# Patient Record
Sex: FEMALE | Race: WHITE | Employment: OTHER | URBAN - METROPOLITAN AREA
[De-identification: names, ages, dates, MRNs, and addresses within clinical notes are randomized per-mention and may not be internally consistent; named-entity substitution may affect disease eponyms.]

---

## 2019-01-16 PROBLEM — S02.85XA FRACTURE, ORBIT, CLOSED, INITIAL ENCOUNTER (HCC): Status: ACTIVE | Noted: 2019-01-16

## 2019-01-16 PROBLEM — S02.2XXA CLOSED FRACTURE OF NASAL BONES: Status: ACTIVE | Noted: 2019-01-16

## 2019-02-27 PROBLEM — S01.81XA FACIAL LACERATION: Status: ACTIVE | Noted: 2019-02-27

## 2023-03-29 ENCOUNTER — APPOINTMENT (OUTPATIENT)
Dept: GENERAL RADIOLOGY | Age: 58
End: 2023-03-29
Payer: COMMERCIAL

## 2023-03-29 ENCOUNTER — HOSPITAL ENCOUNTER (INPATIENT)
Age: 58
LOS: 3 days | Discharge: HOME HEALTH | End: 2023-04-01
Attending: EMERGENCY MEDICINE | Admitting: INTERNAL MEDICINE
Payer: COMMERCIAL

## 2023-03-29 DIAGNOSIS — L03.818 CELLULITIS OF OTHER SPECIFIED SITE: ICD-10-CM

## 2023-03-29 DIAGNOSIS — L03.319 CELLULITIS OF TRUNK, UNSPECIFIED SITE OF TRUNK: ICD-10-CM

## 2023-03-29 DIAGNOSIS — L30.4 INTERTRIGO: Primary | ICD-10-CM

## 2023-03-29 DIAGNOSIS — A41.9 SEPTICEMIA (HCC): ICD-10-CM

## 2023-03-29 DIAGNOSIS — E87.6 HYPOKALEMIA: ICD-10-CM

## 2023-03-29 PROBLEM — D50.9 IRON DEFICIENCY ANEMIA: Status: ACTIVE | Noted: 2023-03-29

## 2023-03-29 PROBLEM — J45.909 ASTHMA: Status: ACTIVE | Noted: 2023-03-29

## 2023-03-29 PROBLEM — K21.9 GERD (GASTROESOPHAGEAL REFLUX DISEASE): Status: ACTIVE | Noted: 2023-03-29

## 2023-03-29 PROBLEM — L03.90 CELLULITIS: Status: ACTIVE | Noted: 2023-03-29

## 2023-03-29 LAB
ABSOLUTE EOS #: 0.51 K/UL (ref 0–0.44)
ABSOLUTE IMMATURE GRANULOCYTE: 0.03 K/UL (ref 0–0.3)
ABSOLUTE LYMPH #: 1.65 K/UL (ref 1.1–3.7)
ABSOLUTE MONO #: 1.33 K/UL (ref 0.1–1.2)
ALBUMIN SERPL-MCNC: 3.7 G/DL (ref 3.5–5.2)
ALP SERPL-CCNC: 95 U/L (ref 35–104)
ALT SERPL-CCNC: 13 U/L (ref 5–33)
ANION GAP SERPL CALCULATED.3IONS-SCNC: 16 MMOL/L (ref 9–17)
AST SERPL-CCNC: 16 U/L
BASOPHILS # BLD: 1 % (ref 0–2)
BASOPHILS ABSOLUTE: 0.06 K/UL (ref 0–0.2)
BILIRUB DIRECT SERPL-MCNC: 0.1 MG/DL
BILIRUB INDIRECT SERPL-MCNC: 0.4 MG/DL (ref 0–1)
BILIRUB SERPL-MCNC: 0.5 MG/DL (ref 0.3–1.2)
BUN SERPL-MCNC: 8 MG/DL (ref 6–20)
BUN/CREAT BLD: 10 (ref 9–20)
CALCIUM SERPL-MCNC: 9.4 MG/DL (ref 8.6–10.4)
CHLORIDE SERPL-SCNC: 95 MMOL/L (ref 98–107)
CO2 SERPL-SCNC: 26 MMOL/L (ref 20–31)
CREAT SERPL-MCNC: 0.81 MG/DL (ref 0.5–0.9)
EOSINOPHILS RELATIVE PERCENT: 5 % (ref 1–4)
GFR SERPL CREATININE-BSD FRML MDRD: >60 ML/MIN/1.73M2
GLUCOSE SERPL-MCNC: 124 MG/DL (ref 70–99)
HCT VFR BLD AUTO: 45.3 % (ref 36.3–47.1)
HGB BLD-MCNC: 14.7 G/DL (ref 11.9–15.1)
IMMATURE GRANULOCYTES: 0 %
LACTIC ACID, SEPSIS: 1.1 MMOL/L (ref 0.5–1.9)
LACTIC ACID, SEPSIS: 1.4 MMOL/L (ref 0.5–1.9)
LIPASE SERPL-CCNC: 12 U/L (ref 13–60)
LYMPHOCYTES # BLD: 15 % (ref 24–43)
MAGNESIUM SERPL-MCNC: 1.7 MG/DL (ref 1.6–2.6)
MCH RBC QN AUTO: 29.1 PG (ref 25.2–33.5)
MCHC RBC AUTO-ENTMCNC: 32.5 G/DL (ref 28.4–34.8)
MCV RBC AUTO: 89.7 FL (ref 82.6–102.9)
MONOCYTES # BLD: 12 % (ref 3–12)
NRBC AUTOMATED: 0 PER 100 WBC
PDW BLD-RTO: 13.2 % (ref 11.8–14.4)
PLATELET # BLD AUTO: 314 K/UL (ref 138–453)
PMV BLD AUTO: 11.5 FL (ref 8.1–13.5)
POTASSIUM SERPL-SCNC: 2.5 MMOL/L (ref 3.7–5.3)
POTASSIUM SERPL-SCNC: 3.1 MMOL/L (ref 3.7–5.3)
PROT SERPL-MCNC: 6.8 G/DL (ref 6.4–8.3)
RBC # BLD: 5.05 M/UL (ref 3.95–5.11)
SEG NEUTROPHILS: 67 % (ref 36–65)
SEGMENTED NEUTROPHILS ABSOLUTE COUNT: 7.51 K/UL (ref 1.5–8.1)
SODIUM SERPL-SCNC: 137 MMOL/L (ref 135–144)
WBC # BLD AUTO: 11.1 K/UL (ref 3.5–11.3)

## 2023-03-29 PROCEDURE — 83690 ASSAY OF LIPASE: CPT

## 2023-03-29 PROCEDURE — 6360000002 HC RX W HCPCS: Performed by: NURSE PRACTITIONER

## 2023-03-29 PROCEDURE — 83735 ASSAY OF MAGNESIUM: CPT

## 2023-03-29 PROCEDURE — 36415 COLL VENOUS BLD VENIPUNCTURE: CPT

## 2023-03-29 PROCEDURE — 6370000000 HC RX 637 (ALT 250 FOR IP): Performed by: EMERGENCY MEDICINE

## 2023-03-29 PROCEDURE — 96374 THER/PROPH/DIAG INJ IV PUSH: CPT

## 2023-03-29 PROCEDURE — 80076 HEPATIC FUNCTION PANEL: CPT

## 2023-03-29 PROCEDURE — 99223 1ST HOSP IP/OBS HIGH 75: CPT | Performed by: NURSE PRACTITIONER

## 2023-03-29 PROCEDURE — 84132 ASSAY OF SERUM POTASSIUM: CPT

## 2023-03-29 PROCEDURE — 80048 BASIC METABOLIC PNL TOTAL CA: CPT

## 2023-03-29 PROCEDURE — 85025 COMPLETE CBC W/AUTO DIFF WBC: CPT

## 2023-03-29 PROCEDURE — 99285 EMERGENCY DEPT VISIT HI MDM: CPT

## 2023-03-29 PROCEDURE — 2580000003 HC RX 258: Performed by: NURSE PRACTITIONER

## 2023-03-29 PROCEDURE — 87040 BLOOD CULTURE FOR BACTERIA: CPT

## 2023-03-29 PROCEDURE — 71045 X-RAY EXAM CHEST 1 VIEW: CPT

## 2023-03-29 PROCEDURE — 83605 ASSAY OF LACTIC ACID: CPT

## 2023-03-29 PROCEDURE — 1200000000 HC SEMI PRIVATE

## 2023-03-29 PROCEDURE — 2580000003 HC RX 258: Performed by: EMERGENCY MEDICINE

## 2023-03-29 PROCEDURE — 6360000002 HC RX W HCPCS: Performed by: EMERGENCY MEDICINE

## 2023-03-29 PROCEDURE — 6370000000 HC RX 637 (ALT 250 FOR IP): Performed by: NURSE PRACTITIONER

## 2023-03-29 RX ORDER — PROPRANOLOL HYDROCHLORIDE 10 MG/1
10 TABLET ORAL DAILY
Status: DISCONTINUED | OUTPATIENT
Start: 2023-03-29 | End: 2023-03-29

## 2023-03-29 RX ORDER — SODIUM CHLORIDE 9 MG/ML
INJECTION, SOLUTION INTRAVENOUS PRN
Status: DISCONTINUED | OUTPATIENT
Start: 2023-03-29 | End: 2023-03-29 | Stop reason: SDUPTHER

## 2023-03-29 RX ORDER — ONDANSETRON 2 MG/ML
4 INJECTION INTRAMUSCULAR; INTRAVENOUS EVERY 6 HOURS PRN
Status: DISCONTINUED | OUTPATIENT
Start: 2023-03-29 | End: 2023-04-01 | Stop reason: HOSPADM

## 2023-03-29 RX ORDER — FLUTICASONE FUROATE AND VILANTEROL 200; 25 UG/1; UG/1
1 POWDER RESPIRATORY (INHALATION) DAILY
COMMUNITY

## 2023-03-29 RX ORDER — BUPROPION HYDROCHLORIDE 100 MG/1
100 TABLET, EXTENDED RELEASE ORAL 2 TIMES DAILY
Status: DISCONTINUED | OUTPATIENT
Start: 2023-03-29 | End: 2023-04-01 | Stop reason: HOSPADM

## 2023-03-29 RX ORDER — ENOXAPARIN SODIUM 100 MG/ML
40 INJECTION SUBCUTANEOUS DAILY
Status: DISCONTINUED | OUTPATIENT
Start: 2023-03-30 | End: 2023-04-01 | Stop reason: HOSPADM

## 2023-03-29 RX ORDER — SODIUM CHLORIDE 0.9 % (FLUSH) 0.9 %
5-40 SYRINGE (ML) INJECTION EVERY 12 HOURS SCHEDULED
Status: DISCONTINUED | OUTPATIENT
Start: 2023-03-29 | End: 2023-03-29 | Stop reason: SDUPTHER

## 2023-03-29 RX ORDER — FERROUS SULFATE 325(65) MG
325 TABLET ORAL
COMMUNITY

## 2023-03-29 RX ORDER — PANTOPRAZOLE SODIUM 40 MG/1
40 TABLET, DELAYED RELEASE ORAL
Status: DISCONTINUED | OUTPATIENT
Start: 2023-03-30 | End: 2023-04-01 | Stop reason: HOSPADM

## 2023-03-29 RX ORDER — POTASSIUM CHLORIDE 20 MEQ/1
40 TABLET, EXTENDED RELEASE ORAL ONCE
Status: DISCONTINUED | OUTPATIENT
Start: 2023-03-29 | End: 2023-03-29

## 2023-03-29 RX ORDER — SODIUM CHLORIDE 0.9 % (FLUSH) 0.9 %
5-40 SYRINGE (ML) INJECTION EVERY 12 HOURS SCHEDULED
Status: DISCONTINUED | OUTPATIENT
Start: 2023-03-29 | End: 2023-04-01 | Stop reason: HOSPADM

## 2023-03-29 RX ORDER — POTASSIUM CHLORIDE 7.45 MG/ML
10 INJECTION INTRAVENOUS ONCE
Status: DISCONTINUED | OUTPATIENT
Start: 2023-03-29 | End: 2023-04-01 | Stop reason: HOSPADM

## 2023-03-29 RX ORDER — POTASSIUM CHLORIDE 7.45 MG/ML
10 INJECTION INTRAVENOUS PRN
Status: DISCONTINUED | OUTPATIENT
Start: 2023-03-29 | End: 2023-04-01 | Stop reason: HOSPADM

## 2023-03-29 RX ORDER — POTASSIUM CHLORIDE 20 MEQ/1
40 TABLET, EXTENDED RELEASE ORAL PRN
Status: DISCONTINUED | OUTPATIENT
Start: 2023-03-29 | End: 2023-04-01 | Stop reason: HOSPADM

## 2023-03-29 RX ORDER — MAGNESIUM SULFATE 1 G/100ML
1000 INJECTION INTRAVENOUS PRN
Status: DISCONTINUED | OUTPATIENT
Start: 2023-03-29 | End: 2023-04-01 | Stop reason: HOSPADM

## 2023-03-29 RX ORDER — BUDESONIDE AND FORMOTEROL FUMARATE DIHYDRATE 160; 4.5 UG/1; UG/1
2 AEROSOL RESPIRATORY (INHALATION) 2 TIMES DAILY
Status: DISCONTINUED | OUTPATIENT
Start: 2023-03-29 | End: 2023-03-30

## 2023-03-29 RX ORDER — SODIUM CHLORIDE 9 MG/ML
INJECTION, SOLUTION INTRAVENOUS CONTINUOUS
Status: DISCONTINUED | OUTPATIENT
Start: 2023-03-29 | End: 2023-04-01

## 2023-03-29 RX ORDER — LANOLIN ALCOHOL/MO/W.PET/CERES
325 CREAM (GRAM) TOPICAL
Status: DISCONTINUED | OUTPATIENT
Start: 2023-03-30 | End: 2023-04-01 | Stop reason: HOSPADM

## 2023-03-29 RX ORDER — ACETAMINOPHEN 650 MG/1
650 SUPPOSITORY RECTAL EVERY 6 HOURS PRN
Status: DISCONTINUED | OUTPATIENT
Start: 2023-03-29 | End: 2023-04-01 | Stop reason: HOSPADM

## 2023-03-29 RX ORDER — ACETAMINOPHEN 325 MG/1
650 TABLET ORAL EVERY 6 HOURS PRN
Status: DISCONTINUED | OUTPATIENT
Start: 2023-03-29 | End: 2023-04-01 | Stop reason: HOSPADM

## 2023-03-29 RX ORDER — CHOLECALCIFEROL (VITAMIN D3) 1250 MCG
1 CAPSULE ORAL WEEKLY
COMMUNITY

## 2023-03-29 RX ORDER — TRAMADOL HYDROCHLORIDE 50 MG/1
50 TABLET ORAL EVERY 6 HOURS PRN
Status: DISCONTINUED | OUTPATIENT
Start: 2023-03-29 | End: 2023-04-01 | Stop reason: HOSPADM

## 2023-03-29 RX ORDER — SODIUM CHLORIDE 9 MG/ML
INJECTION, SOLUTION INTRAVENOUS PRN
Status: DISCONTINUED | OUTPATIENT
Start: 2023-03-29 | End: 2023-04-01 | Stop reason: HOSPADM

## 2023-03-29 RX ORDER — ONDANSETRON 4 MG/1
4 TABLET, ORALLY DISINTEGRATING ORAL EVERY 8 HOURS PRN
Status: DISCONTINUED | OUTPATIENT
Start: 2023-03-29 | End: 2023-04-01 | Stop reason: HOSPADM

## 2023-03-29 RX ORDER — PROPRANOLOL HYDROCHLORIDE 10 MG/1
10 TABLET ORAL 2 TIMES DAILY
Status: DISCONTINUED | OUTPATIENT
Start: 2023-03-29 | End: 2023-04-01 | Stop reason: HOSPADM

## 2023-03-29 RX ORDER — TRAMADOL HYDROCHLORIDE 50 MG/1
100 TABLET ORAL EVERY 6 HOURS PRN
Status: DISCONTINUED | OUTPATIENT
Start: 2023-03-29 | End: 2023-04-01 | Stop reason: HOSPADM

## 2023-03-29 RX ORDER — SODIUM CHLORIDE 0.9 % (FLUSH) 0.9 %
10 SYRINGE (ML) INJECTION PRN
Status: DISCONTINUED | OUTPATIENT
Start: 2023-03-29 | End: 2023-04-01 | Stop reason: HOSPADM

## 2023-03-29 RX ORDER — FLUCONAZOLE 2 MG/ML
100 INJECTION, SOLUTION INTRAVENOUS EVERY 24 HOURS
Status: DISCONTINUED | OUTPATIENT
Start: 2023-03-29 | End: 2023-04-01

## 2023-03-29 RX ORDER — POLYETHYLENE GLYCOL 3350 17 G/17G
17 POWDER, FOR SOLUTION ORAL DAILY PRN
Status: DISCONTINUED | OUTPATIENT
Start: 2023-03-29 | End: 2023-04-01 | Stop reason: HOSPADM

## 2023-03-29 RX ORDER — 0.9 % SODIUM CHLORIDE 0.9 %
30 INTRAVENOUS SOLUTION INTRAVENOUS ONCE
Status: COMPLETED | OUTPATIENT
Start: 2023-03-29 | End: 2023-03-29

## 2023-03-29 RX ORDER — SODIUM CHLORIDE 0.9 % (FLUSH) 0.9 %
5-40 SYRINGE (ML) INJECTION PRN
Status: DISCONTINUED | OUTPATIENT
Start: 2023-03-29 | End: 2023-03-29 | Stop reason: SDUPTHER

## 2023-03-29 RX ORDER — CLONAZEPAM 0.5 MG/1
1 TABLET ORAL EVERY EVENING
Status: DISCONTINUED | OUTPATIENT
Start: 2023-03-29 | End: 2023-04-01 | Stop reason: HOSPADM

## 2023-03-29 RX ORDER — CETIRIZINE HYDROCHLORIDE 10 MG/1
10 TABLET ORAL DAILY
Status: DISCONTINUED | OUTPATIENT
Start: 2023-03-29 | End: 2023-04-01 | Stop reason: HOSPADM

## 2023-03-29 RX ADMIN — SODIUM CHLORIDE 1000 ML: 9 INJECTION, SOLUTION INTRAVENOUS at 10:20

## 2023-03-29 RX ADMIN — VORTIOXETINE 20 MG: 20 TABLET, FILM COATED ORAL at 18:17

## 2023-03-29 RX ADMIN — POTASSIUM BICARBONATE 40 MEQ: 782 TABLET, EFFERVESCENT ORAL at 17:30

## 2023-03-29 RX ADMIN — CLONAZEPAM 1 MG: 0.5 TABLET ORAL at 18:15

## 2023-03-29 RX ADMIN — PROPRANOLOL HYDROCHLORIDE 10 MG: 10 TABLET ORAL at 18:48

## 2023-03-29 RX ADMIN — TRAMADOL HYDROCHLORIDE 100 MG: 50 TABLET, COATED ORAL at 18:14

## 2023-03-29 RX ADMIN — SODIUM CHLORIDE: 9 INJECTION, SOLUTION INTRAVENOUS at 17:31

## 2023-03-29 RX ADMIN — FLUCONAZOLE 100 MG: 2 INJECTION, SOLUTION INTRAVENOUS at 20:00

## 2023-03-29 RX ADMIN — POTASSIUM BICARBONATE 40 MEQ: 782 TABLET, EFFERVESCENT ORAL at 12:41

## 2023-03-29 RX ADMIN — BUPROPION HYDROCHLORIDE 100 MG: 100 TABLET, EXTENDED RELEASE ORAL at 20:01

## 2023-03-29 RX ADMIN — VANCOMYCIN HYDROCHLORIDE 2000 MG: 1 INJECTION, POWDER, LYOPHILIZED, FOR SOLUTION INTRAVENOUS at 10:58

## 2023-03-29 ASSESSMENT — PAIN DESCRIPTION - LOCATION
LOCATION: BREAST
LOCATION: ABDOMEN;BREAST

## 2023-03-29 ASSESSMENT — PAIN SCALES - GENERAL
PAINLEVEL_OUTOF10: 5
PAINLEVEL_OUTOF10: 7
PAINLEVEL_OUTOF10: 9

## 2023-03-29 ASSESSMENT — PAIN DESCRIPTION - ORIENTATION: ORIENTATION: ANTERIOR;LEFT;RIGHT

## 2023-03-29 ASSESSMENT — ENCOUNTER SYMPTOMS
NAUSEA: 0
SORE THROAT: 0
RHINORRHEA: 0
COUGH: 0
SHORTNESS OF BREATH: 0
DIARRHEA: 0
EYE REDNESS: 0
VOMITING: 0
COLOR CHANGE: 0
EYE DISCHARGE: 0

## 2023-03-29 ASSESSMENT — PAIN - FUNCTIONAL ASSESSMENT
PAIN_FUNCTIONAL_ASSESSMENT: 0-10
PAIN_FUNCTIONAL_ASSESSMENT: PREVENTS OR INTERFERES SOME ACTIVE ACTIVITIES AND ADLS

## 2023-03-29 ASSESSMENT — PAIN DESCRIPTION - PAIN TYPE: TYPE: ACUTE PAIN

## 2023-03-29 ASSESSMENT — PAIN DESCRIPTION - DESCRIPTORS: DESCRIPTORS: BURNING

## 2023-03-29 ASSESSMENT — PAIN DESCRIPTION - FREQUENCY: FREQUENCY: CONTINUOUS

## 2023-03-29 NOTE — PROGRESS NOTES
Med list fax obtained. Med list updated according to list. Med rec pharmacist consulted for assistance d/t discrepancies.

## 2023-03-29 NOTE — H&P
[facility]  Office: [address], DO, Tiffany Bray, DO, Sindhu Haque, DO, Jahaira gary Blood, DO, Marisa Rodriguez MD, Gretchen Joseph MD, Gisselle Sen MD, Carlo Matta MD,  Ede Santizo MD, Adriana Andres MD, Manda Villanueva, DO, Adithya Carroll MD,  Linda Marie MD, Aaron Acuña MD, Humza Hollis DO, Benedict Reynolds MD, Layla Birmingham MD, Femi Simms DO, Ravindra Shea MD, Meri Tellez MD, Jenna Lopes MD, Colton Mcintyre MD,  Rustam Limon DO, Andree Presley MD,  Torie Saldana, CNP,  Cory Fox, CNP, Elizabet Hernandez, CNP, Tracey Bryan, CNP,  Elsi Harley, DNP, oTmmy Rachel, CNP, Jina Rob, CNP, Mejia Cedillo, CNP, Ivan Potter, CNP, Nathanael Barragan, CNP, Rain Meredith PA-C, Nicko Alvarez, CNS, Zhanna Mann, CNP, Judit Aden, CNP         [facility]    HISTORY AND PHYSICAL EXAMINATION            Date:   3/29/2023  Patient name:  Rudy Borges  Date of admission:  3/29/2023  8:48 AM  MRN:   [id number]  Account:  [de-identified]  YOB: 1965  PCP:    No primary care provider on file. Room:   [address] [place] [address]  Code Status:    Full Code    Chief Complaint:     Chief Complaint   Patient presents with    Cellulitis       History Obtained From:     patient, electronic medical record    History of Present Illness:     Rudy Borges is a 62 y.o. Non- / non  female history of schizoaffective bipolar disorder, asthma and GERD who presents with Cellulitis and is admitted to the hospital for the management of Cellulitis. Patient states that she has had some bilateral breast irritation and a rash for the last few weeks. She states she is generally not been feeling well and complains of ongoing weakness and low-grade fevers. She states it is quite painful under both of her breasts and she has not been able to sleep because of the pain.   Patient lives with her father. No leukocytosis on arrival, potassium was 2.5 and she did receive 40 mEq of oral potassium in ED. patient was mildly tachycardic, and did have some increased respirations however she is in no acute distress. Lactic acid 1.1, 1.4. He is afebrile here. Patient was given IV vancomycin for concerns of cellulitis in ED  Past Medical History:     Past Medical History:   Diagnosis Date    Asthma     GERD (gastroesophageal reflux disease)     Iron deficiency anemia     Schizoaffective disorder, bipolar type ([facility] [address]         Past Surgical History:     Past Surgical History:   Procedure Laterality Date    [place] HEMIARTHROPLASTY HIP PARTIAL      Hip Replacement, Partial, left        Medications Prior to Admission:     Prior to Admission medications    Medication Sig Start Date End Date Taking?  Authorizing Provider   fluticasone furoate-vilanterol (BREO ELLIPTA) 200-25 MCG/ACT AEPB inhaler Inhale 1 puff into the lungs daily   Yes Historical Provider, MD   ferrous sulfate (IRON 325) 325 (65 Fe) MG tablet Take 325 mg by mouth daily (with breakfast)   Yes Historical Provider, MD   Cholecalciferol (VITAMIN D3) 1.25 MG (23630 UT) CAPS Take 1 each by mouth once a week Indications: Fridays   Yes Historical Provider, MD   cariprazine hcl (VRAYLAR) 1.5 MG capsule Take 1.5 mg by mouth daily   Yes Historical Provider, MD   VORTIoxetine HBr (TRINTELLIX) 20 MG TABS tablet Take 20 mg by mouth daily   Yes Historical Provider, MD   fexofenadine (ALLEGRA) 180 MG tablet Take 1 tablet by mouth daily as needed 2/6/19   Historical Provider, MD   omeprazole (PRILOSEC) 40 MG delayed release capsule Take 40 mg by mouth daily 8/23/18   Historical Provider, MD   propranolol (INDERAL) 10 MG tablet Take 10 mg by mouth 2 times daily    Historical Provider, MD   buPROPion (WELLBUTRIN SR) 100 MG extended release tablet Take 1 tablet by mouth 2 times daily 10/5/16   Historical Provider, MD   clonazePAM (KLONOPIN) 1 MG tablet Take 1 tablet by mouth

## 2023-03-29 NOTE — CONSULTS
[address] [facility] Pharmacokinetic Monitoring Service - Vancomycin     Kehinde Lan is a 62 y.o. female starting on vancomycin therapy for SSTI. Pharmacy consulted by Baron Cai for monitoring and adjustment. Target Concentration: Goal trough of 10-15 mg/L and AUC/TYLER <500 mg*hr/L    Additional Antimicrobials: None    Pertinent Laboratory Values: Wt Readings from Last 1 Encounters:   03/29/23 181 lb (82.1 kg)     Temp Readings from Last 1 Encounters:   03/29/23 98.1 °F (36.7 °C) (Oral)     Estimated Creatinine Clearance: 78 mL/min (based on SCr of 0.81 mg/dL). Recent Labs     03/29/23  0900   CREATININE 0.81   WBC 11.1     Procalcitonin: None    MRSA Nasal Swab: N/A. Non-respiratory infection.     Plan:  Dosing recommendations based on Bayesian software  Start vancomycin 2000mg bolus dose followed by 1250mg q18h  Anticipated AUC of 414 and trough concentration of 11.8 at steady state  Renal labs as indicated   Vancomycin concentration ordered for 3/30 @ 1200   Pharmacy will continue to monitor patient and adjust therapy as indicated    Thank you for the consult,  Georgia Nuñez [facility]  3/29/2023 5:08 PM

## 2023-03-29 NOTE — PROGRESS NOTES
Transitions of Care Pharmacy Service   Medication Review    The patient's list of current home medications has been reviewed. Her oral medications are bubblepacked by [employer]. Source(s) of information: patient, [employer], [employer] refill report      Please feel free to call me with any questions about this encounter. Thank you. [facility]   Transitions of Care Pharmacy Service  Phone:  [phone]  Fax: [phone]      Electronically signed by Luisito [facility] on 3/29/2023 at 6:46 PM           Medications Prior to Admission:   fluticasone furoate-vilanterol (BREO ELLIPTA) 200-25 MCG/ACT AEPB inhaler, Inhale 1 puff into the lungs daily  ferrous sulfate (IRON 325) 325 (65 Fe) MG tablet, Take 325 mg by mouth daily (with breakfast)  Cholecalciferol (VITAMIN D3) 1.25 MG (82230 UT) CAPS, Take 1 each by mouth once a week Indications: Fridays  cariprazine hcl (VRAYLAR) 1.5 MG capsule, Take 1.5 mg by mouth daily  VORTIoxetine HBr (TRINTELLIX) 20 MG TABS tablet, Take 20 mg by mouth daily  fexofenadine (ALLEGRA) 180 MG tablet, Take 1 tablet by mouth daily as needed  omeprazole (PRILOSEC) 40 MG delayed release capsule, Take 40 mg by mouth daily  propranolol (INDERAL) 10 MG tablet, Take 10 mg by mouth 2 times daily  buPROPion (WELLBUTRIN SR) 100 MG extended release tablet, Take 1 tablet by mouth 2 times daily  clonazePAM (KLONOPIN) 1 MG tablet, Take 1 tablet by mouth every evening.

## 2023-03-29 NOTE — ED PROVIDER NOTES
EMERGENCY DEPARTMENT ENCOUNTER    Pt Name: Jd Keys  MRN: [id number]  [place] 1965  Date of evaluation: 3/29/23  CHIEF COMPLAINT       Chief Complaint   Patient presents with    Cellulitis     HISTORY OF PRESENT ILLNESS   This is a 80-year-old female who presents with complaints of bilateral breast irritation, weakness and just generally not feeling well. The patient states that her symptoms been ongoing for the past few weeks with much worsening symptoms over the past few days. She is to the point where she not able to walk around because the pain is so severe, she had a low-grade fever and was brought in for further evaluation. The patient has gotten to the point where she is so weak she is not able to get up and walk around on her own. REVIEW OF SYSTEMS     Review of Systems   Constitutional:  Positive for fatigue. Negative for chills and fever. HENT:  Negative for rhinorrhea and sore throat. Eyes:  Negative for discharge, redness and visual disturbance. Respiratory:  Negative for cough and shortness of breath. Cardiovascular:  Negative for chest pain, palpitations and leg swelling. Gastrointestinal:  Negative for diarrhea, nausea and vomiting. Genitourinary:  Negative for dysuria and hematuria. Musculoskeletal:  Negative for arthralgias, myalgias and neck pain. Skin:  Positive for rash. Negative for color change. Neurological:  Positive for weakness. Negative for seizures and headaches. Psychiatric/Behavioral:  Negative for hallucinations, self-injury and suicidal ideas. PASTMEDICAL HISTORY   History reviewed. No pertinent past medical history. Past Problem List  Patient Active Problem List   Diagnosis Code    Schizoaffective disorder, bipolar type ([facility] F25.0    Closed fracture of nasal bones S02. 2XXA    Fracture, orbit, closed, initial encounter ([facility] [address] S02.85XA    Facial laceration S01.81XA     SURGICAL HISTORY       Past Surgical History:   Procedure Laterality Date    [place] HEMIARTHROPLASTY HIP PARTIAL      Hip Replacement, Partial, left     CURRENT MEDICATIONS       Previous Medications    BENZTROPINE (COGENTIN) 0.5 MG TABLET    Take 1 tablet by mouth 2 times daily    BUPROPION (WELLBUTRIN SR) 100 MG EXTENDED RELEASE TABLET    Take 1 tablet by mouth daily    BUSPIRONE (BUSPAR) 10 MG TABLET    Take 1 tablet by mouth 3 times daily    CARIPRAZINE HCL 3 MG CAPS    Take 3 mg by mouth daily    CLONAZEPAM (KLONOPIN) 1 MG TABLET    Take 1 tablet by mouth daily. Justa Estrada ESCITALOPRAM (LEXAPRO) 5 MG TABLET    Take 5 mg by mouth daily    FEXOFENADINE (ALLEGRA) 180 MG TABLET    Take 1 tablet by mouth daily    FLUTICASONE (FLONASE) 50 MCG/ACT NASAL SPRAY    2 sprays by Nasal route daily    HYDROXYZINE (ATARAX) 25 MG TABLET    Take 2 tablets by mouth daily    LORAZEPAM (ATIVAN) 1 MG TABLET    Take 1 tablet by mouth daily    OMEPRAZOLE (PRILOSEC) 20 MG DELAYED RELEASE CAPSULE    Take 20 mg by mouth daily    PROPRANOLOL (INDERAL) 10 MG TABLET    Take 10 mg by mouth daily    RANITIDINE (ZANTAC) 150 MG TABLET    Take 150 mg by mouth daily    TOPIRAMATE (TOPAMAX) 50 MG TABLET    Take 50 mg by mouth daily    TRAZODONE (DESYREL) 100 MG TABLET    Take 1 tablet by mouth nightly as needed for Sleep    VORTIOXETINE (BRINTELLIX) 10 MG TABS TABLET    Take 10 mg by mouth daily     ALLERGIES     is allergic to penicillins, animal chews, dust mite extract, fish-derived products, and molds & smuts. FAMILY HISTORY     has no family status information on file. SOCIAL HISTORY       Social History     Tobacco Use    Smoking status: Never    Smokeless tobacco: Never   Substance Use Topics    Alcohol use: Not Currently    Drug use: Never     PHYSICAL EXAM     INITIAL VITALS: /89   Pulse 93   Temp 97.9 °F (36.6 °C) (Oral)   Resp 22   Ht 5' 3\" (1.6 m)   Wt 181 lb (82.1 kg)   LMP 05/03/2016   SpO2 94%   BMI 32.06 kg/m²    Physical Exam  Constitutional:       Appearance: Normal appearance.  She is

## 2023-03-29 NOTE — PROGRESS NOTES
Pt admitted to room 2109 per wheelchair in good condition from ED  Oriented to room and surroundings  Bed in lowest position, wheels locked, 2/4 side rails up  Call light in reach, room free of clutter, adequate lighting provided  Denies any further questions at this time  Instructed to call out with any questions/concerns/new onset of pain and/or n/v   White board updated  Continue to monitor with hourly rounding  STAY WITH ME protocol initiated   Bed alarm on/Fall Risk signs in place/Fall risk sticker to wrist band  Non-skid socks on/at bedside

## 2023-03-29 NOTE — PROGRESS NOTES
Attempted to call patients father. Patient states father has her living will. Number rang busy. [phone]: attempted to call father again, still rang busy.

## 2023-03-30 PROBLEM — E87.6 HYPOKALEMIA: Status: ACTIVE | Noted: 2023-03-29

## 2023-03-30 LAB
ABSOLUTE EOS #: 0.59 K/UL (ref 0–0.44)
ABSOLUTE IMMATURE GRANULOCYTE: 0.03 K/UL (ref 0–0.3)
ABSOLUTE LYMPH #: 1.82 K/UL (ref 1.1–3.7)
ABSOLUTE MONO #: 1 K/UL (ref 0.1–1.2)
ANION GAP SERPL CALCULATED.3IONS-SCNC: 11 MMOL/L (ref 9–17)
BASOPHILS # BLD: 1 % (ref 0–2)
BASOPHILS ABSOLUTE: 0.04 K/UL (ref 0–0.2)
BUN SERPL-MCNC: 4 MG/DL (ref 6–20)
BUN/CREAT BLD: 6 (ref 9–20)
CALCIUM SERPL-MCNC: 8.6 MG/DL (ref 8.6–10.4)
CHLORIDE SERPL-SCNC: 103 MMOL/L (ref 98–107)
CO2 SERPL-SCNC: 25 MMOL/L (ref 20–31)
CREAT SERPL-MCNC: 0.66 MG/DL (ref 0.5–0.9)
EOSINOPHILS RELATIVE PERCENT: 7 % (ref 1–4)
GFR SERPL CREATININE-BSD FRML MDRD: >60 ML/MIN/1.73M2
GLUCOSE SERPL-MCNC: 95 MG/DL (ref 70–99)
HCT VFR BLD AUTO: 40 % (ref 36.3–47.1)
HGB BLD-MCNC: 13 G/DL (ref 11.9–15.1)
IMMATURE GRANULOCYTES: 0 %
LYMPHOCYTES # BLD: 21 % (ref 24–43)
MAGNESIUM SERPL-MCNC: 1.6 MG/DL (ref 1.6–2.6)
MCH RBC QN AUTO: 29.4 PG (ref 25.2–33.5)
MCHC RBC AUTO-ENTMCNC: 32.5 G/DL (ref 28.4–34.8)
MCV RBC AUTO: 90.5 FL (ref 82.6–102.9)
MONOCYTES # BLD: 12 % (ref 3–12)
NRBC AUTOMATED: 0 PER 100 WBC
PDW BLD-RTO: 13.2 % (ref 11.8–14.4)
PLATELET # BLD AUTO: 269 K/UL (ref 138–453)
PMV BLD AUTO: 10.4 FL (ref 8.1–13.5)
POTASSIUM SERPL-SCNC: 2.9 MMOL/L (ref 3.7–5.3)
POTASSIUM SERPL-SCNC: 3.6 MMOL/L (ref 3.7–5.3)
RBC # BLD: 4.42 M/UL (ref 3.95–5.11)
SEG NEUTROPHILS: 59 % (ref 36–65)
SEGMENTED NEUTROPHILS ABSOLUTE COUNT: 5.01 K/UL (ref 1.5–8.1)
SODIUM SERPL-SCNC: 139 MMOL/L (ref 135–144)
VANCOMYCIN RANDOM: 16.7 UG/ML
WBC # BLD AUTO: 8.5 K/UL (ref 3.5–11.3)

## 2023-03-30 PROCEDURE — 85025 COMPLETE CBC W/AUTO DIFF WBC: CPT

## 2023-03-30 PROCEDURE — 83735 ASSAY OF MAGNESIUM: CPT

## 2023-03-30 PROCEDURE — 6360000002 HC RX W HCPCS: Performed by: NURSE PRACTITIONER

## 2023-03-30 PROCEDURE — 84132 ASSAY OF SERUM POTASSIUM: CPT

## 2023-03-30 PROCEDURE — 2580000003 HC RX 258: Performed by: NURSE PRACTITIONER

## 2023-03-30 PROCEDURE — 99232 SBSQ HOSP IP/OBS MODERATE 35: CPT | Performed by: NURSE PRACTITIONER

## 2023-03-30 PROCEDURE — 36415 COLL VENOUS BLD VENIPUNCTURE: CPT

## 2023-03-30 PROCEDURE — 1200000000 HC SEMI PRIVATE

## 2023-03-30 PROCEDURE — 80202 ASSAY OF VANCOMYCIN: CPT

## 2023-03-30 PROCEDURE — 80048 BASIC METABOLIC PNL TOTAL CA: CPT

## 2023-03-30 PROCEDURE — 6370000000 HC RX 637 (ALT 250 FOR IP): Performed by: NURSE PRACTITIONER

## 2023-03-30 RX ORDER — POTASSIUM CHLORIDE 20 MEQ/1
40 TABLET, EXTENDED RELEASE ORAL ONCE
Status: COMPLETED | OUTPATIENT
Start: 2023-03-30 | End: 2023-03-30

## 2023-03-30 RX ADMIN — ENOXAPARIN SODIUM 40 MG: 100 INJECTION SUBCUTANEOUS at 09:48

## 2023-03-30 RX ADMIN — SODIUM CHLORIDE: 9 INJECTION, SOLUTION INTRAVENOUS at 20:58

## 2023-03-30 RX ADMIN — CARIPRAZINE 1.5 MG: 1.5 CAPSULE, GELATIN COATED ORAL at 09:48

## 2023-03-30 RX ADMIN — CLONAZEPAM 1 MG: 0.5 TABLET ORAL at 18:40

## 2023-03-30 RX ADMIN — CETIRIZINE HYDROCHLORIDE 10 MG: 10 TABLET, FILM COATED ORAL at 09:48

## 2023-03-30 RX ADMIN — FERROUS SULFATE TAB EC 325 MG (65 MG FE EQUIVALENT) 325 MG: 325 (65 FE) TABLET DELAYED RESPONSE at 09:53

## 2023-03-30 RX ADMIN — VORTIOXETINE 20 MG: 20 TABLET, FILM COATED ORAL at 09:48

## 2023-03-30 RX ADMIN — VANCOMYCIN HYDROCHLORIDE 1250 MG: 5 INJECTION, POWDER, LYOPHILIZED, FOR SOLUTION INTRAVENOUS at 05:18

## 2023-03-30 RX ADMIN — POTASSIUM CHLORIDE 10 MEQ: 7.46 INJECTION, SOLUTION INTRAVENOUS at 10:58

## 2023-03-30 RX ADMIN — POTASSIUM CHLORIDE 10 MEQ: 7.46 INJECTION, SOLUTION INTRAVENOUS at 09:59

## 2023-03-30 RX ADMIN — BUPROPION HYDROCHLORIDE 100 MG: 100 TABLET, EXTENDED RELEASE ORAL at 20:56

## 2023-03-30 RX ADMIN — BUPROPION HYDROCHLORIDE 100 MG: 100 TABLET, EXTENDED RELEASE ORAL at 09:48

## 2023-03-30 RX ADMIN — PROPRANOLOL HYDROCHLORIDE 10 MG: 10 TABLET ORAL at 09:48

## 2023-03-30 RX ADMIN — VANCOMYCIN HYDROCHLORIDE 1500 MG: 5 INJECTION, POWDER, LYOPHILIZED, FOR SOLUTION INTRAVENOUS at 23:27

## 2023-03-30 RX ADMIN — FLUCONAZOLE 100 MG: 2 INJECTION, SOLUTION INTRAVENOUS at 18:41

## 2023-03-30 RX ADMIN — POTASSIUM CHLORIDE 40 MEQ: 1500 TABLET, EXTENDED RELEASE ORAL at 12:52

## 2023-03-30 RX ADMIN — PROPRANOLOL HYDROCHLORIDE 10 MG: 10 TABLET ORAL at 20:57

## 2023-03-30 RX ADMIN — PANTOPRAZOLE SODIUM 40 MG: 40 TABLET, DELAYED RELEASE ORAL at 05:23

## 2023-03-30 ASSESSMENT — PAIN DESCRIPTION - ORIENTATION: ORIENTATION: LOWER

## 2023-03-30 ASSESSMENT — PAIN DESCRIPTION - LOCATION: LOCATION: BACK

## 2023-03-30 ASSESSMENT — PAIN SCALES - GENERAL: PAINLEVEL_OUTOF10: 3

## 2023-03-30 ASSESSMENT — PAIN DESCRIPTION - DESCRIPTORS: DESCRIPTORS: ACHING

## 2023-03-30 NOTE — PROGRESS NOTES
[facility]  Office: [address], DO, Erasmo Moraga, DO, Antonio Chávez, DO, Juan Larson Blood, DO, Moisés Reese MD, Livan Sanchez MD, Shaheen Alexander MD, Gladys Douglas MD,  Piedad Quijano MD, Huma Chappell MD, Gaby Vaughan, DO, Roman San MD,  Toribio Grijalva MD, Wily Brunner MD, Silvina Freitas, DO, Mono Padilla MD, Dedrick Camilo MD, Helene Snider, DO, Tj Abbasi MD, Reji Montoya MD, Gilman Essex, MD, Claudetta Rad, MD,  Howard Sandra, DO, Paulette Andrews MD,  Aly Ashford CNP,  Doc Meza CNP, Ghada Garcia CNP, Alicja Trevino, CNP,  Charmel Osgood, SHOBHA, Gely Weiss, MARGARET, Sharmila Murdock CNP, Dwaine Cameron CNP, Shaheen Orr CNP, Aleksander Medrano CNP, Valeri Mckeon PA-C, Kiana Fam, MILDRED, Omid Rao, MARGARET, Jammie Crum, Calderon [facility]    Progress Note    3/30/2023    9:56 AM     Name:   Abdelrahman Capone  MRN:     [id number]     Acct:      [de-identified]   Room:   2109/2109-01   Day:  1  Admit Date:  3/29/2023  8:48 AM    PCP:   No primary care provider on file. Code Status:  Full Code    Subjective:     C/C:   Chief Complaint   Patient presents with    Cellulitis     Interval History Status: improved. Patient is sitting up in chair, she states she is feeling a little better today compared to yesterday. He still gets mildly tachycardic occasionally however vital signs are stable. Breath. She does get easily tearful/anxious, support provided and questions answered    Brief History:   Abdelrahman Capone is a 62 y.o. female history of schizoaffective bipolar disorder, asthma and GERD who presents with Cellulitis and is admitted to the hospital for the management of Cellulitis. Patient states that she has had some bilateral breast irritation and a rash for the last few weeks.   She states she is generally not been feeling well and complains of ongoing weakness and chloride, potassium chloride **OR** potassium alternative oral replacement **OR** potassium chloride, magnesium sulfate, ondansetron **OR** ondansetron, polyethylene glycol, acetaminophen **OR** acetaminophen, traMADol **OR** traMADol    Data:     Past Medical History:   has a past medical history of Asthma, GERD (gastroesophageal reflux disease), Iron deficiency anemia, and Schizoaffective disorder, bipolar type ([address] Social History:   reports that she has never smoked. She has never used smokeless tobacco. She reports that she does not currently use alcohol. She reports that she does not use drugs. Family History:   Family History   Problem Relation Age of Onset    Heart Disease Mother     Cancer Father        Vitals:  BP (!) 157/91   Pulse 80   Temp 98.4 °F (36.9 °C) (Oral)   Resp 18   Ht 5' 3\" (1.6 m)   Wt 185 lb (83.9 kg)   LMP 2016   SpO2 95%   BMI 32.77 kg/m²   Temp (24hrs), Av.2 °F (36.8 °C), Min:97.9 °F (36.6 °C), Max:98.4 °F (36.9 °C)    No results for input(s): POCGLU in the last 72 hours. I/O (24Hr):     Intake/Output Summary (Last 24 hours) at 3/30/2023 1009  Last data filed at 3/30/2023 0552  Gross per 24 hour   Intake --   Output 1500 ml   Net -1500 ml       Labs:  Hematology:  Recent Labs     23  0900 23  0654   WBC 11.1 8.5   RBC 5.05 4.42   HGB 14.7 13.0   HCT 45.3 40.0   MCV 89.7 90.5   MCH 29.1 29.4   MCHC 32.5 32.5   RDW 13.2 13.2    269   MPV 11.5 10.4     Chemistry:  Recent Labs     23  0900 23  1654 23  0654     --  139   K 2.5* 3.1* 2.9*   CL 95*  --  103   CO2 26  --  25   GLUCOSE 124*  --  95   BUN 8  --  4*   CREATININE 0.81  --  0.66   MG 1.7  --  1.6   ANIONGAP 16  --  11   LABGLOM >60  --  >60   CALCIUM 9.4  --  8.6     Recent Labs     23  0900   PROT 6.8   LABALBU 3.7   AST 16   ALT 13   ALKPHOS 95   BILITOT 0.5   BILIDIR 0.1   LIPASE 12*     ABG:No results found for: POCPH, PHART, PH, POCPCO2, SYK4IVB, PCO2,

## 2023-03-30 NOTE — PROGRESS NOTES
Weight Change (Calculated): -5.1                    BMI Categories: Obese Class 1 (BMI 30.0-34. 9)    Estimated Daily Nutrient Needs:  Energy Requirements Based On: Kcal/kg  Weight Used for Energy Requirements: Current  Energy (kcal/day): 1744-8477 kcal (15-18 kcal/kg)  Weight Used for Protein Requirements: Ideal  Protein (g/day): 62-68 gm of protein (1.2-1.3 gm/kg)       Nutrition Diagnosis:   Moderate malnutrition related to inadequate protein-energy intake as evidenced by weight loss, intake 26-50%    Nutrition Interventions:   Food and/or Nutrient Delivery: Continue Current Diet, Modify Oral Nutrition Supplement  Nutrition Education/Counseling: Education not indicated  Coordination of Nutrition Care: Continue to monitor while inpatient       Goals:     Goals: PO intake 75% or greater       Nutrition Monitoring and Evaluation:      Food/Nutrient Intake Outcomes: Food and Nutrient Intake, Supplement Intake  Physical Signs/Symptoms Outcomes: Skin, Fluid Status or Edema, Biochemical Data    Discharge Planning:    Continue current diet         Osorio RODRIGUEZN, RDN, LDN  Lead Clinical Dietitian  RD Office Phone [phone]

## 2023-03-30 NOTE — PROGRESS NOTES
[address] [facility] Pharmacokinetic Monitoring Service - Vancomycin    Consulting Provider: KAITLIN Glaser CNP   Indication: SSTI  Target Concentration: Goal trough of 10-15 mg/L and AUC/TYLER <500 mg*hr/L  Day of Therapy: 2  Additional Antimicrobials: fluconazole     Pertinent Laboratory Values: Wt Readings from Last 1 Encounters:   03/30/23 185 lb (83.9 kg)     Temp Readings from Last 1 Encounters:   03/30/23 98.4 °F (36.9 °C) (Oral)     Estimated Creatinine Clearance: 97 mL/min (based on SCr of 0.66 mg/dL). Recent Labs     03/29/23  0900 03/30/23  0654   CREATININE 0.81 0.66   WBC 11.1 8.5     Procalcitonin: none     Pertinent Cultures:  Culture Date Source Results   3/29/23 blood No growth    MRSA Nasal Swab: N/A. Non-respiratory infection. Recent vancomycin administrations                     vancomycin (VANCOCIN) 1,250 mg in sodium chloride 0.9 % 250 mL IVPB (mg) 1,250 mg New Bag 03/30/23 0518    vancomycin (VANCOCIN) 2,000 mg in sodium chloride 0.9 % 500 mL IVPB (mg) 2,000 mg New Bag 03/29/23 1058                    Assessment:  Date/Time Current Dose Concentration Timing of Concentration (h) AUC   3/30/23 1250mg q18h  16.7 9h24m 402   Note: Serum concentrations collected for AUC dosing may appear elevated if collected in close proximity to the dose administered, this is not necessarily an indication of toxicity    Plan:  Current dosing regimen is therapeutic.  But low end of AUC  Increase dose to vancomycin 1500mg ivpb q18h   to achieve AUC = 480 and trough = 13.2    Pharmacy will continue to monitor patient and adjust therapy as indicated    Thank you for the consult,  Dc Gallardo, [address] [facility]  3/30/2023 4:27 PM

## 2023-03-30 NOTE — CARE COORDINATION
Case Management Initial Discharge Plan  Josephine Rincon,             Met with:patient to discuss discharge plans. Information verified: address, contacts, phone number, , insurance Yes  PCP: NORM Barba CNP  Date of last visit: 3-    Insurance Provider: Mekhi Vallejo Dual    Discharge Planning    Living Arrangements:  Parent   Support Systems:  Parent    Home has 2 stories  2 stairs to climb to get into front door, 15 stairs to climb to reach second floor  Location of bedroom/bathroom in home  - second floor (has been sleeping on couch)    Patient able to perform ADL's:Independent    Current Services (outpatient & in home) none  DME equipment: none  DME provider: N/A    Pharmacy: [facility]    Potential Assistance Needed:  Home Care    Patient agreeable to home care: TBD  [place] of choice provided:  no    Prior SNF/Rehab Placement and Facility: No  Agreeable to SNF/Rehab: No  [place] of choice provided: n/a   Evaluation: n/a    Expected Discharge date:     Patient expects to be discharged to:   home  Follow Up Appointment: Best Day/ Time:      Transportation provider: father  Transportation arrangements needed for discharge: No    Readmission Risk              Risk of Unplanned Readmission:  13             Does patient have a readmission risk score greater than 14?: No  If yes, follow-up appointment must be made within 7 days of discharge. Goal of Care:       Discharge Plan: Met with patient at bedside. Lives with father and states is independent and drives. Admitted for inflammation under both breasts that has progressively been worsening. Currently on IV Vanco and Diflucan. Has history of schizoaffective disorder bipolar type. Pt becomes tearful at times during conversation. Stated her mom passed away 2 years ago and now lives with dad. Continue to follow for home care needs once plan of care is determined.                   Electronically signed by Bessy Cheng Yeimy Dugan on 3/30/23 at 3:19 PM EDT

## 2023-03-30 NOTE — PLAN OF CARE
Problem: Discharge Planning  Goal: Discharge to home or other facility with appropriate resources  Outcome: Progressing  Note: Patient will go home following this admit. Problem: Pain  Goal: Verbalizes/displays adequate comfort level or baseline comfort level  Outcome: Progressing  Note: Patient has as needed pain control and will ask for it.

## 2023-03-31 PROBLEM — L30.4 INTERTRIGO: Status: ACTIVE | Noted: 2023-03-31

## 2023-03-31 LAB
ABSOLUTE EOS #: 0.4 K/UL (ref 0–0.44)
ABSOLUTE IMMATURE GRANULOCYTE: 0.02 K/UL (ref 0–0.3)
ABSOLUTE LYMPH #: 1.49 K/UL (ref 1.1–3.7)
ABSOLUTE MONO #: 0.8 K/UL (ref 0.1–1.2)
ANION GAP SERPL CALCULATED.3IONS-SCNC: 8 MMOL/L (ref 9–17)
BASOPHILS # BLD: 1 % (ref 0–2)
BASOPHILS ABSOLUTE: 0.04 K/UL (ref 0–0.2)
BUN SERPL-MCNC: 7 MG/DL (ref 6–20)
BUN/CREAT BLD: 9 (ref 9–20)
CALCIUM SERPL-MCNC: 8.7 MG/DL (ref 8.6–10.4)
CHLORIDE SERPL-SCNC: 110 MMOL/L (ref 98–107)
CO2 SERPL-SCNC: 28 MMOL/L (ref 20–31)
CREAT SERPL-MCNC: 0.74 MG/DL (ref 0.5–0.9)
EOSINOPHILS RELATIVE PERCENT: 6 % (ref 1–4)
GFR SERPL CREATININE-BSD FRML MDRD: >60 ML/MIN/1.73M2
GLUCOSE SERPL-MCNC: 107 MG/DL (ref 70–99)
HCT VFR BLD AUTO: 39.7 % (ref 36.3–47.1)
HGB BLD-MCNC: 12.4 G/DL (ref 11.9–15.1)
IMMATURE GRANULOCYTES: 0 %
LYMPHOCYTES # BLD: 23 % (ref 24–43)
MAGNESIUM SERPL-MCNC: 1.7 MG/DL (ref 1.6–2.6)
MCH RBC QN AUTO: 29 PG (ref 25.2–33.5)
MCHC RBC AUTO-ENTMCNC: 31.2 G/DL (ref 28.4–34.8)
MCV RBC AUTO: 92.8 FL (ref 82.6–102.9)
MONOCYTES # BLD: 13 % (ref 3–12)
NRBC AUTOMATED: 0 PER 100 WBC
PDW BLD-RTO: 13.4 % (ref 11.8–14.4)
PLATELET # BLD AUTO: 261 K/UL (ref 138–453)
PMV BLD AUTO: 10.7 FL (ref 8.1–13.5)
POTASSIUM SERPL-SCNC: 3.3 MMOL/L (ref 3.7–5.3)
RBC # BLD: 4.28 M/UL (ref 3.95–5.11)
SEG NEUTROPHILS: 57 % (ref 36–65)
SEGMENTED NEUTROPHILS ABSOLUTE COUNT: 3.64 K/UL (ref 1.5–8.1)
SODIUM SERPL-SCNC: 146 MMOL/L (ref 135–144)
WBC # BLD AUTO: 6.4 K/UL (ref 3.5–11.3)

## 2023-03-31 PROCEDURE — 99231 SBSQ HOSP IP/OBS SF/LOW 25: CPT | Performed by: INTERNAL MEDICINE

## 2023-03-31 PROCEDURE — 80048 BASIC METABOLIC PNL TOTAL CA: CPT

## 2023-03-31 PROCEDURE — 83735 ASSAY OF MAGNESIUM: CPT

## 2023-03-31 PROCEDURE — 6360000002 HC RX W HCPCS: Performed by: NURSE PRACTITIONER

## 2023-03-31 PROCEDURE — 2580000003 HC RX 258: Performed by: NURSE PRACTITIONER

## 2023-03-31 PROCEDURE — 85025 COMPLETE CBC W/AUTO DIFF WBC: CPT

## 2023-03-31 PROCEDURE — 1200000000 HC SEMI PRIVATE

## 2023-03-31 PROCEDURE — 6370000000 HC RX 637 (ALT 250 FOR IP): Performed by: NURSE PRACTITIONER

## 2023-03-31 PROCEDURE — 36415 COLL VENOUS BLD VENIPUNCTURE: CPT

## 2023-03-31 PROCEDURE — 2500000003 HC RX 250 WO HCPCS: Performed by: INTERNAL MEDICINE

## 2023-03-31 RX ADMIN — SODIUM CHLORIDE: 9 INJECTION, SOLUTION INTRAVENOUS at 21:51

## 2023-03-31 RX ADMIN — BUPROPION HYDROCHLORIDE 100 MG: 100 TABLET, EXTENDED RELEASE ORAL at 20:22

## 2023-03-31 RX ADMIN — PROPRANOLOL HYDROCHLORIDE 10 MG: 10 TABLET ORAL at 08:45

## 2023-03-31 RX ADMIN — POTASSIUM BICARBONATE 40 MEQ: 782 TABLET, EFFERVESCENT ORAL at 14:59

## 2023-03-31 RX ADMIN — CETIRIZINE HYDROCHLORIDE 10 MG: 10 TABLET, FILM COATED ORAL at 08:45

## 2023-03-31 RX ADMIN — VORTIOXETINE 20 MG: 20 TABLET, FILM COATED ORAL at 08:45

## 2023-03-31 RX ADMIN — CLONAZEPAM 1 MG: 0.5 TABLET ORAL at 18:04

## 2023-03-31 RX ADMIN — Medication 10 ML: at 08:46

## 2023-03-31 RX ADMIN — ENOXAPARIN SODIUM 40 MG: 100 INJECTION SUBCUTANEOUS at 08:45

## 2023-03-31 RX ADMIN — BUPROPION HYDROCHLORIDE 100 MG: 100 TABLET, EXTENDED RELEASE ORAL at 08:48

## 2023-03-31 RX ADMIN — PROPRANOLOL HYDROCHLORIDE 10 MG: 10 TABLET ORAL at 20:22

## 2023-03-31 RX ADMIN — CARIPRAZINE 1.5 MG: 1.5 CAPSULE, GELATIN COATED ORAL at 08:45

## 2023-03-31 RX ADMIN — FLUCONAZOLE 100 MG: 2 INJECTION, SOLUTION INTRAVENOUS at 18:05

## 2023-03-31 RX ADMIN — PANTOPRAZOLE SODIUM 40 MG: 40 TABLET, DELAYED RELEASE ORAL at 05:41

## 2023-03-31 RX ADMIN — ANTI-FUNGAL POWDER MICONAZOLE NITRATE TALC FREE: 1.42 POWDER TOPICAL at 20:29

## 2023-03-31 RX ADMIN — ANTI-FUNGAL POWDER MICONAZOLE NITRATE TALC FREE: 1.42 POWDER TOPICAL at 12:01

## 2023-03-31 RX ADMIN — SODIUM CHLORIDE: 9 INJECTION, SOLUTION INTRAVENOUS at 10:33

## 2023-03-31 RX ADMIN — FERROUS SULFATE TAB EC 325 MG (65 MG FE EQUIVALENT) 325 MG: 325 (65 FE) TABLET DELAYED RESPONSE at 08:45

## 2023-03-31 RX ADMIN — TRAMADOL HYDROCHLORIDE 100 MG: 50 TABLET, COATED ORAL at 20:21

## 2023-03-31 RX ADMIN — VANCOMYCIN HYDROCHLORIDE 1500 MG: 5 INJECTION, POWDER, LYOPHILIZED, FOR SOLUTION INTRAVENOUS at 18:10

## 2023-03-31 ASSESSMENT — PAIN DESCRIPTION - PAIN TYPE: TYPE: ACUTE PAIN

## 2023-03-31 ASSESSMENT — PAIN DESCRIPTION - FREQUENCY: FREQUENCY: CONTINUOUS

## 2023-03-31 ASSESSMENT — PAIN DESCRIPTION - ORIENTATION: ORIENTATION: LEFT;RIGHT

## 2023-03-31 ASSESSMENT — PAIN DESCRIPTION - ONSET: ONSET: ON-GOING

## 2023-03-31 ASSESSMENT — PAIN DESCRIPTION - DESCRIPTORS: DESCRIPTORS: BURNING;SORE

## 2023-03-31 ASSESSMENT — PAIN DESCRIPTION - LOCATION: LOCATION: BREAST

## 2023-03-31 ASSESSMENT — PAIN - FUNCTIONAL ASSESSMENT: PAIN_FUNCTIONAL_ASSESSMENT: PREVENTS OR INTERFERES SOME ACTIVE ACTIVITIES AND ADLS

## 2023-03-31 ASSESSMENT — PAIN SCALES - GENERAL: PAINLEVEL_OUTOF10: 7

## 2023-03-31 NOTE — PLAN OF CARE
Problem: Discharge Planning  Goal: Discharge to home or other facility with appropriate resources  Outcome: Progressing  Flowsheets (Taken 3/31/2023 0030)  Discharge to home or other facility with appropriate resources:   Identify barriers to discharge with patient and caregiver   Arrange for needed discharge resources and transportation as appropriate   Identify discharge learning needs (meds, wound care, etc)     Problem: Pain  Goal: Verbalizes/displays adequate comfort level or baseline comfort level  Outcome: Progressing     Problem: Safety - Adult  Goal: Free from fall injury  Outcome: Progressing     Problem: Nutrition Deficit:  Goal: Optimize nutritional status  Outcome: Progressing

## 2023-03-31 NOTE — PROGRESS NOTES
[address] [facility] Pharmacokinetic Monitoring Service - Vancomycin    Consulting Provider: Camille Harmon CNP   Indication: cellulitis under breasts  Target Concentration: Goal trough of 10-15 mg/L and AUC/TYLER <500 mg*hr/L  Day of Therapy: 3  Additional Antimicrobials: fluconazole    Pertinent Laboratory Values: Wt Readings from Last 1 Encounters:   03/31/23 195 lb (88.5 kg)     Temp Readings from Last 1 Encounters:   03/31/23 98.1 °F (36.7 °C) (Oral)     Estimated Creatinine Clearance: 88 mL/min (based on SCr of 0.74 mg/dL).   Recent Labs     03/30/23  0654 03/31/23  0648   CREATININE 0.66 0.74   WBC 8.5 6.4     Procalcitonin: none      Recent vancomycin administrations                     vancomycin (VANCOCIN) 1,500 mg in sodium chloride 0.9 % 250 mL IVPB (mg) 1,500 mg New Bag 03/30/23 2327    vancomycin (VANCOCIN) 1,250 mg in sodium chloride 0.9 % 250 mL IVPB (mg) 1,250 mg New Bag 03/30/23 0518    vancomycin (VANCOCIN) 2,000 mg in sodium chloride 0.9 % 500 mL IVPB (mg) 2,000 mg New Bag 03/29/23 1058                      Plan:  Current dosing regimen is therapeutic  Continue current dose  Pharmacy will continue to monitor patient and adjust therapy as indicated    Thank you for the consult,  ROBERTA [facility]  3/31/2023 11:49 AM

## 2023-03-31 NOTE — PLAN OF CARE
Problem: Discharge Planning  Goal: Discharge to home or other facility with appropriate resources  3/30/2023 2030 by Peggy Victoria RN  Outcome: Progressing  Note: Patient will return home following this admit. Problem: Pain  Goal: Verbalizes/displays adequate comfort level or baseline comfort level  3/30/2023 2030 by Peggy Victoria RN  Outcome: Progressing  Note: Patient is free from pain and will call out for needs. Patient states \"my front only hurts when it is touched. \"      Problem: Safety - Adult  Goal: Free from fall injury  Outcome: Progressing  Note: Patient will be free from falls this shift and is aware of personal limits. Problem: Nutrition Deficit:  Goal: Optimize nutritional status  Outcome: Progressing  Note: Patient eats appropriately per her dietary category.

## 2023-03-31 NOTE — PROGRESS NOTES
[facility]  Office: [address], DO, Rafa Jernigan, DO, Enrike Grimm, DO, Hleen Gillette Blood, DO, Александр Lange MD, Nirali Ha MD, Juancarlos Morris MD, Violet Monaco MD,  Jong Rodgers MD, Humble Hughes MD, Tej Deutsch, DO, Chetna Tesfaye MD,  Rambo Gillette MD, Sue Shepard MD, Sherry Mcdonald, DO, Seema Dexter MD, Rj Amos MD, Hai Swartz DO, Robby Del Toro MD, Preeti Saunders MD, Johnny Marcelino MD, Marielle Herrera MD,  Blaze Jimenez, DO, Keagan Stubbs MD,  Sahne Lennon, CNP,  Sirisha Forrester, CNP, Shirley Grande, CNP, Jojo Pierce, CNP,  Cruz Farley, DNP, Rosemary Ballard, CNP, Jace Osborn, CNP, Vinicio Paredes, CNP, Eve Bahena, CNP, Maritza Cancer, CNP, Hussain Arguello PA-C, Carlos Eduardo Alatorre, CNS, Bryce Marquez, CNP, Ann Marie Yovany, [facility]    Progress Note    3/31/2023    2:31 PM    Name:   Brianda Morrison  MRN:     [id number]     Acct:      [de-identified]   Room:   2109/2109-01  IP Day:  2  Admit Date:  3/29/2023  8:48 AM    PCP:   NORM Ventura CNP  Code Status:  Full Code    Subjective:     C/C:   Chief Complaint   Patient presents with    Cellulitis     Interval History Status: . Erythema under both breasts and inner thighs appears to have improved with vancomycin  Still has extensive intertrigo with skin maceration in breast folds      Brief History:     Brianda Morrison is a 62 y.o. female history of schizoaffective bipolar disorder, asthma and GERD who presents with Cellulitis and is admitted to the hospital for the management of Cellulitis. Patient states that she has had some bilateral breast irritation and a rash for the last few weeks. She states she is generally not been feeling well and complains of ongoing weakness and low-grade fevers. She states it is quite painful under both of her breasts and she has not been able to sleep because of the pain. ondansetron, polyethylene glycol, acetaminophen **OR** acetaminophen, traMADol **OR** traMADol    Data:     Past Medical History:   has a past medical history of Asthma, GERD (gastroesophageal reflux disease), Iron deficiency anemia, and Schizoaffective disorder, bipolar type ([address] Social History:   reports that she has never smoked. She has never used smokeless tobacco. She reports that she does not currently use alcohol. She reports that she does not use drugs. Family History:   Family History   Problem Relation Age of Onset    Heart Disease Mother     Cancer Father        Vitals:  /72   Pulse 82   Temp 98.1 °F (36.7 °C) (Oral)   Resp 16   Ht 5' 3\" (1.6 m)   Wt 195 lb (88.5 kg)   LMP 2016   SpO2 96%   BMI 34.54 kg/m²   Temp (24hrs), Av.9 °F (36.6 °C), Min:97.3 °F (36.3 °C), Max:98.4 °F (36.9 °C)    No results for input(s): POCGLU in the last 72 hours. I/O (24Hr): No intake or output data in the 24 hours ending 23 1431    Labs:  Hematology:  Recent Labs     23  0900 23  0654 23  0648   WBC 11.1 8.5 6.4   RBC 5.05 4.42 4.28   HGB 14.7 13.0 12.4   HCT 45.3 40.0 39.7   MCV 89.7 90.5 92.8   MCH 29.1 29.4 29.0   MCHC 32.5 32.5 31.2   RDW 13.2 13.2 13.4    269 261   MPV 11.5 10.4 10.7     Chemistry:  Recent Labs     23  0900 23  1654 23  0654 23  1442 23  0648     --  139  --  146*   K 2.5*   < > 2.9* 3.6* 3.3*   CL 95*  --  103  --  110*   CO2 26  --  25  --  28   GLUCOSE 124*  --  95  --  107*   BUN 8  --  4*  --  7   CREATININE 0.81  --  0.66  --  0.74   MG 1.7  --  1.6  --  1.7   ANIONGAP 16  --  11  --  8*   LABGLOM >60  --  >60  --  >60   CALCIUM 9.4  --  8.6  --  8.7    < > = values in this interval not displayed.      Recent Labs     23  0900   PROT 6.8   LABALBU 3.7   AST 16   ALT 13   ALKPHOS 95   BILITOT 0.5   BILIDIR 0.1   LIPASE 12*     ABG:No results found for: POCPH, PHART, PH, POCPCO2, ABT4XQH,

## 2023-04-01 VITALS
WEIGHT: 195 LBS | RESPIRATION RATE: 16 BRPM | HEART RATE: 90 BPM | SYSTOLIC BLOOD PRESSURE: 139 MMHG | TEMPERATURE: 98.1 F | HEIGHT: 63 IN | OXYGEN SATURATION: 92 % | DIASTOLIC BLOOD PRESSURE: 87 MMHG | BODY MASS INDEX: 34.55 KG/M2

## 2023-04-01 LAB
ABSOLUTE EOS #: 0.39 K/UL (ref 0–0.44)
ABSOLUTE IMMATURE GRANULOCYTE: 0.01 K/UL (ref 0–0.3)
ABSOLUTE LYMPH #: 1.49 K/UL (ref 1.1–3.7)
ABSOLUTE MONO #: 0.89 K/UL (ref 0.1–1.2)
ANION GAP SERPL CALCULATED.3IONS-SCNC: 9 MMOL/L (ref 9–17)
BASOPHILS # BLD: 1 % (ref 0–2)
BASOPHILS ABSOLUTE: 0.06 K/UL (ref 0–0.2)
BUN SERPL-MCNC: 5 MG/DL (ref 6–20)
BUN/CREAT BLD: 8 (ref 9–20)
CALCIUM SERPL-MCNC: 8.8 MG/DL (ref 8.6–10.4)
CHLORIDE SERPL-SCNC: 105 MMOL/L (ref 98–107)
CO2 SERPL-SCNC: 31 MMOL/L (ref 20–31)
CREAT SERPL-MCNC: 0.66 MG/DL (ref 0.5–0.9)
EOSINOPHILS RELATIVE PERCENT: 5 % (ref 1–4)
GFR SERPL CREATININE-BSD FRML MDRD: >60 ML/MIN/1.73M2
GLUCOSE SERPL-MCNC: 100 MG/DL (ref 70–99)
HCT VFR BLD AUTO: 40.6 % (ref 36.3–47.1)
HGB BLD-MCNC: 12.9 G/DL (ref 11.9–15.1)
IMMATURE GRANULOCYTES: 0 %
LYMPHOCYTES # BLD: 20 % (ref 24–43)
MAGNESIUM SERPL-MCNC: 1.7 MG/DL (ref 1.6–2.6)
MCH RBC QN AUTO: 29.1 PG (ref 25.2–33.5)
MCHC RBC AUTO-ENTMCNC: 31.8 G/DL (ref 28.4–34.8)
MCV RBC AUTO: 91.6 FL (ref 82.6–102.9)
MONOCYTES # BLD: 12 % (ref 3–12)
NRBC AUTOMATED: 0 PER 100 WBC
PDW BLD-RTO: 13.4 % (ref 11.8–14.4)
PLATELET # BLD AUTO: 255 K/UL (ref 138–453)
PMV BLD AUTO: 10 FL (ref 8.1–13.5)
POTASSIUM SERPL-SCNC: 3.2 MMOL/L (ref 3.7–5.3)
RBC # BLD: 4.43 M/UL (ref 3.95–5.11)
SEG NEUTROPHILS: 62 % (ref 36–65)
SEGMENTED NEUTROPHILS ABSOLUTE COUNT: 4.8 K/UL (ref 1.5–8.1)
SODIUM SERPL-SCNC: 145 MMOL/L (ref 135–144)
WBC # BLD AUTO: 7.6 K/UL (ref 3.5–11.3)

## 2023-04-01 PROCEDURE — 83735 ASSAY OF MAGNESIUM: CPT

## 2023-04-01 PROCEDURE — 85025 COMPLETE CBC W/AUTO DIFF WBC: CPT

## 2023-04-01 PROCEDURE — 99239 HOSP IP/OBS DSCHRG MGMT >30: CPT | Performed by: NURSE PRACTITIONER

## 2023-04-01 PROCEDURE — 6370000000 HC RX 637 (ALT 250 FOR IP): Performed by: NURSE PRACTITIONER

## 2023-04-01 PROCEDURE — 6360000002 HC RX W HCPCS: Performed by: NURSE PRACTITIONER

## 2023-04-01 PROCEDURE — 36415 COLL VENOUS BLD VENIPUNCTURE: CPT

## 2023-04-01 PROCEDURE — 2580000003 HC RX 258: Performed by: NURSE PRACTITIONER

## 2023-04-01 PROCEDURE — 80048 BASIC METABOLIC PNL TOTAL CA: CPT

## 2023-04-01 RX ORDER — POTASSIUM CHLORIDE 20 MEQ/1
40 TABLET, EXTENDED RELEASE ORAL ONCE
Status: DISCONTINUED | OUTPATIENT
Start: 2023-04-01 | End: 2023-04-01 | Stop reason: HOSPADM

## 2023-04-01 RX ORDER — FLUCONAZOLE 2 MG/ML
100 INJECTION, SOLUTION INTRAVENOUS EVERY 24 HOURS
Status: DISCONTINUED | OUTPATIENT
Start: 2023-04-01 | End: 2023-04-01 | Stop reason: HOSPADM

## 2023-04-01 RX ORDER — FLUCONAZOLE 100 MG/1
100 TABLET ORAL DAILY
Qty: 1 TABLET | Refills: 0 | Status: SHIPPED | OUTPATIENT
Start: 2023-04-02 | End: 2023-04-03

## 2023-04-01 RX ORDER — TRAMADOL HYDROCHLORIDE 50 MG/1
50 TABLET ORAL EVERY 6 HOURS PRN
Qty: 20 TABLET | Refills: 0 | Status: SHIPPED | OUTPATIENT
Start: 2023-04-01 | End: 2023-04-06

## 2023-04-01 RX ORDER — POTASSIUM CHLORIDE 750 MG/1
10 TABLET, EXTENDED RELEASE ORAL DAILY
Qty: 30 TABLET | Refills: 1 | Status: SHIPPED | OUTPATIENT
Start: 2023-04-01 | End: 2023-05-01

## 2023-04-01 RX ORDER — CLINDAMYCIN HYDROCHLORIDE 300 MG/1
300 CAPSULE ORAL 4 TIMES DAILY
Qty: 40 CAPSULE | Refills: 0 | Status: SHIPPED | OUTPATIENT
Start: 2023-04-01 | End: 2023-04-06

## 2023-04-01 RX ORDER — TOPIRAMATE 50 MG/1
50 TABLET, FILM COATED ORAL DAILY
Qty: 60 TABLET | Refills: 3 | Status: CANCELLED | OUTPATIENT
Start: 2023-04-01

## 2023-04-01 RX ADMIN — CETIRIZINE HYDROCHLORIDE 10 MG: 10 TABLET, FILM COATED ORAL at 09:13

## 2023-04-01 RX ADMIN — VANCOMYCIN HYDROCHLORIDE 1500 MG: 5 INJECTION, POWDER, LYOPHILIZED, FOR SOLUTION INTRAVENOUS at 11:50

## 2023-04-01 RX ADMIN — PANTOPRAZOLE SODIUM 40 MG: 40 TABLET, DELAYED RELEASE ORAL at 09:13

## 2023-04-01 RX ADMIN — POTASSIUM CHLORIDE 40 MEQ: 1500 TABLET, EXTENDED RELEASE ORAL at 09:14

## 2023-04-01 RX ADMIN — FLUCONAZOLE 100 MG: 200 INJECTION, SOLUTION INTRAVENOUS at 10:42

## 2023-04-01 RX ADMIN — PROPRANOLOL HYDROCHLORIDE 10 MG: 10 TABLET ORAL at 09:13

## 2023-04-01 RX ADMIN — FERROUS SULFATE TAB EC 325 MG (65 MG FE EQUIVALENT) 325 MG: 325 (65 FE) TABLET DELAYED RESPONSE at 09:13

## 2023-04-01 RX ADMIN — CARIPRAZINE 1.5 MG: 1.5 CAPSULE, GELATIN COATED ORAL at 09:13

## 2023-04-01 RX ADMIN — Medication 10 ML: at 09:14

## 2023-04-01 RX ADMIN — VORTIOXETINE 20 MG: 20 TABLET, FILM COATED ORAL at 09:14

## 2023-04-01 RX ADMIN — ANTI-FUNGAL POWDER MICONAZOLE NITRATE TALC FREE: 1.42 POWDER TOPICAL at 13:29

## 2023-04-01 RX ADMIN — BUPROPION HYDROCHLORIDE 100 MG: 100 TABLET, EXTENDED RELEASE ORAL at 09:13

## 2023-04-01 RX ADMIN — ENOXAPARIN SODIUM 40 MG: 100 INJECTION SUBCUTANEOUS at 09:14

## 2023-04-01 ASSESSMENT — PAIN SCALES - GENERAL: PAINLEVEL_OUTOF10: 0

## 2023-04-01 NOTE — PROGRESS NOTES
[address] [facility] Pharmacokinetic Monitoring Service - Vancomycin    Consulting Provider: [facility]   Indication: Cellulitis under breasts  Target Concentration: Goal trough of 10-15 mg/L and AUC/TYLER <500 mg*hr/L  Day of Therapy: 4  Additional Antimicrobials: Fluconazole    Pertinent Laboratory Values: Wt Readings from Last 1 Encounters:   03/31/23 195 lb (88.5 kg)     Temp Readings from Last 1 Encounters:   04/01/23 98.1 °F (36.7 °C) (Oral)     Estimated Creatinine Clearance: 99 mL/min (based on SCr of 0.66 mg/dL). Recent Labs     03/31/23  0648 04/01/23  0635   CREATININE 0.74 0.66   WBC 6.4 7.6     Pertinent Cultures:  Culture, Blood 1 [[id number]] Collected: 03/29/23 1034   Order Status: Completed Specimen: Blood Updated: 04/01/23 1239    Specimen Description . BLOOD    Special Requests LAC 15ML    Culture NO GROWTH 3 DAYS   Culture, Blood 2 [[id number]] Collected: 03/29/23 0900   Order Status: Completed Specimen: Blood Updated: 04/01/23 1239    Specimen Description . BLOOD    Special Requests RH 10ML    Culture NO GROWTH 3 DAYS     MRSA Nasal Swab: N/A. Non-respiratory infection.     Recent vancomycin administrations                     vancomycin (VANCOCIN) 1,500 mg in sodium chloride 0.9 % 250 mL IVPB (mg) 1,500 mg New Bag 04/01/23 1150     1,500 mg New Bag 03/31/23 1810     1,500 mg New Bag 03/30/23 2327    vancomycin (VANCOCIN) 1,250 mg in sodium chloride 0.9 % 250 mL IVPB (mg) 1,250 mg New Bag 03/30/23 0518                  Plan:  Current dosing regimen is therapeutic  Continue current dose  Pharmacy will continue to monitor patient and adjust therapy as indicated    Thank you for the consult,  Vicente Chicas, [facility]  4/1/2023 5:40 PM

## 2023-04-01 NOTE — PROGRESS NOTES
Patient assisted with shower and dressing per RN and Max Negrete PCT. Patient shown how to thoroughly dry under skin folds and apply micotin powder as well as Interdry cloth strips. Return demonstration performed and patient did well. Extra Interdry strips provided to patient for discharge. Patient assisted with dressing, night gown and socks from home put on. Shoes at the bedside. All other belonging packed and at the bedside. NATA completed. Discharge instructions reviewed at length with patient at the bedside. Reassurance provided to patient. [employer] called for patient. Patient assisted into [facility] and discharged out of main entrance.

## 2023-04-01 NOTE — DISCHARGE SUMMARY
[facility]  Office: [address], DO, Ainsley Roberta, DO, Anytyrone Downing, DO, Esperanza Iram Senior, DO, Mark Nowak MD, Ambrosio Humphrey MD, Rosie Amaro MD, Rene White MD,  dJ Thornton MD, Deandra Mukherjee MD, Adalgisa Zhou, DO, Jay Kendall MD,  Carlitos Carrillo MD, Catie Cooper MD, Everton Miller, DO, Rod Tirado MD, Antonio Dong MD, Robinson Lim DO, Allie Blue MD, Michelle Mcmahan MD, Jasmin Naidu MD, Tyrone Flores MD,  Ele Felix, DO, Catherine Richard MD,  Howard Johnson, CNP,  Bertin Avendaño, CNP, Ulysses Parks, CNP, Zita Guerra, CNP,  Chadwick Cason, [facility], Eddi Dowsn, CNP, Anita Conklin, CNP, Gracie Dhillon, CNP, Sharri Subramanian, CNP, Sheela Leone, CNP, Maritza Burgos, PA-C, Sultana Benjamin, CNS, Christopher Aragon, CNP, Jacinto Arteaga, [facility]    Discharge Summary     Patient ID: Ge Mckeon  :  1965   MRN: [id number]     ACCOUNT:  [de-identified]   Patient's PCP: NORM Eddy CNP  Admit Date: 3/29/2023   Discharge Date: 2023     Length of Stay: 3  Code Status:  Full Code  Admitting Physician: Burnette Frankel, MD  Discharge Physician: NORM Rees NP     Active Discharge Diagnoses:     Hospital Problem Lists:  Principal Problem:    Cellulitis  Active Problems:    Asthma    GERD (gastroesophageal reflux disease)    Iron deficiency anemia    Hypokalemia    Intertrigo-breast folds  Resolved Problems:    * No resolved hospital problems. *      Admission Condition:  fair     Discharged Condition: stable    Hospital Stay:     Hospital Course:  Ge Mckeon is a 62 y.o. female with a history of schizoaffective bipolar disorder, asthma and GERD who was admitted for the management of  Cellulitis , presented to ER with rash under bilateral breasts and inner thighs.   Patient was started on IV vancomycin and IV Diflucan for the treatment of cellulitic

## 2023-04-01 NOTE — CARE COORDINATION
Spoke to Allie with [facility] with referral and can accept pt. NATA initiated. The Plan for Transition of Care is related to the following treatment goals: SN to assess areas of intertrigo    The Patient was provided with a choice of provider and agrees   with the discharge plan. [x] Yes [] No    Freedom of choice list was provided with basic dialogue that supports the patient's individualized plan of care/goals, treatment preferences and shares the quality data associated with the providers.  [x] Yes [] No

## 2023-04-01 NOTE — PLAN OF CARE
Problem: Discharge Planning  Goal: Discharge to home or other facility with appropriate resources  Outcome: Adequate for Discharge  Flowsheets (Taken 4/1/2023 0915)  Discharge to home or other facility with appropriate resources:   Identify barriers to discharge with patient and caregiver   Arrange for needed discharge resources and transportation as appropriate   Identify discharge learning needs (meds, wound care, etc)   Arrange for interpreters to assist at discharge as needed   Refer to discharge planning if patient needs post-hospital services based on physician order or complex needs related to functional status, cognitive ability or social support system     Problem: Pain  Goal: Verbalizes/displays adequate comfort level or baseline comfort level  Outcome: Adequate for Discharge     Problem: Safety - Adult  Goal: Free from fall injury  Outcome: Adequate for Discharge  Flowsheets (Taken 4/1/2023 0915)  Free From Fall Injury:   Instruct family/caregiver on patient safety   Based on caregiver fall risk screen, instruct family/caregiver to ask for assistance with transferring infant if caregiver noted to have fall risk factors     Problem: Nutrition Deficit:  Goal: Optimize nutritional status  Outcome: Adequate for Discharge

## 2023-04-01 NOTE — PROGRESS NOTES
[facility]  Office: [address], DO, Mynor Watkins, DO, Marcos Base, DO, Madhav Acuña Blood, DO, Minerva Shen MD, Delma Albarado MD, Bart Parekh MD, Elisha Myers MD,  Nain Mendez MD, Cristela Ramirez MD, Tita Sol DO, Sara Joy MD,  Mario Snow MD, Cuba Castro MD, Jose Juan Meyer, DO, Abigail Silva MD, Jocelin Amaya MD, Lex Zhao, DO, Vimal Shelley MD, Suki Dumont MD, Doris Mejía MD, Benson Everett MD,  Nikkie White DO, Kevin Fox MD,  Deidra Ruiz CNP,  Patti Castillo CNP, Vinnie Su CNP, Dea Velazquez CNP,  Denver Parks, DNP, Marcela Baez, CNP, Alcides Bruno, CNP, Ashley Dc, CNP, Shena Neal, CNP, Linda Doll, CNP, Mary Santillan PA-C, Benedict Alanis, [facility], Parish Pretty, CNP, Carlos Stevens, [facility]    Progress Note    4/1/2023    8:53 AM     Name:   Julia Whiteside  MRN:     [id number]     Acct:      [de-identified]   Room:   2109/2109-01   Day:  3  Admit Date:  3/29/2023  8:48 AM    PCP:   NORM Malin CNP  Code Status:  Full Code    Subjective:     C/C:   Chief Complaint   Patient presents with    Cellulitis     Interval History Status: improved. Patient is sitting up in bed, She does get easily tearful/anxious, support provided and questions answered. She is worries about taking a shower because she is afraid it will burn and she would also like help. VSS     Brief History:   Julia Whiteside is a 62 y.o. female history of schizoaffective bipolar disorder, asthma and GERD who presents with Cellulitis and is admitted to the hospital for the management of Cellulitis. Patient states that she has had some bilateral breast irritation and a rash for the last few weeks. She states she is generally not been feeling well and complains of ongoing weakness and low-grade fevers.   She states it is quite painful under both of her Component Value Date/Time    SPECIAL LAC 15ML 03/29/2023 10:34 AM     Lab Results   Component Value Date/Time    CULTURE NO GROWTH 2 DAYS 03/29/2023 10:34 AM       Radiology:  XR CHEST PORTABLE    Result Date: 3/29/2023  No acute process. Physical Examination:        General appearance:  alert, anxious at times but pleasant and cooperative and no distress  Mental Status:  oriented to person, place and time and normal affect  Lungs:  clear to auscultation bilaterally, normal effort  Heart:  regular rate and rhythm, no murmur  Abdomen:  soft, nontender, nondistended, normal bowel sounds, no masses, hepatomegaly, splenomegaly  Extremities:  no edema, redness, tenderness in the calves  Skin: Erythema under bilateral breast and inner thighs slowly improving   Assessment:        Hospital Problems             Last Modified POA    * (Principal) Cellulitis 3/29/2023 Yes    Asthma 3/29/2023 Yes    GERD (gastroesophageal reflux disease) 3/29/2023 Yes    Iron deficiency anemia 3/29/2023 Yes    Hypokalemia 3/30/2023 Yes    Intertrigo-breast folds 3/31/2023 Yes       Plan:        Skin infection appears to be combination of cellulitic and fungal in nature-continue IV vancomycin, pharmacy to dose and Diflucan. Continue Interdry and micotin powder under bilateral breasts.  Will transition to oral today for discharge   discontinue telemetry monitoring  Discontinue IV fluids  Hypokalemia-improved but has been consistently low every, will gove 40 MEQ's today and add supplementation daily at discharge   Shower today and provide education on how to apply Micotin powder with assistance of staff   Home with home care later today   Plan discussed with patient and staff     NORM Rutherford NP  4/1/2023  8:53 AM

## 2023-04-01 NOTE — DISCHARGE INSTRUCTIONS
Directions For Dermatitis Site Care:     - Wash underneath breast and abdominal folds daily.   - Be sure to pat dry the areas thoroughly before applying powder and Interdry cloth strips. Don't worry Johny Baker, you got this!

## 2023-04-01 NOTE — DISCHARGE INSTR - COC
Continuity of Care Form    Patient Name: aCrey Flores   :  1965  MRN:  [id number]    Admit date:  3/29/2023  Discharge date:  23    Code Status Order: Full Code   Advance Directives:     Admitting Physician:  Nathalie Rapp MD  PCP: NORM Fritz CNP    Discharging Nurse: [facility] Unit/Room#: 2109/2109-01  Discharging Unit Phone Number: [phone]    Emergency Contact:   Extended Emergency Contact Information  Primary Emergency Contact: Delon Vazquez  Address:  [facility], [address] [place] [address] Phone: [phone]  Mobile Phone: [phone]  Relation: Parent    Past Surgical History:  Past Surgical History:   Procedure Laterality Date    NM HEMIARTHROPLASTY HIP PARTIAL      Hip Replacement, Partial, left       Immunization History: There is no immunization history on file for this patient. Active Problems:  Patient Active Problem List   Diagnosis Code    Schizoaffective disorder, bipolar type ([facility] F25.0    Closed fracture of nasal bones S02. 2XXA    Fracture, orbit, closed, initial encounter ([facility] [address] S02.85XA    Facial laceration S01.81XA    Cellulitis L03.90    Asthma J45.909    GERD (gastroesophageal reflux disease) K21.9    Iron deficiency anemia D50.9    Hypokalemia E87.6    Intertrigo-breast folds L30.4       Isolation/Infection:   Isolation            No Isolation          Patient Infection Status       Infection Onset Added Last Indicated Last Indicated By Review Planned Expiration Resolved Resolved By    None active    Resolved    C-diff Rule Out 23 Clostridium Difficile Toxin/Antigen (Ordered)   23 Gita Teague RN    Order             Nurse Assessment:  Last Vital Signs: BP (!) 145/86   Pulse 88   Temp 98.6 °F (37 °C)   Resp 18   Ht 5' 3\" (1.6 m)   Wt 195 lb (88.5 kg)   LMP 2016   SpO2 95%   BMI 34.54 kg/m²     Last documented pain score (0-10 scale): Pain Level: 7  Last Weight:   Wt Readings from Last 1 Encounters:   03/31/23 195 lb (88.5 kg)     Mental Status:  oriented and alert    IV Access:  - None    Nursing Mobility/ADLs:  Walking   Independent  Transfer  Independent  Bathing  Assisted  Dressing  Independent  Toileting  Independent  Feeding  Independent  Med Admin  Assisted  Med Delivery   crushed and prefers mixed with pudding or applesauce    Wound Care Documentation and Therapy:        Elimination:  Continence: Bowel: Yes  Bladder: Yes  Urinary Catheter: None   Colostomy/Ileostomy/Ileal Conduit: No       Date of Last BM: 3/30/23    Intake/Output Summary (Last 24 hours) at 4/1/2023 0930  Last data filed at 4/1/2023 0221  Gross per 24 hour   Intake 1960 ml   Output --   Net 1960 ml     I/O last 3 completed shifts: In: 1960 [P.O.:960; I.V.:750; IV Piggyback:250]  Out: -     Safety Concerns: At Risk for Falls    Impairments/Disabilities:      None    Nutrition Therapy:  Current Nutrition Therapy:   - Oral Diet:  General    Routes of Feeding: Oral  Liquids: Thin Liquids  Daily Fluid Restriction: no  Last Modified Barium Swallow with Video (Video Swallowing Test): not done    Treatments at the Time of Hospital Discharge:   Respiratory Treatments: n/a  Oxygen Therapy:  is not on home oxygen therapy.   Ventilator:    - No ventilator support    Rehab Therapies: Physical Therapy and Occupational Therapy  Weight Bearing Status/Restrictions: No weight bearing restrictions  Other Medical Equipment (for information only, NOT a DME order):  bath bench  Other Treatments: Skilled nursing assessment  Med teaching and compliance    Patient's personal belongings (please select all that are sent with patient):  Glasses    RN SIGNATURE:  Electronically signed by Jimena Howell RN on 4/1/23 at 12:31 PM EDT    CASE MANAGEMENT/SOCIAL WORK SECTION    Inpatient Status Date: ***    Readmission Risk Assessment Score:  Readmission Risk              Risk of Unplanned Readmission:

## 2023-04-02 LAB
MICROORGANISM SPEC CULT: NORMAL
MICROORGANISM SPEC CULT: NORMAL
SERVICE CMNT-IMP: NORMAL
SERVICE CMNT-IMP: NORMAL
SPECIMEN DESCRIPTION: NORMAL
SPECIMEN DESCRIPTION: NORMAL

## 2023-09-30 ENCOUNTER — HOSPITAL ENCOUNTER (EMERGENCY)
Age: 58
Discharge: ANOTHER HOSPITAL | End: 2023-10-01
Attending: EMERGENCY MEDICINE
Payer: COMMERCIAL

## 2023-09-30 DIAGNOSIS — R45.851 SUICIDAL IDEATION: Primary | ICD-10-CM

## 2023-09-30 LAB
ANION GAP SERPL CALCULATED.3IONS-SCNC: 10 MMOL/L (ref 9–17)
BASOPHILS # BLD: <0.03 K/UL (ref 0–0.2)
BASOPHILS NFR BLD: 0 % (ref 0–2)
BUN SERPL-MCNC: 10 MG/DL (ref 6–20)
BUN/CREAT SERPL: 14 (ref 9–20)
CALCIUM SERPL-MCNC: 9.4 MG/DL (ref 8.6–10.4)
CHLORIDE SERPL-SCNC: 103 MMOL/L (ref 98–107)
CO2 SERPL-SCNC: 26 MMOL/L (ref 20–31)
CREAT SERPL-MCNC: 0.7 MG/DL (ref 0.5–0.9)
EOSINOPHIL # BLD: 0.21 K/UL (ref 0–0.44)
EOSINOPHILS RELATIVE PERCENT: 3 % (ref 1–4)
ERYTHROCYTE [DISTWIDTH] IN BLOOD BY AUTOMATED COUNT: 15.3 % (ref 11.8–14.4)
GFR SERPL CREATININE-BSD FRML MDRD: >60 ML/MIN/1.73M2
GLUCOSE SERPL-MCNC: 105 MG/DL (ref 70–99)
HCT VFR BLD AUTO: 38.8 % (ref 36.3–47.1)
HGB BLD-MCNC: 12.5 G/DL (ref 11.9–15.1)
IMM GRANULOCYTES # BLD AUTO: 0.02 K/UL (ref 0–0.3)
IMM GRANULOCYTES NFR BLD: 0 %
LYMPHOCYTES NFR BLD: 1.06 K/UL (ref 1.1–3.7)
LYMPHOCYTES RELATIVE PERCENT: 13 % (ref 24–43)
MCH RBC QN AUTO: 29.8 PG (ref 25.2–33.5)
MCHC RBC AUTO-ENTMCNC: 32.2 G/DL (ref 28.4–34.8)
MCV RBC AUTO: 92.4 FL (ref 82.6–102.9)
MONOCYTES NFR BLD: 0.72 K/UL (ref 0.1–1.2)
MONOCYTES NFR BLD: 9 % (ref 3–12)
NEUTROPHILS NFR BLD: 75 % (ref 36–65)
NEUTS SEG NFR BLD: 6.37 K/UL (ref 1.5–8.1)
NRBC BLD-RTO: 0 PER 100 WBC
PLATELET # BLD AUTO: 285 K/UL (ref 138–453)
PMV BLD AUTO: 10 FL (ref 8.1–13.5)
POTASSIUM SERPL-SCNC: 4.1 MMOL/L (ref 3.7–5.3)
RBC # BLD AUTO: 4.2 M/UL (ref 3.95–5.11)
RBC # BLD: ABNORMAL 10*6/UL
SODIUM SERPL-SCNC: 139 MMOL/L (ref 135–144)
WBC OTHER # BLD: 8.4 K/UL (ref 3.5–11.3)

## 2023-09-30 PROCEDURE — 81001 URINALYSIS AUTO W/SCOPE: CPT

## 2023-09-30 PROCEDURE — 85025 COMPLETE CBC W/AUTO DIFF WBC: CPT

## 2023-09-30 PROCEDURE — 6370000000 HC RX 637 (ALT 250 FOR IP): Performed by: EMERGENCY MEDICINE

## 2023-09-30 PROCEDURE — 80307 DRUG TEST PRSMV CHEM ANLYZR: CPT

## 2023-09-30 PROCEDURE — 93005 ELECTROCARDIOGRAM TRACING: CPT | Performed by: EMERGENCY MEDICINE

## 2023-09-30 PROCEDURE — 99285 EMERGENCY DEPT VISIT HI MDM: CPT

## 2023-09-30 PROCEDURE — 80048 BASIC METABOLIC PNL TOTAL CA: CPT

## 2023-09-30 RX ORDER — ACETAMINOPHEN 500 MG
1000 TABLET ORAL ONCE
Status: COMPLETED | OUTPATIENT
Start: 2023-09-30 | End: 2023-09-30

## 2023-09-30 RX ADMIN — ACETAMINOPHEN 1000 MG: 500 TABLET ORAL at 23:47

## 2023-09-30 ASSESSMENT — PAIN - FUNCTIONAL ASSESSMENT: PAIN_FUNCTIONAL_ASSESSMENT: 0-10

## 2023-09-30 ASSESSMENT — PAIN SCALES - GENERAL
PAINLEVEL_OUTOF10: 7
PAINLEVEL_OUTOF10: 10

## 2023-10-01 ENCOUNTER — HOSPITAL ENCOUNTER (INPATIENT)
Age: 58
LOS: 11 days | Discharge: HOME | End: 2023-10-12
Attending: PSYCHIATRY & NEUROLOGY | Admitting: PSYCHIATRY & NEUROLOGY
Payer: COMMERCIAL

## 2023-10-01 VITALS
HEIGHT: 63 IN | TEMPERATURE: 98.1 F | SYSTOLIC BLOOD PRESSURE: 111 MMHG | OXYGEN SATURATION: 96 % | HEART RATE: 74 BPM | WEIGHT: 162 LBS | DIASTOLIC BLOOD PRESSURE: 73 MMHG | RESPIRATION RATE: 18 BRPM | BODY MASS INDEX: 28.7 KG/M2

## 2023-10-01 PROBLEM — R45.851 DEPRESSION WITH SUICIDAL IDEATION: Status: ACTIVE | Noted: 2023-10-01

## 2023-10-01 PROBLEM — F33.3 SEVERE RECURRENT MAJOR DEPRESSION WITH PSYCHOTIC FEATURES (HCC): Status: ACTIVE | Noted: 2023-10-01

## 2023-10-01 PROBLEM — F32.A DEPRESSION WITH SUICIDAL IDEATION: Status: ACTIVE | Noted: 2023-10-01

## 2023-10-01 LAB
AMPHET UR QL SCN: NEGATIVE
BACTERIA URNS QL MICRO: ABNORMAL
BARBITURATES UR QL SCN: NEGATIVE
BENZODIAZ UR QL: NEGATIVE
BILIRUB UR QL STRIP: ABNORMAL
CANNABINOIDS UR QL SCN: NEGATIVE
CLARITY UR: CLEAR
COCAINE UR QL SCN: NEGATIVE
COLOR UR: YELLOW
EPI CELLS #/AREA URNS HPF: ABNORMAL /HPF (ref 0–5)
FENTANYL UR QL: NEGATIVE
GLUCOSE UR STRIP-MCNC: NEGATIVE MG/DL
HGB UR QL STRIP.AUTO: NEGATIVE
KETONES UR STRIP-MCNC: ABNORMAL MG/DL
LEUKOCYTE ESTERASE UR QL STRIP: NEGATIVE
METHADONE UR QL: POSITIVE
MUCOUS THREADS URNS QL MICRO: ABNORMAL
NITRITE UR QL STRIP: NEGATIVE
OPIATES UR QL SCN: NEGATIVE
OXYCODONE UR QL SCN: NEGATIVE
PCP UR QL SCN: NEGATIVE
PH UR STRIP: 6.5 [PH] (ref 5–8)
PROT UR STRIP-MCNC: NEGATIVE MG/DL
RBC #/AREA URNS HPF: ABNORMAL /HPF (ref 0–2)
SP GR UR STRIP: 1.02 (ref 1–1.03)
TEST INFORMATION: ABNORMAL
UROBILINOGEN UR STRIP-ACNC: NORMAL EU/DL (ref 0–1)
WBC #/AREA URNS HPF: ABNORMAL /HPF (ref 0–5)

## 2023-10-01 PROCEDURE — APPSS60 APP SPLIT SHARED TIME 46-60 MINUTES: Performed by: PSYCHIATRY & NEUROLOGY

## 2023-10-01 PROCEDURE — 6370000000 HC RX 637 (ALT 250 FOR IP): Performed by: PSYCHIATRY & NEUROLOGY

## 2023-10-01 PROCEDURE — 1240000000 HC EMOTIONAL WELLNESS R&B

## 2023-10-01 PROCEDURE — 99222 1ST HOSP IP/OBS MODERATE 55: CPT | Performed by: PSYCHIATRY & NEUROLOGY

## 2023-10-01 RX ORDER — HYDROXYZINE 50 MG/1
50 TABLET, FILM COATED ORAL 3 TIMES DAILY PRN
Status: DISCONTINUED | OUTPATIENT
Start: 2023-10-01 | End: 2023-10-12 | Stop reason: HOSPADM

## 2023-10-01 RX ORDER — DOCUSATE SODIUM 100 MG/1
100 CAPSULE, LIQUID FILLED ORAL DAILY PRN
COMMUNITY

## 2023-10-01 RX ORDER — LANOLIN ALCOHOL/MO/W.PET/CERES
CREAM (GRAM) TOPICAL 2 TIMES DAILY
Status: DISCONTINUED | OUTPATIENT
Start: 2023-10-01 | End: 2023-10-12 | Stop reason: HOSPADM

## 2023-10-01 RX ORDER — VENLAFAXINE HYDROCHLORIDE 150 MG/1
150 CAPSULE, EXTENDED RELEASE ORAL DAILY
Status: ON HOLD | COMMUNITY
End: 2023-10-01

## 2023-10-01 RX ORDER — IBUPROFEN 400 MG/1
400 TABLET ORAL EVERY 6 HOURS PRN
Status: DISCONTINUED | OUTPATIENT
Start: 2023-10-01 | End: 2023-10-12 | Stop reason: HOSPADM

## 2023-10-01 RX ORDER — POLYETHYLENE GLYCOL 3350 17 G/17G
17 POWDER, FOR SOLUTION ORAL DAILY PRN
Status: DISCONTINUED | OUTPATIENT
Start: 2023-10-01 | End: 2023-10-12 | Stop reason: HOSPADM

## 2023-10-01 RX ORDER — DOCUSATE SODIUM 100 MG/1
100 CAPSULE, LIQUID FILLED ORAL DAILY PRN
Status: DISCONTINUED | OUTPATIENT
Start: 2023-10-01 | End: 2023-10-12 | Stop reason: HOSPADM

## 2023-10-01 RX ORDER — FERROUS SULFATE 325(65) MG
325 TABLET ORAL
Status: DISCONTINUED | OUTPATIENT
Start: 2023-10-02 | End: 2023-10-04

## 2023-10-01 RX ORDER — PROPRANOLOL HYDROCHLORIDE 20 MG/1
10 TABLET ORAL 2 TIMES DAILY
Status: DISCONTINUED | OUTPATIENT
Start: 2023-10-01 | End: 2023-10-12 | Stop reason: HOSPADM

## 2023-10-01 RX ORDER — TRAZODONE HYDROCHLORIDE 50 MG/1
50 TABLET ORAL NIGHTLY PRN
Status: DISCONTINUED | OUTPATIENT
Start: 2023-10-01 | End: 2023-10-12 | Stop reason: HOSPADM

## 2023-10-01 RX ORDER — ACETAMINOPHEN 325 MG/1
650 TABLET ORAL EVERY 6 HOURS PRN
Status: DISCONTINUED | OUTPATIENT
Start: 2023-10-01 | End: 2023-10-12 | Stop reason: HOSPADM

## 2023-10-01 RX ORDER — BUPROPION HYDROCHLORIDE 100 MG/1
100 TABLET, EXTENDED RELEASE ORAL 2 TIMES DAILY
Status: DISCONTINUED | OUTPATIENT
Start: 2023-10-01 | End: 2023-10-08

## 2023-10-01 RX ORDER — MAGNESIUM HYDROXIDE/ALUMINUM HYDROXICE/SIMETHICONE 120; 1200; 1200 MG/30ML; MG/30ML; MG/30ML
30 SUSPENSION ORAL EVERY 6 HOURS PRN
Status: DISCONTINUED | OUTPATIENT
Start: 2023-10-01 | End: 2023-10-12 | Stop reason: HOSPADM

## 2023-10-01 RX ADMIN — BUPROPION HYDROCHLORIDE 100 MG: 100 TABLET, EXTENDED RELEASE ORAL at 15:09

## 2023-10-01 RX ADMIN — PROPRANOLOL HYDROCHLORIDE 10 MG: 20 TABLET ORAL at 14:53

## 2023-10-01 RX ADMIN — BUPROPION HYDROCHLORIDE 100 MG: 100 TABLET, EXTENDED RELEASE ORAL at 22:26

## 2023-10-01 RX ADMIN — Medication: at 23:17

## 2023-10-01 RX ADMIN — PROPRANOLOL HYDROCHLORIDE 10 MG: 20 TABLET ORAL at 22:27

## 2023-10-01 RX ADMIN — TRAZODONE HYDROCHLORIDE 50 MG: 50 TABLET ORAL at 22:27

## 2023-10-01 RX ADMIN — VORTIOXETINE 20 MG: 20 TABLET, FILM COATED ORAL at 14:53

## 2023-10-01 RX ADMIN — HYDROXYZINE HYDROCHLORIDE 50 MG: 50 TABLET, FILM COATED ORAL at 22:28

## 2023-10-01 RX ADMIN — Medication: at 15:07

## 2023-10-01 ASSESSMENT — LIFESTYLE VARIABLES
HOW OFTEN DO YOU HAVE A DRINK CONTAINING ALCOHOL: NEVER
HOW MANY STANDARD DRINKS CONTAINING ALCOHOL DO YOU HAVE ON A TYPICAL DAY: PATIENT DOES NOT DRINK

## 2023-10-01 ASSESSMENT — PATIENT HEALTH QUESTIONNAIRE - PHQ9
SUM OF ALL RESPONSES TO PHQ9 QUESTIONS 1 & 2: 0
SUM OF ALL RESPONSES TO PHQ QUESTIONS 1-9: 0
SUM OF ALL RESPONSES TO PHQ QUESTIONS 1-9: 0
1. LITTLE INTEREST OR PLEASURE IN DOING THINGS: 0
SUM OF ALL RESPONSES TO PHQ QUESTIONS 1-9: 0
2. FEELING DOWN, DEPRESSED OR HOPELESS: 0
SUM OF ALL RESPONSES TO PHQ QUESTIONS 1-9: 0

## 2023-10-01 ASSESSMENT — SLEEP AND FATIGUE QUESTIONNAIRES
DO YOU HAVE DIFFICULTY SLEEPING: NO
AVERAGE NUMBER OF SLEEP HOURS: 6
DO YOU USE A SLEEP AID: NO

## 2023-10-01 ASSESSMENT — PAIN - FUNCTIONAL ASSESSMENT: PAIN_FUNCTIONAL_ASSESSMENT: NONE - DENIES PAIN

## 2023-10-01 ASSESSMENT — PAIN SCALES - GENERAL: PAINLEVEL_OUTOF10: 0

## 2023-10-01 NOTE — PROGRESS NOTES
Pharmacy Medication History Note      List of current medications patient is taking is complete. Source of information: OARRS, Chart, [employer] [employer])     Changes made to medication list:  Medications removed (include reason, ex. therapy complete or physician discontinued, noncompliance):  None    Medications flagged for provider review:  Maria Del Rosario [place] Clonazepam - last filled 5/22/23   Miconazole - last filled 4/2/23    Medications added/doses adjusted: Added Venlafaxine     Other notes (ex. Recent course of antibiotics, Coumadin dosing):  Per OARRS - Clonazepam last dispensed 5/22/23       Current Home Medication List at Time of Admission:  Prior to Admission medications    Medication Sig   venlafaxine (EFFEXOR XR) 150 MG extended release capsule Take 1 capsule by mouth daily   docusate sodium (COLACE) 100 MG capsule Take 1 capsule by mouth daily as needed for Constipation   fluticasone furoate-vilanterol (BREO ELLIPTA) 200-25 MCG/ACT AEPB inhaler Inhale 1 puff into the lungs daily   ferrous sulfate (IRON 325) 325 (65 Fe) MG tablet Take 1 tablet by mouth daily (with breakfast)   Cholecalciferol (VITAMIN D3) 1.25 MG (01572 UT) CAPS Take 1 capsule by mouth once a week Indications: Fridays   VORTIoxetine HBr (TRINTELLIX) 20 MG TABS tablet Take 1 tablet by mouth daily   fexofenadine (ALLEGRA) 180 MG tablet Take 1 tablet by mouth daily as needed   omeprazole (PRILOSEC) 40 MG delayed release capsule Take 1 capsule by mouth daily   propranolol (INDERAL) 10 MG tablet Take 1 tablet by mouth 2 times daily   buPROPion (WELLBUTRIN SR) 100 MG extended release tablet Take 1 tablet by mouth 2 times daily         Please let me know if you have any questions about this encounter. Thank you!     Electronically signed by ROBERTA [facility] on 10/1/2023 at 11:30 AM

## 2023-10-01 NOTE — ED NOTES
ED to inpatient nurses report     Chief Complaint   Patient presents with    Suicidal      Present to ED from home  LOC: alert and orientated to name, place, date  Vital signs   Vitals:    09/30/23 2231 09/30/23 2245 09/30/23 2300 09/30/23 2315   BP: 114/81 137/80 122/83 114/82   Pulse: 86 84 88 89   Resp: 17 12 15 18   Temp:       TempSrc:       SpO2: 97% 97% 96% 97%   Weight:       Height:          Oxygen Baseline room air    Current needs required room air    LDAs:    Mobility: Independent  Fall Risk:    Pending ED orders: None  Present condition: Stable   Code Status: Full  Consults: None  []  Hospitalist  Completed  [] yes [] no Who:   []  Medicine  Completed  [] yes [] No Who:   []  Cardiology  Completed  [] yes [] No Who:   []  GI   Completed  [] yes [] No Who:   []  Neurology  Completed  [] yes [] No Who:   []  Nephrology Completed  [] yes [] No Who:    []  Vascular  Completed  [] yes [] No Who:   []  Ortho  Completed  [] yes [] No Who:     []  Surgery  Completed  [] yes [] No Who:    []  Urology  Completed  [] yes [] No Who:    []  CT Surgery Completed  [] yes [] No Who:   []  Podiatry  Completed  [] yes [] No Who:    []  Other    Completed  [] yes [] No Who:  Interventions: Labs, EKG and medication (Tylenol).   Important Events:        Electronically signed by Jb Anne RN on 10/1/2023 at 2:11 AM      Jb Anne RN  10/01/23 9714
[] Have the patient's belongings been placed out of control of the patient? [] [x] Have the patient and his/her belongings been checked for contraband? [] [x] Is the patient under any visitor restrictions? If Yes, explain:   [] [x] Is the patient under an alias? [address] [place] [address] Name:   Authorized visitors (no more than two are to be on the list)   Name/Relationship:   Name/Relationship:    NURSING CARE PLAN    Nursing Diagnosis: Risk of Self Directed Harm  [] Actual  [x] Potential  Date Started: 10/1/23      Etiological Factors: (related to)  [x] Expressed or implied suicidal ideation/behavior  [x] Depression  [] Suicide attempt      [x] Low self-esteem  [] Hallucinations      [x] Feeling of Hopelessness  [] Substance abuse or withdrawal    [x] Dysfunctional family  [] Major traumatic event, eg., divorce, etc   [] Excessive stress/anxiety    10/1/23    Expected Outcomes    Patient will:   [x] Patient will remain safe for the duration of their stay   [x] Patient's environment will be safe, eg. Free of potential suicide weapons   [x] Verbalize Recovery from suicidal episode and improvement in self-worth   [x] Discuss feeling that precipitated suicide attempt/thoughts/behavior   [x] Will describe available resources for crisis prevention and management   [x] Will verbalize positive coping skills     Nursing Intervention   [x] Assessment and Observations hourly   [x] Suicide Precautions implemented with patient, should be 1:1 observation   [x] Document observation [id number] and RN assessment hourly   [x] Consult physician for:    [x] Psychiatric consult    [] Pharmacological therapy    [] Other:    [] Patient search completed by security   [x] Initiated appropriate safety protocols by removing from the patient's environment anything that could be used to inflict self injury, eg.  Order safe tray, snap gown, etc   [x] Maintain open, warm, caring, non-judgmental attitude/manner towards patient   [] Discuss advantages and
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
## 2023-10-01 NOTE — PROGRESS NOTES
Behavioral Services  Medicare Certification Upon Admission    I certify that this patient's inpatient psychiatric hospital admission is medically necessary for:    [x] (1) Treatment which could reasonably be expected to improve this patient's condition,       [x] (2) Or for diagnostic study;     AND     [x](2) The inpatient psychiatric services are provided while the individual is under the care of a physician and are included in the individualized plan of care.     Estimated length of stay/service 3-5 days    Plan for post-hospital care hc    Electronically signed by Con Rodriguez MD on 10/1/2023 at 9:40 AM

## 2023-10-01 NOTE — PROGRESS NOTES
[facility]  Admission Note     Admission Type:   Admission Type: Involuntary    Reason for admission:  Reason for Admission: depression with suicidal thoughts to overdose      Addictive Behavior:   Addictive Behavior  In the Past 3 Months, Have You Felt or Has Someone Told You That You Have a Problem With  : None    Medical Problems:   Past Medical History:   Diagnosis Date    Asthma     GERD (gastroesophageal reflux disease)     Iron deficiency anemia     Schizoaffective disorder, bipolar type (HCC)        Status EXAM:  Mental Status and Behavioral Exam  Normal: No  Level of Assistance: Needs encouragement  Facial Expression: Flat  Affect: Blunt  Level of Consciousness: Alert  Frequency of Checks: 4 times per hour, close  Mood:Normal: No  Mood: Depressed, Anxious, Worthless, low self-esteem, Helpless, Sad  Motor Activity:Normal: No  Motor Activity: Decreased  Eye Contact: Fair  Observed Behavior: Cooperative  Sexual Misconduct History: Current - no  Preception: Others (comment)  Attention:Normal: No  Attention: Distractible, Unable to concentrate  Thought Processes: Blocking  Thought Content:Normal: No  Thought Content: Poverty of content  Depression Symptoms: Feelings of helplessness, Crying, Feelings of hopelessess, Feelings of worthlessness  Anxiety Symptoms: Generalized  Herlinda Symptoms: No problems reported or observed.   Hallucinations: None  Delusions: No  Memory:Normal: No  Memory: Poor recent, Poor remote  Insight and Judgment: No  Insight and Judgment: Poor judgment, Poor insight, Unmotivated    Tobacco Screening:  Practical Counseling, on admission, marisabel X, if applicable and completed (first 3 are required if patient doesn't refuse):            ( ) Recognizing danger situations (included triggers and roadblocks)                    ( ) Coping skills (new ways to manage stress,relaxation techniques, changing routine, distraction)                                                           ( )

## 2023-10-01 NOTE — ED PROVIDER NOTES
and reactive to light. Cardiovascular:      Rate and Rhythm: Normal rate and regular rhythm. Heart sounds: Normal heart sounds. Pulmonary:      Effort: Pulmonary effort is normal. No respiratory distress. Breath sounds: Normal breath sounds. Abdominal:      General: Bowel sounds are normal.      Palpations: Abdomen is soft. Tenderness: There is no abdominal tenderness. Musculoskeletal:         General: Normal range of motion. Skin:     General: Skin is warm and dry. Neurological:      Mental Status: She is alert and oriented to person, place, and time. Psychiatric:         Mood and Affect: Affect is tearful. Speech: Speech is rapid and pressured. MEDICAL DECISION MAKING / ED COURSE:   Summary of Patient Presentation:        1)  Number and Complexity of Problems  Problem List This Visit: Suicidal ideation      Pertinent Comorbid Conditions: Depression, schizoaffective disorder, bipolar   2)  Data Reviewed  My EKG interpretation: Sinus rhythm ventricular rate 77  No significant ST or T wave changes  P.o. 132  QTc 439  Overall unremarkable EKG        See more data below for the lab and radiology tests and orders. 3)  Treatment and Disposition    Patient repeat assessment: Patient remains clinically stable here in the ED. She has not had any issues with aggression or violence. Patient is medically cleared from the emergency room. Disposition discussion with patient/family: Case discussed with JESUS at [facility]. She will be admitted to their inpatient psychiatric facility. \"ED Course\" Notes From Epic Narrator:         CRITICAL CARE:       PROCEDURES:    Procedures      DATA FOR LAB AND RADIOLOGY TESTS ORDERED BELOW ARE REVIEWED BY THE ED CLINICIAN:    RADIOLOGY: All x-rays, CT, MRI, and formal ultrasound images (except ED bedside ultrasound) are read by the radiologist, see reports below, unless otherwise noted in MDM or here.   Reports below are reviewed

## 2023-10-02 LAB
AMPHET UR QL SCN: NEGATIVE
BARBITURATES UR QL SCN: NEGATIVE
BENZODIAZ UR QL: NEGATIVE
CANNABINOIDS UR QL SCN: NEGATIVE
COCAINE UR QL SCN: NEGATIVE
EKG ATRIAL RATE: 77 BPM
EKG P AXIS: 29 DEGREES
EKG P-R INTERVAL: 132 MS
EKG Q-T INTERVAL: 388 MS
EKG QRS DURATION: 76 MS
EKG QTC CALCULATION (BAZETT): 439 MS
EKG R AXIS: -10 DEGREES
EKG T AXIS: 31 DEGREES
EKG VENTRICULAR RATE: 77 BPM
FENTANYL UR QL: NEGATIVE
METHADONE UR QL: POSITIVE
OPIATES UR QL SCN: NEGATIVE
OXYCODONE UR QL SCN: NEGATIVE
PCP UR QL SCN: NEGATIVE
TEST INFORMATION: ABNORMAL

## 2023-10-02 PROCEDURE — 99222 1ST HOSP IP/OBS MODERATE 55: CPT | Performed by: INTERNAL MEDICINE

## 2023-10-02 PROCEDURE — 6370000000 HC RX 637 (ALT 250 FOR IP): Performed by: PSYCHIATRY & NEUROLOGY

## 2023-10-02 PROCEDURE — 6370000000 HC RX 637 (ALT 250 FOR IP): Performed by: INTERNAL MEDICINE

## 2023-10-02 PROCEDURE — 99232 SBSQ HOSP IP/OBS MODERATE 35: CPT | Performed by: PSYCHIATRY & NEUROLOGY

## 2023-10-02 PROCEDURE — 80307 DRUG TEST PRSMV CHEM ANLYZR: CPT

## 2023-10-02 PROCEDURE — APPSS30 APP SPLIT SHARED TIME 16-30 MINUTES: Performed by: NURSE PRACTITIONER

## 2023-10-02 PROCEDURE — 1240000000 HC EMOTIONAL WELLNESS R&B

## 2023-10-02 RX ORDER — BUDESONIDE AND FORMOTEROL FUMARATE DIHYDRATE 80; 4.5 UG/1; UG/1
2 AEROSOL RESPIRATORY (INHALATION)
Status: DISCONTINUED | OUTPATIENT
Start: 2023-10-02 | End: 2023-10-12 | Stop reason: HOSPADM

## 2023-10-02 RX ORDER — PANTOPRAZOLE SODIUM 40 MG/1
40 TABLET, DELAYED RELEASE ORAL
Status: DISCONTINUED | OUTPATIENT
Start: 2023-10-03 | End: 2023-10-05

## 2023-10-02 RX ADMIN — TRAZODONE HYDROCHLORIDE 50 MG: 50 TABLET ORAL at 21:34

## 2023-10-02 RX ADMIN — PROPRANOLOL HYDROCHLORIDE 10 MG: 20 TABLET ORAL at 21:34

## 2023-10-02 RX ADMIN — BUDESONIDE AND FORMOTEROL FUMARATE DIHYDRATE 2 PUFF: 80; 4.5 AEROSOL RESPIRATORY (INHALATION) at 21:32

## 2023-10-02 RX ADMIN — BUPROPION HYDROCHLORIDE 100 MG: 100 TABLET, EXTENDED RELEASE ORAL at 21:35

## 2023-10-02 RX ADMIN — HYDROXYZINE HYDROCHLORIDE 50 MG: 50 TABLET, FILM COATED ORAL at 21:34

## 2023-10-02 RX ADMIN — Medication: at 21:31

## 2023-10-02 ASSESSMENT — PAIN DESCRIPTION - DESCRIPTORS: DESCRIPTORS: SORE

## 2023-10-02 ASSESSMENT — PAIN SCALES - GENERAL
PAINLEVEL_OUTOF10: 3
PAINLEVEL_OUTOF10: 5

## 2023-10-02 ASSESSMENT — PAIN DESCRIPTION - PAIN TYPE: TYPE: ACUTE PAIN

## 2023-10-02 ASSESSMENT — PAIN DESCRIPTION - LOCATION: LOCATION: TOE (COMMENT WHICH ONE)

## 2023-10-02 NOTE — PLAN OF CARE
[facility]  Initial Interdisciplinary Treatment Plan NO      Original treatment plan Date & Time: 10/2/2023   1305    Admission Type:  Admission Type:  Involuntary    Reason for admission:   Reason for Admission: depression with suicidal thoughts to overdose    Estimated Length of Stay:  5-7days  Estimated Discharge Date: to be determined by physician    PATIENT STRENGTHS:  Patient Strengths:   Patient Strengths and Limitations:Limitations: Difficult relationships / poor social skills, Difficulty problem solving/relies on others to help solve problems, Inappropriate/potentially harmful leisure interests, Apathetic / unmotivated  Addictive Behavior: Addictive Behavior  In the Past 3 Months, Have You Felt or Has Someone Told You That You Have a Problem With  : None  Medical Problems:  Past Medical History:   Diagnosis Date    Asthma     GERD (gastroesophageal reflux disease)     Iron deficiency anemia     Schizoaffective disorder, bipolar type ([address])      Status EXAM:Mental Status and Behavioral Exam  Normal: No  Level of Assistance: Independent/Self  Facial Expression: Sad, Flat, Worried  Affect: Blunt  Level of Consciousness: Alert  Frequency of Checks: 4 times per hour, close  Mood:Normal: No  Mood: Depressed, Anxious, Helpless, Sad, Worthless, low self-esteem  Motor Activity:Normal: No  Motor Activity: Decreased  Eye Contact: Fair  Observed Behavior: Cooperative  Sexual Misconduct History: Current - no  Preception: [place] to person, [place] to place, [place] to time  Attention:Normal: No  Attention: Unable to concentrate  Thought Processes: Blocking  Thought Content:Normal: No  Thought Content: Poverty of content, Paranoia, Preoccupations  Depression Symptoms: Feelings of helplessness, Feelings of hopelessess, Feelings of worthlessness, Impaired concentration, Isolative  Anxiety Symptoms: Generalized  Herlinda Symptoms: Poor judgment  Hallucinations: None  Delusions: No  Memory:Normal: No  Memory: Poor recent, Poor remote  Insight and Judgment: No  Insight and Judgment: Poor judgment, Poor insight, Unrealistic    EDUCATION:   Learner Progress Toward Treatment Goals: reviewed group plans and strategies for care    Method:group therapy, medication compliance, individualized assessments and care planning    Outcome: needs reinforcement    PATIENT GOALS: short term-pt refused meeting            Long term-  PLAN/TREATMENT RECOMMENDATIONS:     continue group therapy , medications compliance, goal setting, individualized assessments and care, continue to monitor pt on unit      SHORT-TERM GOALS:   Time frame for Short-Term Goals: 5-7 days    LONG-TERM GOALS:  Time frame for Long-Term Goals: 6 months  Members Present in Team Meeting: See Signature Sheet    Damian Cisneros RN

## 2023-10-02 NOTE — GROUP NOTE
Group Therapy Note    Date: 10/2/2023    Group Start Time: 0570  Group End Time: 1115  Group Topic: Cognitive Skills    JORDAN BHMAGDALENE Ann     Cognitive Skills Group Note        Date: October 2, 2023 Start Time:  1045 am   End Time:  1115am       Number of Participants in Group & Unit Census:  5/11    Topic: cognitive skills     Goal of Group: pt will demonstrate improved coping skills and improved social skills       Comments:     Patient did not participate in Cognitive Skills group, despite staff encouragement and explanation of benefits. Patient remain seclusive to self. Q15 minute safety checks maintained for patient safety and will continue to encourage patient to attend unit programming.             Signature:  Gwendolyn Moreira

## 2023-10-02 NOTE — PLAN OF CARE
Patient denies thoughts of suicide and denies thoughts of self harm. Patient remains free from falls and injury. Reports depression and anxiety are still \"bad. \"  Patient reports she is having pain in her toes, but denies the need for pain medication interventions. Christopher Burkett is encouraged to report to staff if pain worsens and she wants pain medications and staff will assess pain throughout shift. Patient is encouraged to attend groups and participate to help develop positive coping skills. Staff will work with patient on developing coping skills. Problem: Self Harm/Suicidality  Goal: Will have no self-injury during hospital stay  Description: INTERVENTIONS:  1. Ensure constant observer at bedside with Q15M safety checks  2. Maintain a safe environment  3. Secure patient belongings  4. Ensure family/visitors adhere to safety recommendations  5. Ensure safety tray has been added to patient's diet order  6. Every shift and PRN: Re-assess suicidal risk via Frequent Screener    10/2/2023 1031 by Delon Medrano RN  Outcome: Progressing     Problem: Depression  Goal: Will be euthymic at discharge  Description: INTERVENTIONS:  1. Administer medication as ordered  2. Provide emotional support via 1:1 interaction with staff  3. Encourage involvement in milieu/groups/activities  4.  Monitor for social isolation  10/2/2023 1031 by Delon Medrano RN  Outcome: Progressing     Problem: Pain  Goal: Verbalizes/displays adequate comfort level or baseline comfort level  Outcome: Not Progressing     Problem: Pain  Goal: Verbalizes/displays adequate comfort level or baseline comfort level  Outcome: Not Progressing

## 2023-10-02 NOTE — BH NOTE
Yuni Paredes complained of \"not feeling right\" this morning. She presents as scared and anxious. She endorses anxiety and depression. She refused breakfast and morning medications. She reports to this writer, \"I can't go home. I don't want to go home. I need to get out of here to somewhere else. \" When asked if she does not feel safe at home, she says, \"yes. \" She has been observed crying out in her sleep a few times this morning for a few seconds at a time. When lunch trays arrived, she was asked if she would like to eat some lunch and she refused. She also refused her morning medications at this time. She remains isolative to room and sleeping. Will alert provider.

## 2023-10-02 NOTE — PLAN OF CARE
Problem: Self Harm/Suicidality  Goal: Will have no self-injury during hospital stay  Description: INTERVENTIONS:  1. Ensure constant observer at bedside with Q15M safety checks  2. Maintain a safe environment  3. Secure patient belongings  4. Ensure family/visitors adhere to safety recommendations  5. Ensure safety tray has been added to patient's diet order  6. Every shift and PRN: Re-assess suicidal risk via Frequent Screener    Outcome: Progressing     Problem: Depression  Goal: Will be euthymic at discharge  Description: INTERVENTIONS:  1. Administer medication as ordered  2. Provide emotional support via 1:1 interaction with staff  3. Encourage involvement in milieu/groups/activities  4. Monitor for social isolation  Outcome: Progressing     Patient is not answering questions regarding assessment, patient only states \"I just want to die\" when asked anything. Patient yelling out from bed. Patient was given as needed medications, tolerated well, door closed to promote rest. Patient compliant with medication.
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
## 2023-10-02 NOTE — GROUP NOTE
Group Therapy Note    Date: 10/2/2023    Group Start Time: 1430  Group End Time: 2031  Group Topic: Relaxation    JORDAN BHI MAGDALENE Pisano    Relaxation Group Note        Date: October 2, 2023 Start Time: 2:30pm  End Time:  315pm      Number of Participants in Group & Unit Census:  3/10    Topic: relaxation group    Goal of Group:pt will identify benefits of using art for coping and relaxation       Comments:     Patient did not participate in Relaxation group, despite staff encouragement and explanation of benefits. Patient remain seclusive to self. Q15 minute safety checks maintained for patient safety and will continue to encourage patient to attend unit programming.             Signature:  Gwendolyn Moreira

## 2023-10-02 NOTE — PROGRESS NOTES
Physical Therapy        Physical Therapy Cancel Note      DATE: 10/2/2023    NAME: Saravanan Santos  MRN: [id number]   : 1965      Patient not seen this date for Physical Therapy due to:    Patient at baseline functional level with no acute PT needs and demonstrates independence with functional mobility. Upon arrival pt is resting in bed and with a prompt to mobilize within the room the patient reports \". .but it's so cold in here. \", \"I'm feeling weak. \", \"do we have to go far? \". Writer offers much encouragement with pt agreeing to then mobilize to restroom. Pt performs bed mobility, transfers, and enters restroom to toilet in presence of this writer, entirely unassisted. Pt performs toileting and hand hygiene appropriately and returns to bed per her wishes. Will defer PT evaluation at this time. Please reorder PT if future needs arise.      Time: 2338 - 7373    Electronically signed by Ervin Hurtado PT on 10/2/2023 at 5:04 PM

## 2023-10-03 LAB — TSH SERPL DL<=0.05 MIU/L-ACNC: 1.54 UIU/ML (ref 0.3–5)

## 2023-10-03 PROCEDURE — 6370000000 HC RX 637 (ALT 250 FOR IP): Performed by: PSYCHIATRY & NEUROLOGY

## 2023-10-03 PROCEDURE — 6370000000 HC RX 637 (ALT 250 FOR IP): Performed by: INTERNAL MEDICINE

## 2023-10-03 PROCEDURE — APPSS30 APP SPLIT SHARED TIME 16-30 MINUTES: Performed by: NURSE PRACTITIONER

## 2023-10-03 PROCEDURE — 99232 SBSQ HOSP IP/OBS MODERATE 35: CPT | Performed by: PSYCHIATRY & NEUROLOGY

## 2023-10-03 PROCEDURE — 36415 COLL VENOUS BLD VENIPUNCTURE: CPT

## 2023-10-03 PROCEDURE — 1240000000 HC EMOTIONAL WELLNESS R&B

## 2023-10-03 PROCEDURE — 84443 ASSAY THYROID STIM HORMONE: CPT

## 2023-10-03 RX ADMIN — ALUMINUM HYDROXIDE, MAGNESIUM HYDROXIDE, AND SIMETHICONE 30 ML: 200; 200; 20 SUSPENSION ORAL at 09:38

## 2023-10-03 RX ADMIN — BUDESONIDE AND FORMOTEROL FUMARATE DIHYDRATE 2 PUFF: 80; 4.5 AEROSOL RESPIRATORY (INHALATION) at 08:45

## 2023-10-03 RX ADMIN — FERROUS SULFATE TAB 325 MG (65 MG ELEMENTAL FE) 325 MG: 325 (65 FE) TAB at 08:45

## 2023-10-03 RX ADMIN — PANTOPRAZOLE SODIUM 40 MG: 40 TABLET, DELAYED RELEASE ORAL at 08:45

## 2023-10-03 RX ADMIN — BUPROPION HYDROCHLORIDE 100 MG: 100 TABLET, EXTENDED RELEASE ORAL at 08:51

## 2023-10-03 RX ADMIN — PROPRANOLOL HYDROCHLORIDE 10 MG: 20 TABLET ORAL at 09:00

## 2023-10-03 RX ADMIN — HYDROXYZINE HYDROCHLORIDE 50 MG: 50 TABLET, FILM COATED ORAL at 08:45

## 2023-10-03 RX ADMIN — VORTIOXETINE 20 MG: 20 TABLET, FILM COATED ORAL at 08:45

## 2023-10-03 ASSESSMENT — PAIN SCALES - GENERAL: PAINLEVEL_OUTOF10: 3

## 2023-10-03 NOTE — PLAN OF CARE
TSH normal  Thank you for the consult. Medicine will sign off. Please do not hesitate to contact us for any issues or concerns.    Jorge Luna MD

## 2023-10-03 NOTE — GROUP NOTE
Group Therapy Note    Date: 10/3/2023    Group Start Time: 8602  Group End Time: 1658  Group Topic: Cognitive Skills    [facility] D Chari Boast, CTRS    Cognitive Skills Group Note        Date: October 3, 2023 Start Time: 1045am   End Time: 1125am      Number of Participants in Group & Unit Census:  4/10    Topic: cognitive skills     Goal of Group:pt will demonstrate improved coping skills and improved decision making skills       Comments:     Patient did not participate in Cognitive Skills group, despite staff encouragement and explanation of benefits. Patient remain seclusive to self. Q15 minute safety checks maintained for patient safety and will continue to encourage patient to attend unit programming.             Signature:  Jersey [address]

## 2023-10-03 NOTE — GROUP NOTE
Group Therapy Note    Date: 10/3/2023    Group Start Time: 0900  Group End Time: 0940  Group Topic: Community Meeting    JORDAN Trevizo LPN        Group Therapy Note    Attendees: 4/10    patient refused to attend Goals group at 0900 after encouragement from staff.   1:1 talk time provided as alternative to group session

## 2023-10-03 NOTE — PLAN OF CARE
Problem: Self Harm/Suicidality  Goal: Will have no self-injury during hospital stay  Description: INTERVENTIONS:  1. Ensure constant observer at bedside with Q15M safety checks  2. Maintain a safe environment  3. Secure patient belongings  4. Ensure family/visitors adhere to safety recommendations  5. Ensure safety tray has been added to patient's diet order  6. Every shift and PRN: Re-assess suicidal risk via Frequent Screener    10/3/2023 1138 by Chinyere Daniel RN  Outcome: Progressing     Problem: Depression  Goal: Will be euthymic at discharge  Description: INTERVENTIONS:  1. Administer medication as ordered  2. Provide emotional support via 1:1 interaction with staff  3. Encourage involvement in milieu/groups/activities  4. Monitor for social isolation  10/3/2023 1138 by Chinyere Daniel RN  Outcome: Progressing     Problem: Pain  Goal: Verbalizes/displays adequate comfort level or baseline comfort level  10/3/2023 1138 by Chinyere Daniel RN  Outcome: Progressing   NOTE:  1:1 with patient for ten minutes. Pt encouraged to attend unit programming and interact with peers and staff. Pt also encouraged to tend to hygiene and ADLs. Pt encouraged to discuss feelings with staff and feedback and reassurance provided. Pt denies thoughts of self-harm and is agreeable to seeking staff out should thoughts of self-harm arise. Safe environment maintained. Every 15-minute checks for safety continues per unit policy. Will continue to monitor for safety and provides support and reassurance as needed. Patient has requested home medications. Patient present anxious and guarded surrounding medication administration but is compliant with medications. Isolated to room for long intervals.   Refused breakfast.

## 2023-10-03 NOTE — GROUP NOTE
Group Therapy Note    Date: 10/3/2023    Group Start Time: 1330  Group End Time: 9209  Group Topic: Recreational    [facility] KANDI Vail, CTRS     Relaxation Group Note        Date: October 3, 2023 Start Time: 1:30pm  End Time:  315pm      Number of Participants in Group & Unit Census:  4/8    Topic: recreation therapy     Goal of Group:pt will demonstrate improved coping skills and improved leisure awareness       Comments:     Patient did not participate in Recreation group, despite staff encouragement and explanation of benefits. Patient remain seclusive to self. Q15 minute safety checks maintained for patient safety and will continue to encourage patient to attend unit programming.               Signature:  Farnaz Elena

## 2023-10-03 NOTE — PLAN OF CARE
Problem: Self Harm/Suicidality  Goal: Will have no self-injury during hospital stay  Description: INTERVENTIONS:  1. Ensure constant observer at bedside with Q15M safety checks  2. Maintain a safe environment  3. Secure patient belongings  4. Ensure family/visitors adhere to safety recommendations  5. Ensure safety tray has been added to patient's diet order  6. Every shift and PRN: Re-assess suicidal risk via Frequent Screener    Outcome: Progressing  Flowsheets (Taken 10/3/2023 0703)  Will have no self-injury during hospital stay:   Maintain a safe environment   Every shift and PRN: Re-assess suicidal risk via Frequent Screener  Note: Pt reports depression and anxiety rates both at 9/10. Pt denies any suicidal or homicidal ideations, denies any hallucinations. Pt agreed to seek staff and report any intrusive thoughts of hurting self or others. Pt remains free from any self-harm, safe environment maintained. Regular rounding 4 times an hour and spontaneous patient checks done for safety and per unit policy. Will continue to provide emotional support and reassurance as needed. Problem: Depression  Goal: Will be euthymic at discharge  Description: INTERVENTIONS:  1. Administer medication as ordered  2. Provide emotional support via 1:1 interaction with staff  3. Encourage involvement in milieu/groups/activities  4. Monitor for social isolation  Outcome: Progressing  Note: Pt isolative to room, out for needs. Rates depression at 9/1-10     Problem: Pain  Goal: Verbalizes/displays adequate comfort level or baseline comfort level  Outcome: Progressing  Flowsheets (Taken 10/3/2023 0703)  Verbalizes/displays adequate comfort level or baseline comfort level:   Encourage patient to monitor pain and request assistance   Assess pain using appropriate pain scale  Note: Pt denies any pain or discomfort during shift assessment.

## 2023-10-03 NOTE — GROUP NOTE
Group Therapy Note    Date: 10/3/2023    Group Start Time: 1000  Group End Time: 1030  Group Topic: Psychotherapy    JORDAN Ramirez        Group Therapy Note    Attendees: 4/10       Patient declined to attend psychotherapy group after encouragement from staff. 1:1 talk time offered but refused. Signature:   Jaime Ramirez

## 2023-10-03 NOTE — PROGRESS NOTES
[address] [place] [address] Drive  Date: 10/3/23  Patient Name: Torie Ferrera       Room: 0965/7776-22  MRN: [id number]   Account #: [de-identified]    : 1965  (62 y.o.)  Gender: female     REASON FOR MISSED TREATMENT:  OT initially called I at 1300 and was told that pt had just had an episode of emesis and was not feeling well. OT called back to check on pt at 1400 and was told that she was doing better. OT went to pt's room who said she was feeling better but still nauseous. Pt initially agreeable to speak with OT but nausea increased during conversation and pt requested OT try again another day. During short conversation pt did state that she was getting up to the bathroom per self without difficulty, but that she had not taken a shower due to fear of falling and being \"too cold\". -    Pt agreeable to being rechecked by OT at a later date.   [id number]    Electronically signed by MICAELA Patel on 10/3/23 at 2:19 PM EDT

## 2023-10-04 PROCEDURE — 6370000000 HC RX 637 (ALT 250 FOR IP): Performed by: PSYCHIATRY & NEUROLOGY

## 2023-10-04 PROCEDURE — 6360000002 HC RX W HCPCS: Performed by: PSYCHIATRY & NEUROLOGY

## 2023-10-04 PROCEDURE — 1240000000 HC EMOTIONAL WELLNESS R&B

## 2023-10-04 PROCEDURE — 97535 SELF CARE MNGMENT TRAINING: CPT

## 2023-10-04 PROCEDURE — 97166 OT EVAL MOD COMPLEX 45 MIN: CPT

## 2023-10-04 PROCEDURE — 97161 PT EVAL LOW COMPLEX 20 MIN: CPT

## 2023-10-04 PROCEDURE — APPSS30 APP SPLIT SHARED TIME 16-30 MINUTES: Performed by: NURSE PRACTITIONER

## 2023-10-04 PROCEDURE — 6370000000 HC RX 637 (ALT 250 FOR IP): Performed by: INTERNAL MEDICINE

## 2023-10-04 PROCEDURE — 99232 SBSQ HOSP IP/OBS MODERATE 35: CPT | Performed by: PSYCHIATRY & NEUROLOGY

## 2023-10-04 RX ORDER — LORAZEPAM 1 MG/1
2 TABLET ORAL EVERY 4 HOURS PRN
Status: DISCONTINUED | OUTPATIENT
Start: 2023-10-04 | End: 2023-10-12 | Stop reason: HOSPADM

## 2023-10-04 RX ORDER — ONDANSETRON 4 MG/1
8 TABLET, FILM COATED ORAL EVERY 8 HOURS PRN
Status: DISCONTINUED | OUTPATIENT
Start: 2023-10-04 | End: 2023-10-12 | Stop reason: HOSPADM

## 2023-10-04 RX ORDER — HALOPERIDOL 5 MG/ML
5 INJECTION INTRAMUSCULAR EVERY 4 HOURS PRN
Status: DISCONTINUED | OUTPATIENT
Start: 2023-10-04 | End: 2023-10-12 | Stop reason: HOSPADM

## 2023-10-04 RX ORDER — FERROUS SULFATE 325(65) MG
325 TABLET ORAL
Status: DISCONTINUED | OUTPATIENT
Start: 2023-10-05 | End: 2023-10-12 | Stop reason: HOSPADM

## 2023-10-04 RX ORDER — HALOPERIDOL 5 MG/1
5 TABLET ORAL EVERY 4 HOURS PRN
Status: DISCONTINUED | OUTPATIENT
Start: 2023-10-04 | End: 2023-10-12 | Stop reason: HOSPADM

## 2023-10-04 RX ORDER — DIPHENHYDRAMINE HYDROCHLORIDE 50 MG/ML
50 INJECTION INTRAMUSCULAR; INTRAVENOUS EVERY 4 HOURS PRN
Status: DISCONTINUED | OUTPATIENT
Start: 2023-10-04 | End: 2023-10-12 | Stop reason: HOSPADM

## 2023-10-04 RX ORDER — LORAZEPAM 2 MG/ML
2 INJECTION INTRAMUSCULAR EVERY 4 HOURS PRN
Status: DISCONTINUED | OUTPATIENT
Start: 2023-10-04 | End: 2023-10-12 | Stop reason: HOSPADM

## 2023-10-04 RX ADMIN — LORAZEPAM 2 MG: 2 INJECTION INTRAMUSCULAR; INTRAVENOUS at 01:56

## 2023-10-04 RX ADMIN — PANTOPRAZOLE SODIUM 40 MG: 40 TABLET, DELAYED RELEASE ORAL at 09:33

## 2023-10-04 RX ADMIN — FERROUS SULFATE TAB 325 MG (65 MG ELEMENTAL FE) 325 MG: 325 (65 FE) TAB at 09:33

## 2023-10-04 RX ADMIN — BUPROPION HYDROCHLORIDE 100 MG: 100 TABLET, EXTENDED RELEASE ORAL at 09:38

## 2023-10-04 RX ADMIN — HYDROXYZINE HYDROCHLORIDE 50 MG: 50 TABLET, FILM COATED ORAL at 01:21

## 2023-10-04 RX ADMIN — DIPHENHYDRAMINE HYDROCHLORIDE 50 MG: 50 INJECTION INTRAMUSCULAR; INTRAVENOUS at 01:56

## 2023-10-04 RX ADMIN — HALOPERIDOL LACTATE 5 MG: 5 INJECTION, SOLUTION INTRAMUSCULAR at 01:56

## 2023-10-04 RX ADMIN — BUPROPION HYDROCHLORIDE 100 MG: 100 TABLET, EXTENDED RELEASE ORAL at 21:38

## 2023-10-04 RX ADMIN — BUDESONIDE AND FORMOTEROL FUMARATE DIHYDRATE 2 PUFF: 80; 4.5 AEROSOL RESPIRATORY (INHALATION) at 09:32

## 2023-10-04 RX ADMIN — BUDESONIDE AND FORMOTEROL FUMARATE DIHYDRATE 2 PUFF: 80; 4.5 AEROSOL RESPIRATORY (INHALATION) at 21:37

## 2023-10-04 RX ADMIN — TRAZODONE HYDROCHLORIDE 50 MG: 50 TABLET ORAL at 01:21

## 2023-10-04 RX ADMIN — Medication: at 21:37

## 2023-10-04 RX ADMIN — Medication: at 09:16

## 2023-10-04 RX ADMIN — PROPRANOLOL HYDROCHLORIDE 10 MG: 20 TABLET ORAL at 09:33

## 2023-10-04 RX ADMIN — VORTIOXETINE 20 MG: 20 TABLET, FILM COATED ORAL at 09:33

## 2023-10-04 ASSESSMENT — PAIN SCALES - GENERAL: PAINLEVEL_OUTOF10: 0

## 2023-10-04 NOTE — BH NOTE
Patient was asked to come out to take scheduled medications. Patient came out to take medications. The prescribed Symbicort was given to patient. She states \"I want to die I don't know how to work this\" then proceeded to throw Symbicort and attachment at [employer] and then refused the rest of her medications.

## 2023-10-04 NOTE — BH NOTE
OT approached writer about patients soiled underwear, noting that pt did not want to wash it here. Brown paper bag was labeled and provided to patient to place soiled underwear within it.

## 2023-10-04 NOTE — BH NOTE
Emergency PRN Medication Administration Note:      Patient is Agitated as evidence by screaming, crying hysterically and slamming doors. Staff attempted to find and relieve the distress by Talking to patient and Administer PRN medications Patient accepted PRN medications. Medication Administered as prescribed: Ativan 2 mg IM, Haldol 5 mg IM and Benadryl 50 mg IM. Patient Tolerated medication administration. Will continue to monitor, offer support, and reassess.

## 2023-10-04 NOTE — GROUP NOTE
Group Therapy Note    Date: 10/4/2023    Group Start Time: 1100  Group End Time: 1150  Group Topic: Cognitive Skills    Clement Marie        Group Therapy Note    Attendees: 3/11     Topic: To increase social interaction, practice decision making, concentration, and communication skills . Patient did not participate in Cognitive Skills Group at 11:00, despite staff encouragement and explanation of benefits. Pt is in room and seclusive to self during group time. Q15 minute safety checks maintained for patient safety and will continue to encourage patient to attend unit programming.         Discipline Responsible: Psychoeducational Specialist      Signature:  Ritchie Smith

## 2023-10-04 NOTE — PLAN OF CARE
Problem: Self Harm/Suicidality  Goal: Will have no self-injury during hospital stay  Description: INTERVENTIONS:  1. Ensure constant observer at bedside with Q15M safety checks  2. Maintain a safe environment  3. Secure patient belongings  4. Ensure family/visitors adhere to safety recommendations  5. Ensure safety tray has been added to patient's diet order  6. Every shift and PRN: Re-assess suicidal risk via Frequent Screener    Outcome: Progressing  Note: Patient remains free from self harm and injury during shift. Problem: Pain  Goal: Verbalizes/displays adequate comfort level or baseline comfort level  Outcome: Progressing     Problem: Depression  Goal: Will be euthymic at discharge  Description: INTERVENTIONS:  1. Administer medication as ordered  2. Provide emotional support via 1:1 interaction with staff  3. Encourage involvement in milieu/groups/activities  4. Monitor for social isolation  Outcome: Not Progressing  Note: Patient was crying hysterically, slamming doors, threw medication / refused medications and stated she wants to die. Patient was given IM agitation medications. She is upset due to her family not reaching out to talk to her. She remains isolative to her room stating she is scared that she may fall on the hard ground.

## 2023-10-04 NOTE — BH NOTE
Emergency Medication Follow-Up Note:    PRN medication of  Ativan 2 mg IM, Haldol 5 mg IM and Benadryl 50 mg IM was effective as evidence by patient regaining control of emotions and resting peacefully. Patient denies medication side effects. Will continue to monitor and provide support as needed.

## 2023-10-04 NOTE — PLAN OF CARE
[facility]  Day 3 Interdisciplinary Treatment Plan NOTE    Review Date & Time: 10/4/2023   1:24 pm    Admission Type:   Admission Type:  Involuntary    Reason for admission:  Reason for Admission: depression with suicidal thoughts to overdose  Estimated Length of Stay: 5-7 days  Estimated Discharge Date Update: to be determined by physician    PATIENT STRENGTHS:  Patient Strengths    Patient Strengths and Limitations:Limitations: Difficult relationships / poor social skills, Difficulty problem solving/relies on others to help solve problems, Inappropriate/potentially harmful leisure interests, Apathetic / unmotivated  Addictive Behavior:Addictive Behavior  In the Past 3 Months, Have You Felt or Has Someone Told You That You Have a Problem With  : None  Medical Problems:  Past Medical History:   Diagnosis Date    Asthma     GERD (gastroesophageal reflux disease)     Iron deficiency anemia     Schizoaffective disorder, bipolar type ([address])        Risk:  Fall Risk   Chandler Scale Chandler Scale Score: 22  BVC    Change in scores no Changes to plan of Care no    Status EXAM:   Mental Status and Behavioral Exam  Normal: No  Level of Assistance: Independent/Self  Facial Expression: Exaggerated  Affect: Constricted  Level of Consciousness: Alert  Frequency of Checks: 4 times per hour, close  Mood:Normal: No  Mood: Depressed, Anxious  Motor Activity:Normal: No  Motor Activity: Decreased  Eye Contact: Fair  Observed Behavior: Agitated, Guarded  Sexual Misconduct History: Current - no  Preception: [place] to person, [place] to time, [place] to place, [place] to situation  Attention:Normal: No  Attention: Distractible  Thought Processes: Blocking  Thought Content:Normal: No  Thought Content: Preoccupations  Depression Symptoms: Increased irritability, Isolative, Loss of interest, Feelings of helplessness, Feelings of hopelessess, Feelings of worthlessness, Impaired concentration  Anxiety Symptoms: Generalized  Herlinda

## 2023-10-04 NOTE — GROUP NOTE
Group Therapy Note    Date: 10/4/2023    Group Start Time: 1000  Group End Time: 1030  Group Topic: Psychotherapy    [facility] KANDI Hoover        Group Therapy Note    Attendees: 4/11       Patient declined to attend psychotherapy group after encouragement from staff. 1:1 talk time offered but refused. Signature:   Jett Hoover

## 2023-10-04 NOTE — PLAN OF CARE
Problem: Self Harm/Suicidality  Goal: Will have no self-injury during hospital stay  Description: INTERVENTIONS:  1. Ensure constant observer at bedside with Q15M safety checks  2. Maintain a safe environment  3. Secure patient belongings  4. Ensure family/visitors adhere to safety recommendations  5. Ensure safety tray has been added to patient's diet order  6. Every shift and PRN: Re-assess suicidal risk via Frequent Screener    10/4/2023 1903 by Bayron Aden RN  Note: Ms. Colten Quintana endorses fleeting suicidal ideation and she was able to contract for safety. Problem: Depression  Goal: Will be euthymic at discharge  Description: INTERVENTIONS:  1. Administer medication as ordered  2. Provide emotional support via 1:1 interaction with staff  3. Encourage involvement in milieu/groups/activities  4. Monitor for social isolation  10/4/2023 1903 by Bayron Aden RN  Note: Ms. Colten Quintana is helpless and hopeless. She has childlike behaviors and requires coaxing and encouragement to complete some tasks. She consumed lunch and breakfast. She had 2 episodes of emesis late morning. She expressed she believes they are from her Iron pill.
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
## 2023-10-05 PROCEDURE — 6370000000 HC RX 637 (ALT 250 FOR IP): Performed by: INTERNAL MEDICINE

## 2023-10-05 PROCEDURE — 1240000000 HC EMOTIONAL WELLNESS R&B

## 2023-10-05 PROCEDURE — 99232 SBSQ HOSP IP/OBS MODERATE 35: CPT | Performed by: PSYCHIATRY & NEUROLOGY

## 2023-10-05 PROCEDURE — 6370000000 HC RX 637 (ALT 250 FOR IP): Performed by: PSYCHIATRY & NEUROLOGY

## 2023-10-05 PROCEDURE — APPSS30 APP SPLIT SHARED TIME 16-30 MINUTES: Performed by: NURSE PRACTITIONER

## 2023-10-05 PROCEDURE — 97530 THERAPEUTIC ACTIVITIES: CPT

## 2023-10-05 RX ORDER — PANTOPRAZOLE SODIUM 40 MG/1
40 TABLET, DELAYED RELEASE ORAL
Status: DISCONTINUED | OUTPATIENT
Start: 2023-10-05 | End: 2023-10-12 | Stop reason: HOSPADM

## 2023-10-05 RX ADMIN — BUDESONIDE AND FORMOTEROL FUMARATE DIHYDRATE 2 PUFF: 80; 4.5 AEROSOL RESPIRATORY (INHALATION) at 21:59

## 2023-10-05 RX ADMIN — PROPRANOLOL HYDROCHLORIDE 10 MG: 20 TABLET ORAL at 09:22

## 2023-10-05 RX ADMIN — PROPRANOLOL HYDROCHLORIDE 10 MG: 20 TABLET ORAL at 21:59

## 2023-10-05 RX ADMIN — Medication: at 09:24

## 2023-10-05 RX ADMIN — BUDESONIDE AND FORMOTEROL FUMARATE DIHYDRATE 2 PUFF: 80; 4.5 AEROSOL RESPIRATORY (INHALATION) at 09:21

## 2023-10-05 RX ADMIN — BUPROPION HYDROCHLORIDE 100 MG: 100 TABLET, EXTENDED RELEASE ORAL at 21:59

## 2023-10-05 RX ADMIN — Medication: at 22:00

## 2023-10-05 RX ADMIN — ONDANSETRON HYDROCHLORIDE 8 MG: 4 TABLET, FILM COATED ORAL at 16:07

## 2023-10-05 RX ADMIN — HYDROXYZINE HYDROCHLORIDE 50 MG: 50 TABLET, FILM COATED ORAL at 16:04

## 2023-10-05 RX ADMIN — PANTOPRAZOLE SODIUM 40 MG: 40 TABLET, DELAYED RELEASE ORAL at 09:22

## 2023-10-05 RX ADMIN — VORTIOXETINE 20 MG: 20 TABLET, FILM COATED ORAL at 09:22

## 2023-10-05 RX ADMIN — BUPROPION HYDROCHLORIDE 100 MG: 100 TABLET, EXTENDED RELEASE ORAL at 09:22

## 2023-10-05 NOTE — PROGRESS NOTES
[facility]   INPATIENT OCCUPATIONAL THERAPY  PROGRESS NOTE  Date: 10/5/2023  Patient Name: Clarke Villanueva       Room: 3534/3922-25  MRN: [id number]    : 1965  (59 y.o.)  Gender: female      Diagnosis: Severe recurrent major depression with psychotic features , per chart developmental delay      Discharge Recommendations:  Further Occupational Therapy is recommended upon facility discharge. OT Equipment Recommendations  Equipment Needed: Yes  Other: tub seat for walk in shower needed. TBD if grab bars for regular height toilet are needed. Restrictions/Precautions  Restrictions/Precautions  Restrictions/Precautions: Up as Tolerated; Fall Risk  Required Braces or Orthoses?: No  Implants present? : Metal implants (hx L GABRIELLA)    Vitals  BP Location: Right upper arm  O2 Device: None (Room air)    Subjective  Subjective  Subjective: pt sleeping upon arrival and is easily roused. pt requires min encouragement to participate. pt states that she lives with her elderly dad and niece and her friend. pt also states that she gets overwhelmed at times because the home is large and she has difficulty with all the housekeeping. Subjective  Pain: pt reports 6/10 in \"esophegus\", \"feels like a lump. \" pt states that nursing is aware. Objective  Orientation  Overall Orientation Status: Within Functional Limits  Orientation Level:  (knows month, year and day of week accurately.)  Cognition  Overall Cognitive Status: Exceptions  Arousal/Alertness: Appropriate responses to stimuli  Following Commands: Follows one step commands with repetition; Follows one step commands with increased time  Attention Span: Difficulty dividing attention  Memory: Decreased recall of recent events;Decreased short term memory (mild decrease)  Problem Solving: Assistance required to generate solutions;Assistance required to identify errors made;Assistance required to implement solutions;Assistance declines standing tasks this date)    Transfers/Mobility  Bed mobility  Rolling to Left: Independent  Supine to Sit: Independent  Sit to Supine: Independent  Scooting: Independent (to EOB)  Bed Mobility Comments: supine <> sit in flat twin bed           OT Exercises  A/AROM Exercises: pt engages in BUE AROM exercises in all available planes x 20 reps support daily functional tasks. pt requires extended RB between sets 2* fatigue. Patient Education  Patient Education  Education Given To: Patient  Education Provided: ADL Adaptive Strategies, Fall Prevention Strategies, Role of Therapy, Plan of Care (home safety/faall prevention)  Education Provided Comments: ed pt to sit to don pants over feet, ed to don pants over hardest LE 1st.  ed re obtain tub seat for use at home (current built in ledge is too small)  requested nsg share this recommendation with family as plan is to call family today. ed pt that currently it would not be safe for her to drive (due to easily overwhelmed and poor divided attention.)  ed pt re interact with dog seated play fetch rather than take for a walk due to fall risk concerns. Goals  Patient Goals   Patient goals : to leave here, states she does not want to stay here when she declined to do her laundry here. Short Term Goals  Time Frame for Short Term Goals: 2 weeks  Short Term Goal 1: supervision to initiate task then indep LE dressing. Short Term Goal 2: pt to be indep with toilet transfers without use of grab bars to simulate home set up or OT to recommend for pt to obtain toilet grab bars. Short Term Goal 3: pt to terrie 10 reps x 4 BUE shld, elbow AROM with mod resistance for UE strengthening  Short Term Goal 4: post stretch, pt to achieve AROM shld flex to 120. Short Term Goal 5: pt to verbalize safer way to interact with her dog than to take it for a walk. Short Term Goal 6: pt to terrie 10 min stand, ambulate indep with good safety during adls, ex.   Occupational Therapy

## 2023-10-05 NOTE — PROGRESS NOTES
Daily Progress Note  10/5/2023    Patient Name: Attila Garsia:  depression with suicidal ideation         SUBJECTIVE:      Patient is seen today for a follow up assessment. Interview conducted in patient's room with the door open, roommate not present during the encounter. Patient remains compliant with taking scheduled psychotropic medication, denies adverse effects. Reports no nausea today. Endorses persistent depressed mood and suicidal ideation, she does not share suicide plan. Moreover she endorses anxiety which she attributes to her living situation. She denies new symptoms. She denies homicidal ideation. She denies auditory, visual hallucinations or other perceptual disturbances. Per unit nursing staff patient maintains behavioral control, she spends majority of time self isolated to room. No need for emergency medication for the past 24 hours. At this time patient is unable to contract for safety outside of the hospital, she requires inpatient hospitalization for safety and stabilization.     Appetite:  [] Adequate/Unchanged  [] Increased  [x] Decreased      Sleep:       [x] Adequate/Unchanged  [] Fair  [] Poor      Group Attendance on Unit:   [] Yes   [] Selectively    [x] No    Compliant with scheduled medications: [x] Yes  [] No    Received emergency medications in past 24 hrs: [] Yes   [x] No    Medication Side Effects: Denies         Mental Status Exam  Level of consciousness: Alert and awake   Appearance: Appropriate attire for setting, resting in bed, with fair  grooming and hygiene   Behavior/Motor: Approachable, engages with interviewer, no psychomotor abnormalities   Attitude toward examiner: Cooperative, attentive, fair eye contact  Speech: Normal rate, volume, and tone  Mood:  \"depressed and anxious\"  Affect: blunted  Thought processes: perservative, childlike  Thought content: Denies homicidal ideation  Suicidal Ideation: Active suicidal ideations, contracts for conducted.   Probable discharge is TBD  Follow-up TBD    Electronically signed by Gale Swanson MD on 10/5/23 at 9:13 PM EDT

## 2023-10-05 NOTE — GROUP NOTE
Group Therapy Note    Date: 10/5/2023    Group Start Time: 3809  Group End Time: 0715  Group Topic: Cognitive Skills    [facility]    MAGDALENE Jung     Cognitive Skills Group Note        Date: October 5, 2023 Start Time:  1045 am    End Time:  1125am       Number of Participants in Group & Unit Census:  3/10    Topic: cognitive skills     Goal of Group:pt will demonstrate improved coping skills and improved social skills       Comments:     Patient did not participate in Cognitive Skills group, despite staff encouragement and explanation of benefits. Patient remain seclusive to self. Q15 minute safety checks maintained for patient safety and will continue to encourage patient to attend unit programming.               Signature:  Leigh Ann Barnes

## 2023-10-05 NOTE — GROUP NOTE
Group Therapy Note    Date: 10/5/2023    Group Start Time: 1320  Group End Time: 8863  Group Topic: Psychoeducation    [facility] KANDI Vail, CTRS    Psych-Ed/Relapse Prevention Group Note        Date: October 5, 2023 Start Time:  120pm   End Time:  145pm      Number of Participants in Group & Unit Census:  3/12    Topic: psycheducation group / neda presentation     Goal of Group:pt will improved knowledge of community resources including NEDA       Comments:     Patient did not participate in Psych-Ed/Relapse Prevention group, despite staff encouragement and explanation of benefits. Patient remain seclusive to self. Q15 minute safety checks maintained for patient safety and will continue to encourage patient to attend unit programming.               Signature:  Farnaz Elena

## 2023-10-05 NOTE — PLAN OF CARE
Problem: Depression  Goal: Will be euthymic at discharge  Description: INTERVENTIONS:  1. Administer medication as ordered  2. Provide emotional support via 1:1 interaction with staff  3. Encourage involvement in milieu/groups/activities  4. Monitor for social isolation  10/5/2023 1428 by Kelvin Mendieta RN  Outcome: Progressing     Problem: Pain  Goal: Verbalizes/displays adequate comfort level or baseline comfort level  10/5/2023 1428 by Kelvin Mendieta RN  Outcome: Progressing     Problem: Depression/Self Harm  Goal: Effect of psychiatric condition will be minimized and patient will be protected from self harm  Description: INTERVENTIONS:  1. Assess impact of patient's symptoms on level of functioning, self care needs and offer support as indicated  2. Assess patient/family knowledge of depression, impact on illness and need for teaching  3. Provide emotional support, presence and reassurance  4. Assess for possible suicidal thoughts or ideation. If patient expresses suicidal thoughts or statements do not leave alone, initiate Suicide Precautions, move to a room close to the nursing station and obtain sitter  5. Initiate consults as appropriate with Mental Health Professional, Spiritual Care, Psychosocial CNS, and consider a recommendation to the [facility]IP for a Psychiatric Consultation  10/5/2023 1428 by Kelvin Mendieta RN  Outcome: Progressing       Patient is isolative to room and out for needs only. Patient did come out of room for medications and morning goals group after encouragement. Patient cooperative with shift assessment and reports her thoughts are improving, but still feels down and depressed. Patient focused on wanting to be discharge to a nursing home. Q15 minute and random safety checks maintained.

## 2023-10-06 PROCEDURE — 6370000000 HC RX 637 (ALT 250 FOR IP): Performed by: INTERNAL MEDICINE

## 2023-10-06 PROCEDURE — APPSS30 APP SPLIT SHARED TIME 16-30 MINUTES

## 2023-10-06 PROCEDURE — 6370000000 HC RX 637 (ALT 250 FOR IP): Performed by: PSYCHIATRY & NEUROLOGY

## 2023-10-06 PROCEDURE — 99232 SBSQ HOSP IP/OBS MODERATE 35: CPT | Performed by: PSYCHIATRY & NEUROLOGY

## 2023-10-06 PROCEDURE — 1240000000 HC EMOTIONAL WELLNESS R&B

## 2023-10-06 PROCEDURE — 6370000000 HC RX 637 (ALT 250 FOR IP): Performed by: NURSE PRACTITIONER

## 2023-10-06 RX ADMIN — BUPROPION HYDROCHLORIDE 100 MG: 100 TABLET, EXTENDED RELEASE ORAL at 20:37

## 2023-10-06 RX ADMIN — VORTIOXETINE 20 MG: 20 TABLET, FILM COATED ORAL at 08:44

## 2023-10-06 RX ADMIN — HYDROXYZINE HYDROCHLORIDE 50 MG: 50 TABLET, FILM COATED ORAL at 20:37

## 2023-10-06 RX ADMIN — Medication: at 20:47

## 2023-10-06 RX ADMIN — TRAZODONE HYDROCHLORIDE 50 MG: 50 TABLET ORAL at 20:37

## 2023-10-06 RX ADMIN — PANTOPRAZOLE SODIUM 40 MG: 40 TABLET, DELAYED RELEASE ORAL at 08:44

## 2023-10-06 RX ADMIN — ONDANSETRON HYDROCHLORIDE 8 MG: 4 TABLET, FILM COATED ORAL at 17:56

## 2023-10-06 RX ADMIN — BUDESONIDE AND FORMOTEROL FUMARATE DIHYDRATE 2 PUFF: 80; 4.5 AEROSOL RESPIRATORY (INHALATION) at 08:45

## 2023-10-06 RX ADMIN — PROPRANOLOL HYDROCHLORIDE 10 MG: 20 TABLET ORAL at 20:37

## 2023-10-06 RX ADMIN — PROPRANOLOL HYDROCHLORIDE 10 MG: 20 TABLET ORAL at 08:44

## 2023-10-06 RX ADMIN — FERROUS SULFATE TAB 325 MG (65 MG ELEMENTAL FE) 325 MG: 325 (65 FE) TAB at 17:56

## 2023-10-06 RX ADMIN — BUDESONIDE AND FORMOTEROL FUMARATE DIHYDRATE 2 PUFF: 80; 4.5 AEROSOL RESPIRATORY (INHALATION) at 20:39

## 2023-10-06 RX ADMIN — BUPROPION HYDROCHLORIDE 100 MG: 100 TABLET, EXTENDED RELEASE ORAL at 08:44

## 2023-10-06 RX ADMIN — Medication: at 08:45

## 2023-10-06 ASSESSMENT — PAIN SCALES - GENERAL: PAINLEVEL_OUTOF10: 0

## 2023-10-06 NOTE — GROUP NOTE
Group Therapy Note    Date: 10/6/2023    Group Start Time: 1330  Group End Time: 8492  Group Topic: Psychoeducation    [facility] MAGDALENE Wood    Psych-Ed/Relapse Prevention Group Note        Date: October 6, 2023 Start Time: 1:30pm  End Time:  215pm      Number of Participants in Group & Unit Census:  6/16    Topic: psychoeducation group     Goal of Group: pt will demonstate improved communication skills and improved coping skills       Comments:     Patient did not participate in Psych-Ed/Relapse Prevention group, despite staff encouragement and explanation of benefits. Patient remain seclusive to self. Q15 minute safety checks maintained for patient safety and will continue to encourage patient to attend unit programming.               Signature:  Nisha Ko

## 2023-10-06 NOTE — PLAN OF CARE
Problem: Depression  Goal: Will be euthymic at discharge  Description: INTERVENTIONS:  1. Administer medication as ordered  2. Provide emotional support via 1:1 interaction with staff  3. Encourage involvement in milieu/groups/activities  4. Monitor for social isolation  Note: Ms. Darius Vance is tearful throughout the shift, she voices she is depressed at times, but does feel better. She reports she is anxious about her placement upon discharge, and does not want to go home. Problem: Pain  Goal: Verbalizes/displays adequate comfort level or baseline comfort level  Note: Ms Fidel Vance denies pain and discomfort during this shift.

## 2023-10-06 NOTE — GROUP NOTE
Group Therapy Note    Date: 10/6/2023    Group Start Time: 2113  Group End Time: 5886  Group Topic: Discharge Planning    JORDAN Lopez LPN        Group Therapy Note    Attendees: 9/16      Patient attended [id number] group and filled out safety plan for future discharge.

## 2023-10-06 NOTE — PROGRESS NOTES
Daily Progress Note  10/6/2023    Patient Name: Ronal Anders:  depression with suicidal ideation         SUBJECTIVE:      Patient is seen today for a follow up assessment. When approached, patient accepted need for privacy. Interview occurred privately in patients room. Patient states she is feeling better and endorses hospitalization has been beneficial to help her \"think harder about decisions\". Patient states initially she told staff her plan was discharged to a nursing home because of the increased stress at home. Patient explained to this writer how she talked with her family and believes everything going to be fine now and she does not want to lose freedom of being at home so she no longer wants to go to a nursing home. Patient went on to endorse significant depression and began talking about concerns for when her father passed away if she were to receive the house and his will. Patient's father is still alive and she does not know if she is receiving hospital will, patient presents with poor insight into why this is causing depression as \"7/10\" on a 1-10 scale (1 being low and 10 being high). Patient does report suicidal ideation has improved today. Patient denies auditory and visual hallucinations. Patient reports no medication side effects. Patient is maintained behavioral control on unit has been compliant with scheduled medications. She continues to present with limited insight, related to intellectual disability, however her mental health symptoms appear to be slowly improving. We will continue to work with patient to develop safe discharge plan.       Appetite:  [] Adequate/Unchanged  [x] fair [] Decreased      Sleep:       [x] Adequate/Unchanged  [] Fair  [] Poor      Group Attendance on Unit:   [] Yes   [x] Selectively    [] No    Compliant with scheduled medications: [x] Yes  [] No    Received emergency medications in past 24 hrs: [] Yes   [x] No    Medication Side (Positive cutoff 25 ng/mL)                  Cannabinoid Scrn, Ur 10/02/2023 NEGATIVE  NEGATIVE Final    Comment:       (Positive cutoff 50 ng/mL)                  Oxycodone Screen, Ur 10/02/2023 NEGATIVE  NEGATIVE Final    Comment:       (Positive cutoff 100 ng/mL)                  Fentanyl, Ur 10/02/2023 NEGATIVE  NEGATIVE Final    Comment:       (Positive cutoff  5 ng/ml)            Test Information 10/02/2023 Assay provides medical screening only. The absence of expected drug(s) and/or metabolite(s) may indicate diluted or adulterated urine, limitations of testing or timing of collection. Final    Comment: Testing for legal purposes should be confirmed by another method. To request confirmation   of test result, please call the lab within 7 days of sample submission. TSH 10/03/2023 1.54  0.30 - 5.00 uIU/mL Final         Reviewed patient's current plan of care and vital signs with nursing staff.     Labs reviewed: [x] Yes    QTc Calculation (Bazett)   Date Value Ref Range Status   09/30/2023 439 ms Final         Medications  Current Facility-Administered Medications: pantoprazole (PROTONIX) tablet 40 mg, 40 mg, Oral, QAM AC  haloperidol lactate (HALDOL) injection 5 mg, 5 mg, IntraMUSCular, Q4H PRN **AND** LORazepam (ATIVAN) injection 2 mg, 2 mg, IntraMUSCular, Q4H PRN **AND** diphenhydrAMINE (BENADRYL) injection 50 mg, 50 mg, IntraMUSCular, Q4H PRN  haloperidol (HALDOL) tablet 5 mg, 5 mg, Oral, Q4H PRN **AND** LORazepam (ATIVAN) tablet 2 mg, 2 mg, Oral, Q4H PRN  ferrous sulfate (IRON 325) tablet 325 mg, 325 mg, Oral, Dinner  ondansetron (ZOFRAN) tablet 8 mg, 8 mg, Oral, Q8H PRN  budesonide-formoterol (SYMBICORT) 80-4.5 MCG/ACT inhaler 2 puff, 2 puff, Inhalation, BID RT  acetaminophen (TYLENOL) tablet 650 mg, 650 mg, Oral, Q6H PRN  ibuprofen (ADVIL;MOTRIN) tablet 400 mg, 400 mg, Oral, Q6H PRN  hydrOXYzine HCl (ATARAX) tablet 50 mg, 50 mg, Oral, TID PRN  traZODone (DESYREL) tablet 50 mg, 50 mg, Oral,

## 2023-10-06 NOTE — GROUP NOTE
Group Therapy Note    Date: 10/6/2023    Group Start Time: 2054  Group End Time: 1130  Group Topic: Cognitive Skills    [facility]    Prerna Vail CTRS    Cognitive Skills Group Note        Date: October 6, 2023 Start Time:  1045am   End Time: 11:30am      Number of Participants in Group & Unit Census:  7/16    Topic: cognitive skills     Goal of Group: pt will demonstate improved coping skills and improved social skills       Comments:     Patient did not participate in Cognitive Skills group, despite staff encouragement and explanation of benefits. Patient remain seclusive to self. Q15 minute safety checks maintained for patient safety and will continue to encourage patient to attend unit programming.               Signature:  Farnaz Elena

## 2023-10-06 NOTE — GROUP NOTE
Group Therapy Note    Date: 10/6/2023    Group Start Time: 1000  Group End Time: 4026  Group Topic: Nursing    [facility]    Isha Allison RN        Group Therapy Note    Attendees: 8 out of 15         Patient's Goal:  pt will identify positive coping skills and healthy living lifestyle. Notes:      Status After Intervention:  Unchanged    Participation Level:  Active Listener    Participation Quality: Appropriate      Speech:  normal      Thought Process/Content: Logical      Affective Functioning: Congruent      Mood: stable      Level of consciousness:  Alert and Oriented x4      Response to Learning: Able to verbalize current knowledge/experience and Able to verbalize/acknowledge new learning      Endings: None Reported    Modes of Intervention: Problem-solving      Discipline Responsible: Registered Nurse      Signature:  Isha Allison RN

## 2023-10-07 PROCEDURE — 6370000000 HC RX 637 (ALT 250 FOR IP): Performed by: PSYCHIATRY & NEUROLOGY

## 2023-10-07 PROCEDURE — 6370000000 HC RX 637 (ALT 250 FOR IP): Performed by: INTERNAL MEDICINE

## 2023-10-07 PROCEDURE — 1240000000 HC EMOTIONAL WELLNESS R&B

## 2023-10-07 PROCEDURE — 99232 SBSQ HOSP IP/OBS MODERATE 35: CPT

## 2023-10-07 RX ADMIN — Medication: at 09:00

## 2023-10-07 RX ADMIN — HYDROXYZINE HYDROCHLORIDE 50 MG: 50 TABLET, FILM COATED ORAL at 23:13

## 2023-10-07 RX ADMIN — PROPRANOLOL HYDROCHLORIDE 10 MG: 20 TABLET ORAL at 09:00

## 2023-10-07 RX ADMIN — BUPROPION HYDROCHLORIDE 100 MG: 100 TABLET, EXTENDED RELEASE ORAL at 09:00

## 2023-10-07 RX ADMIN — PANTOPRAZOLE SODIUM 40 MG: 40 TABLET, DELAYED RELEASE ORAL at 09:00

## 2023-10-07 RX ADMIN — VORTIOXETINE 20 MG: 20 TABLET, FILM COATED ORAL at 09:00

## 2023-10-07 RX ADMIN — BUDESONIDE AND FORMOTEROL FUMARATE DIHYDRATE 2 PUFF: 80; 4.5 AEROSOL RESPIRATORY (INHALATION) at 20:44

## 2023-10-07 RX ADMIN — BUPROPION HYDROCHLORIDE 100 MG: 100 TABLET, EXTENDED RELEASE ORAL at 20:43

## 2023-10-07 RX ADMIN — BUDESONIDE AND FORMOTEROL FUMARATE DIHYDRATE 2 PUFF: 80; 4.5 AEROSOL RESPIRATORY (INHALATION) at 09:01

## 2023-10-07 RX ADMIN — TRAZODONE HYDROCHLORIDE 50 MG: 50 TABLET ORAL at 20:56

## 2023-10-07 RX ADMIN — PROPRANOLOL HYDROCHLORIDE 10 MG: 20 TABLET ORAL at 20:44

## 2023-10-07 ASSESSMENT — PAIN SCALES - GENERAL: PAINLEVEL_OUTOF10: 0

## 2023-10-07 NOTE — PROGRESS NOTES
Daily Progress Note  10/7/2023    Patient Name: Vilma Pennington: Severe recurrent major depression with psychotic features         SUBJECTIVE:      Patient is seen today for a follow up assessment. Leonel Leal was found resting in her room. She agreed to meet privately. Upon approach she brightened and was willing to answer questions. She was cooperative and answered questions to the best of her ability. She currently continues to endorse suicidal ideation and is unable to contract for safety off the unit. She denies any homicidal ideation. She currently denies any auditory or visual hallucinations. She denies any delusions but does endorse some paranoia. Patient admitted to going to 1 group but feeling \"overwhelmed. \"  Patient encouraged to continue to attend groups. Patient brought up her niece and stated that she was upset with her and the poor living situation. She does endorse bedbugs throughout the home and notes a stressful living situation. She states that her niece and her friend are not kind to her. She states her niece recently kicked her out of the home and would not talk to her on the phone. Patient is open to placement in a nursing home. When asked her thoughts and why she would be going to a nursing home she says \"so they can take care of me. \"  The patient endorses multiple fears of living through her daily life. The patient was encouraged to shower and she stated \"what if I fall. \"  When asked if she would wash up at bedside patient states \"I am afraid of the soap here. \"  The patient was encouraged to do at least 1 thing a day that made her nervous and to ask for help and support with the nurses. The patient open to washing her face and brushing her teeth the night before bed. Nurses aware of documenting and encouraging daily self-cares. The patient continues to show poor insight to her mental illness and severe anxiety.   Her anxieties are currently keeping her from IntraMUSCular, Q4H PRN  haloperidol (HALDOL) tablet 5 mg, 5 mg, Oral, Q4H PRN **AND** LORazepam (ATIVAN) tablet 2 mg, 2 mg, Oral, Q4H PRN  ferrous sulfate (IRON 325) tablet 325 mg, 325 mg, Oral, Dinner  ondansetron (ZOFRAN) tablet 8 mg, 8 mg, Oral, Q8H PRN  budesonide-formoterol (SYMBICORT) 80-4.5 MCG/ACT inhaler 2 puff, 2 puff, Inhalation, BID RT  acetaminophen (TYLENOL) tablet 650 mg, 650 mg, Oral, Q6H PRN  ibuprofen (ADVIL;MOTRIN) tablet 400 mg, 400 mg, Oral, Q6H PRN  hydrOXYzine HCl (ATARAX) tablet 50 mg, 50 mg, Oral, TID PRN  traZODone (DESYREL) tablet 50 mg, 50 mg, Oral, Nightly PRN  polyethylene glycol (GLYCOLAX) packet 17 g, 17 g, Oral, Daily PRN  aluminum & magnesium hydroxide-simethicone (MAALOX) 200-200-20 MG/5ML suspension 30 mL, 30 mL, Oral, Q6H PRN  buPROPion (WELLBUTRIN SR) extended release tablet 100 mg, 100 mg, Oral, BID  docusate sodium (COLACE) capsule 100 mg, 100 mg, Oral, Daily PRN  propranolol (INDERAL) tablet 10 mg, 10 mg, Oral, BID  VORTIoxetine HBr (TRINTELLIX) tablet 20 mg, 20 mg, Oral, Daily  Hydrocerin (EUCERIN) cream CREA, , Topical, BID    ASSESSMENT  Severe recurrent major depression with psychotic features (HCC)         PATIENT plan  Patient symptoms are: Unstable  Continue to develop insight  Monitor need and frequency of PRN medications. Encourage participation in groups and milieu. Medication changes and discharge planning per attending  Follow-up daily while inpatient. Patient continues to be monitored in the inpatient psychiatric facility at [facility] for safety and stabilization. Patient continues to need, on a daily basis, active treatment furnished directly by or requiring the supervision of inpatient psychiatric personnel. Electronically signed by NORM Larson CNP on 10/7/2023 at 5:30 PM    **This report has been created using voice recognition software. It may contain minor errors which are inherent in voice recognition technology. **

## 2023-10-07 NOTE — PLAN OF CARE
Problem: Depression  Goal: Will be euthymic at discharge  Description: INTERVENTIONS:  1. Administer medication as ordered  2. Provide emotional support via 1:1 interaction with staff  3. Encourage involvement in milieu/groups/activities  4. Monitor for social isolation  Outcome: Progressing  Note: Ms. Anthony Herrera endorses feeling depressed and hopeless about her living situation. She reports she does not want to go back, because her niece and her friend are \"mean to me\", but she also was heard on the phone asking her dad \"You're still going to let me come home aren't you? I thought I wanted to go to a nursing home, but now I don't\". Ms. Anthony Herrera has been tearful while on the phone this shift, but has remained tear free throughout the remainder of the shift. She is isolative to her room mostly, but comes out for meals and needs. Problem: Pain  Goal: Verbalizes/displays adequate comfort level or baseline comfort level  Outcome: Progressing  Note: Ms. Anthony Herrera had not demonstrated or reported pain or discomfort thus far. Problem: Depression/Self Harm  Goal: Effect of psychiatric condition will be minimized and patient will be protected from self harm  Description: INTERVENTIONS:  1. Assess impact of patient's symptoms on level of functioning, self care needs and offer support as indicated  2. Assess patient/family knowledge of depression, impact on illness and need for teaching  3. Provide emotional support, presence and reassurance  4. Assess for possible suicidal thoughts or ideation. If patient expresses suicidal thoughts or statements do not leave alone, initiate Suicide Precautions, move to a room close to the nursing station and obtain sitter  5. Initiate consults as appropriate with Mental Health Professional, Spiritual Care, Psychosocial CNS, and consider a recommendation to the [facility] for a Psychiatric Consultation  Outcome: Progressing  Note: Ms. Anthony Herrera has remained free of self harm during this shift.

## 2023-10-07 NOTE — GROUP NOTE
Group Therapy Note    Date: 10/7/2023    Group Start Time: 1330  Group End Time: 1430  Group Topic: Cognitive Skills    MAGDALENE Reynoso    Cognitive Skills Group Note        Date: October 7, 2023 Start Time: 1:30pm  End Time: 2:30pm      Number of Participants in Group & Unit Census:  7/17    Topic: cognitive skills    Goal of Group:pt will demonstrate improved coping skills and improved social skills       Comments:     Patient did not participate in Cognitive Skills group, despite staff encouragement and explanation of benefits. Patient remain seclusive to self. Q15 minute safety checks maintained for patient safety and will continue to encourage patient to attend unit programming.               Signature:  Yany Rios

## 2023-10-07 NOTE — PLAN OF CARE
Problem: Pain  Goal: Verbalizes/displays adequate comfort level or baseline comfort level  10/6/2023 2225 by Anjali Claros RN  Outcome: Progressing  Note: Patient denied pain during assessment. Problem: Depression/Self Harm  Goal: Effect of psychiatric condition will be minimized and patient will be protected from self harm  Description: INTERVENTIONS:  1. Assess impact of patient's symptoms on level of functioning, self care needs and offer support as indicated  2. Assess patient/family knowledge of depression, impact on illness and need for teaching  3. Provide emotional support, presence and reassurance  4. Assess for possible suicidal thoughts or ideation. If patient expresses suicidal thoughts or statements do not leave alone, initiate Suicide Precautions, move to a room close to the nursing station and obtain sitter  5. Initiate consults as appropriate with Mental Health Professional, Spiritual Care, Psychosocial CNS, and consider a recommendation to the [facility]IP for a Psychiatric Consultation  Outcome: Progressing  Note: Patient remains free from self harm      Problem: Depression  Goal: Will be euthymic at discharge  Description: INTERVENTIONS:  1. Administer medication as ordered  2. Provide emotional support via 1:1 interaction with staff  3. Encourage involvement in milieu/groups/activities  4. Monitor for social isolation  10/6/2023 2225 by Anjali Claros RN  Outcome: Not Progressing  Note: Patient hysterically crying / yelling in room due to family not wanting her back at their house. Patient is unstable as she is smiling and then starts crying. Staff re-directed attention and talked to her in which she calmed down. She was apologetic for yelling. She remains isolative to her room. 10/6/2023 1819 by Twanna Scheuermann, RN  Note: Ms. Tommy Ling is tearful throughout the shift, she voices she is depressed at times, but does feel better.  She reports she is anxious about her placement upon

## 2023-10-07 NOTE — GROUP NOTE
Group Therapy Note    Date: 10/7/2023    Group Start Time: 6384  Group End Time: 0930  Group Topic: Community Meeting    [facility] Allie Whitehead LPN           Refused to attend 9 AM community meeting due to resting in bed, refused 1;1 talk time      Signature:  Elizabeth Blake LPN

## 2023-10-08 PROCEDURE — 6370000000 HC RX 637 (ALT 250 FOR IP): Performed by: NURSE PRACTITIONER

## 2023-10-08 PROCEDURE — 6370000000 HC RX 637 (ALT 250 FOR IP): Performed by: PSYCHIATRY & NEUROLOGY

## 2023-10-08 PROCEDURE — 6370000000 HC RX 637 (ALT 250 FOR IP): Performed by: INTERNAL MEDICINE

## 2023-10-08 PROCEDURE — 1240000000 HC EMOTIONAL WELLNESS R&B

## 2023-10-08 PROCEDURE — 99232 SBSQ HOSP IP/OBS MODERATE 35: CPT

## 2023-10-08 RX ORDER — BUPROPION HYDROCHLORIDE 150 MG/1
150 TABLET, EXTENDED RELEASE ORAL 2 TIMES DAILY
Status: DISCONTINUED | OUTPATIENT
Start: 2023-10-08 | End: 2023-10-08

## 2023-10-08 RX ORDER — BUPROPION HYDROCHLORIDE 150 MG/1
150 TABLET, EXTENDED RELEASE ORAL DAILY
Status: DISCONTINUED | OUTPATIENT
Start: 2023-10-08 | End: 2023-10-09

## 2023-10-08 RX ADMIN — TRAZODONE HYDROCHLORIDE 50 MG: 50 TABLET ORAL at 21:44

## 2023-10-08 RX ADMIN — BUPROPION HYDROCHLORIDE 100 MG: 100 TABLET, EXTENDED RELEASE ORAL at 09:05

## 2023-10-08 RX ADMIN — FERROUS SULFATE TAB 325 MG (65 MG ELEMENTAL FE) 325 MG: 325 (65 FE) TAB at 17:32

## 2023-10-08 RX ADMIN — BUDESONIDE AND FORMOTEROL FUMARATE DIHYDRATE 2 PUFF: 80; 4.5 AEROSOL RESPIRATORY (INHALATION) at 21:44

## 2023-10-08 RX ADMIN — BUDESONIDE AND FORMOTEROL FUMARATE DIHYDRATE 2 PUFF: 80; 4.5 AEROSOL RESPIRATORY (INHALATION) at 09:06

## 2023-10-08 RX ADMIN — PANTOPRAZOLE SODIUM 40 MG: 40 TABLET, DELAYED RELEASE ORAL at 09:05

## 2023-10-08 RX ADMIN — PROPRANOLOL HYDROCHLORIDE 10 MG: 20 TABLET ORAL at 09:05

## 2023-10-08 RX ADMIN — VORTIOXETINE 20 MG: 20 TABLET, FILM COATED ORAL at 09:06

## 2023-10-08 RX ADMIN — Medication: at 21:44

## 2023-10-08 RX ADMIN — Medication: at 09:06

## 2023-10-08 RX ADMIN — PROPRANOLOL HYDROCHLORIDE 10 MG: 20 TABLET ORAL at 21:44

## 2023-10-08 NOTE — GROUP NOTE
Group Therapy Note    Date: 10/8/2023    Group Start Time: 1030  Group End Time: 1115  Group Topic: Cognitive Skills    JORDAN BHMAGDALENE Blackman    Cognitive Skills Group Note        Date: October 8, 2023 Start Time:  1030am   End Time:  1115am      Number of Participants in Group & Unit Census:  7/15    Topic: cognitive skills     Goal of Group: pt will demonstrate improved concentration and improved coping skills       Comments:     Patient did not participate in Cognitive Skills group, despite staff encouragement and explanation of benefits. Patient remain seclusive to self. Q15 minute safety checks maintained for patient safety and will continue to encourage patient to attend unit programming.               Signature:  Danya Leonard

## 2023-10-08 NOTE — PLAN OF CARE
Problem: Depression  Goal: Will be euthymic at discharge  Description: INTERVENTIONS:  1. Administer medication as ordered  2. Provide emotional support via 1:1 interaction with staff  3. Encourage involvement in milieu/groups/activities  4. Monitor for social isolation  10/8/2023 1035 by Tyler Porras LPN  Outcome: Progressing  10/7/2023 2210 by Sammy Su RN  Outcome: Progressing     Problem: Pain  Goal: Verbalizes/displays adequate comfort level or baseline comfort level  10/8/2023 1035 by Tyler Porras LPN  Outcome: Progressing  10/7/2023 2210 by Sammy Su RN  Outcome: Progressing     Problem: Depression/Self Harm  Goal: Effect of psychiatric condition will be minimized and patient will be protected from self harm  Description: INTERVENTIONS:  1. Assess impact of patient's symptoms on level of functioning, self care needs and offer support as indicated  2. Assess patient/family knowledge of depression, impact on illness and need for teaching  3. Provide emotional support, presence and reassurance  4. Assess for possible suicidal thoughts or ideation. If patient expresses suicidal thoughts or statements do not leave alone, initiate Suicide Precautions, move to a room close to the nursing station and obtain sitter  5. Initiate consults as appropriate with Mental Health Professional, Spiritual Care, Psychosocial CNS, and consider a recommendation to the [facility]IP for a Psychiatric Consultation  10/8/2023 1035 by Tyler Porras LPN  Outcome: Progressing  10/7/2023 2210 by Sammy Su RN  Outcome: Progressing     Patient expresses she is depressed and anxious but denies suicidal ideation. Patient agrees to notify staff if she is having any thoughts to harm self. Patient denies pain at this time. Patients goal today is to come out of room , social with her peers more and find more effective coping mechanisms.  Patient is attending groups, social at times, child like, difficulty concentrating at times,

## 2023-10-08 NOTE — PLAN OF CARE
Problem: Depression  Goal: Will be euthymic at discharge  Description: INTERVENTIONS:  1. Administer medication as ordered  2. Provide emotional support via 1:1 interaction with staff  3. Encourage involvement in milieu/groups/activities  4. Monitor for social isolation  10/7/2023 2210 by Conor Eric RN  Outcome: Progressing     Problem: Pain  Goal: Verbalizes/displays adequate comfort level or baseline comfort level  10/7/2023 2210 by Codie Matson RN  Outcome: Progressing     Problem: Depression/Self Harm  Goal: Effect of psychiatric condition will be minimized and patient will be protected from self harm  Description: INTERVENTIONS:  1. Assess impact of patient's symptoms on level of functioning, self care needs and offer support as indicated  2. Assess patient/family knowledge of depression, impact on illness and need for teaching  3. Provide emotional support, presence and reassurance  4. Assess for possible suicidal thoughts or ideation. If patient expresses suicidal thoughts or statements do not leave alone, initiate Suicide Precautions, move to a room close to the nursing station and obtain sitter  5. Initiate consults as appropriate with Mental Health Professional, Spiritual Care, Psychosocial CNS, and consider a recommendation to the [facility]IP for a Psychiatric Consultation  10/7/2023 2210 by Conor Eric RN  Outcome: Progressing     Patient remains isolative throughout shift. Denies suicidal/homicidal ideations, denies hallucinations, reports ongoing anxiety and depression d/t conflict with niece. Reports fair appetite and broken sleep d/t temperature in room being high. Medication compliant, requested prn sleep aid. Encouraged to maintain room in order. Will continue to monitor for safety and changes in mental status while admitted.

## 2023-10-08 NOTE — PROGRESS NOTES
Daily Progress Note  10/8/2023    Patient Name: Sugey Fernandes: Severe recurrent major depression with psychotic features         SUBJECTIVE:      Patient is seen today for a follow up assessment. She continues to endorse severe anxiety and was unable to obtain the goal of washing her face and brushing her teeth last night. She continues to endorse fear of the soap and falling. Nursing staff aware and encouraging patient to increase her self-care behaviors. Patient does endorse improvement in suicidal ideation. She denies any current HI, paranoia, delusions or AVH. She endorses improvement in appetite and is able to eat out in the milieu with other patients. She does endorse attending groups today. I set an goal to come out of her room and socialize with peers more. She appears pleasant and cooperative with staff and other patients. Patient is compliant with scheduled medications and denies any side effects. She is taking hydroxyzine frequently for anxiety in the evening. She denies any concerns with sleep. Patient continues to show severe anxiety that debilitates her taking individual action in her self-care and hygiene practices. Patient will continue to be encouraged to challenge herself and to replace negative thoughts with positive thoughts. Plan to decrease Wellbutrin to from 200 to 150 mg daily. Plan to monitor for improvement in anxiety.     Appetite:  [x] Adequate/Unchanged  [] Increased  [] Decreased      Sleep:       [x] Adequate/Unchanged  [] Fair  [] Poor      Group Attendance on Unit:   [] Yes   [x] Selectively    [] No    Compliant with scheduled medications: [x] Yes  [] No    Received emergency medications in past 24 hrs: [] Yes   [x] No    Medication Side Effects: Denies         Mental Status Exam  Level of consciousness: Alert and awake   Appearance: Appropriate attire for setting, resting in bed, with poor grooming and hygiene   Behavior/Motor: Approachable,

## 2023-10-08 NOTE — GROUP NOTE
Group Therapy Note    Date: 10/8/2023    Group Start Time: 0900  Group End Time: 1073  Group Topic: Community Meeting    [facility]    Jeremy Orozco LPN        Group Therapy Note    Attendees:8/15         Patient's Goal: Come out of room and socialize with peers more. Status After Intervention:  Unchanged    Participation Level:  Active Listener    Participation Quality: Appropriate      Speech:  normal      Thought Process/Content: Flight of ideas      Affective Functioning: Flat      Mood: anxious      Level of consciousness:  Preoccupied      Response to Learning: Able to retain information      Endings: None Reported    Modes of Intervention: Support      Discipline Responsible: Licensed Practical Nurse      Signature:  Jeremy Orozco LPN

## 2023-10-09 PROCEDURE — 1240000000 HC EMOTIONAL WELLNESS R&B

## 2023-10-09 PROCEDURE — 6370000000 HC RX 637 (ALT 250 FOR IP)

## 2023-10-09 PROCEDURE — 6370000000 HC RX 637 (ALT 250 FOR IP): Performed by: PSYCHIATRY & NEUROLOGY

## 2023-10-09 PROCEDURE — 6370000000 HC RX 637 (ALT 250 FOR IP): Performed by: INTERNAL MEDICINE

## 2023-10-09 PROCEDURE — 99232 SBSQ HOSP IP/OBS MODERATE 35: CPT | Performed by: PSYCHIATRY & NEUROLOGY

## 2023-10-09 PROCEDURE — APPSS30 APP SPLIT SHARED TIME 16-30 MINUTES

## 2023-10-09 RX ORDER — BUPROPION HYDROCHLORIDE 150 MG/1
150 TABLET ORAL DAILY
Status: DISCONTINUED | OUTPATIENT
Start: 2023-10-10 | End: 2023-10-12 | Stop reason: HOSPADM

## 2023-10-09 RX ADMIN — PANTOPRAZOLE SODIUM 40 MG: 40 TABLET, DELAYED RELEASE ORAL at 08:24

## 2023-10-09 RX ADMIN — BUPROPION HYDROCHLORIDE 150 MG: 150 TABLET, FILM COATED, EXTENDED RELEASE ORAL at 08:24

## 2023-10-09 RX ADMIN — PROPRANOLOL HYDROCHLORIDE 10 MG: 20 TABLET ORAL at 22:01

## 2023-10-09 RX ADMIN — BUDESONIDE AND FORMOTEROL FUMARATE DIHYDRATE 2 PUFF: 80; 4.5 AEROSOL RESPIRATORY (INHALATION) at 08:31

## 2023-10-09 RX ADMIN — Medication: at 22:01

## 2023-10-09 RX ADMIN — VORTIOXETINE 20 MG: 20 TABLET, FILM COATED ORAL at 08:24

## 2023-10-09 RX ADMIN — HALOPERIDOL 5 MG: 5 TABLET ORAL at 15:57

## 2023-10-09 RX ADMIN — TRAZODONE HYDROCHLORIDE 50 MG: 50 TABLET ORAL at 22:01

## 2023-10-09 RX ADMIN — LORAZEPAM 2 MG: 1 TABLET ORAL at 15:57

## 2023-10-09 RX ADMIN — BUDESONIDE AND FORMOTEROL FUMARATE DIHYDRATE 2 PUFF: 80; 4.5 AEROSOL RESPIRATORY (INHALATION) at 22:02

## 2023-10-09 NOTE — GROUP NOTE
Group Therapy Note    Date: 10/9/2023    Group Start Time: 0900  Group End Time: 0930  Group Topic: Community Meeting        Kami Dewey RN    Community Meeting Group Note        Date: October 9, 2023 Start Time: 9am  End Time: 930am      Number of Participants in Group & Unit Census:  8/19    Topic: Goals group    Goal of Group:Patient will identify 1-2 short term goals they would like to accomplish by the end of the day. Comments:     Patient did not participate in [employer] group, despite staff encouragement and explanation of benefits. Patient remain seclusive to self. Q15 minute safety checks maintained for patient safety and will continue to encourage patient to attend unit programming.

## 2023-10-09 NOTE — PLAN OF CARE
UPDATE:  Time frame for Short-Term Goals:  8-14days     LONG-TERM GOALS UPDATE:  Time frame for Long-Term Goals:  6 months    Members Present in Team Meeting:   See signature sheet   Rylee Finney RN

## 2023-10-09 NOTE — GROUP NOTE
Group Therapy Note    Date: 10/9/2023    Group Start Time: 1105  Group End Time: 9075  Group Topic: Cognitive Skills    Clement Timmons        Group Therapy Note    Attendees: 7/19     Topic: To increase social interaction, practice concentration, decision making , and communication skills . Patient did not participate in Cognitive Skills Group at 11:05, despite staff encouragement and explanation of benefits. Patient remains seclusive to self      Q15 minute safety checks maintained for patient safety and will continue to encourage patient to attend unit programming.         Discipline Responsible: Psychoeducational Specialist      Signature:  Gale Arriola

## 2023-10-09 NOTE — BH NOTE
Emergency Medication Follow-Up Note:    PRN medication of Haldol 5mg tablet and Ativan 2mg tablet orally was effective as evidence by patient regaining behavior control and quietly resting in bed with eyes closed. Patient denies medication side effects. Will continue to monitor and provide support as needed.

## 2023-10-09 NOTE — BH NOTE
Emergency PRN Medication Administration Note:      Patient is Agitated and Disruptive as evidence by yelling at staff, unable to redirect to new subject despite multiple staff interactions. Staff attempted to find and relieve the distress by Talking to patient, Refocusing on new activity, Offering suggestions, and Administer PRN medications Patient is currently  continuing to escalate and accepting of PRN medication  Medication Administered as prescribed: Haldol 5mg tablet and Ativan 2mg tablet orally. Patient Tolerated medication administration. Will continue to monitor, offer support, and reassess.

## 2023-10-09 NOTE — PROGRESS NOTES
Daily Progress Note  10/9/2023    Patient Name: Arnie Rojas: Severe recurrent major depression with psychotic features         SUBJECTIVE:      Patient is seen today for a follow up assessment. Johann Ang was found resting in her room and agreed to meet privately with this provider. Upon presentation the patient stated she had recently vomited. It is noted that there was a basin of emesis next to bedside. Patient stated she became nauseous while eating and felt like her food did not settle well. She continues to endorse slight nausea. We discussed starting Zofran as needed for nausea. The patient is agreeable. We discussed sipping fluids through the day to see if her nausea improves. We discussed avoiding any heavy meals such as fried foods and sticking to light foods at this time. Patient states she understands. Patient knows to speak with nurses if continual nausea progresses or she develops any other GI disturbances or fatigue. Patient does endorse continual anxiety and is still unable to show improvement in self-care behaviors. Patient denied being able to wash her face or pressure teeth last night. She is still not taking any showers since admission. She has not attended any groups today. She is pleasant and cooperative during the interview. Patient continually being encouraged to exhibit self-care and hygiene. The patient endorses improved sleep but poor appetite. She denies any side effects to her medications. We did discuss patient's Wellbutrin history in which she states she has been on it for a couple years. She does note increased anxiety since starting the Wellbutrin. We discussed decreasing to 150 mg in which  agrees. We discussed history of BuSpar use for anxiety and patient states it was not effective. Dr. Pineda Spry to make any medication decisions and decide when it is appropriate for discharge.     Appetite:  [x] Adequate/Unchanged  []

## 2023-10-09 NOTE — GROUP NOTE
Group Therapy Note    Date: 10/9/2023    Group Start Time: 5024  Group End Time: 3656  Group Topic: Cognitive Skills    Jadiel Conde        Group Therapy Note    Attendees: 6/18     Topic: To increase social interaction, explore stress management skills, resources, mindfulness, and communication skills through creative expression and discussion. .      Patient did not participate in Cognitive Skills Group at 14:30, despite staff encouragement and explanation of benefits. Patient remains seclusive to self      Q15 minute safety checks maintained for patient safety and will continue to encourage patient to attend unit programming.         Discipline Responsible: Psychoeducational Specialist      Signature:  Jarad Colin

## 2023-10-09 NOTE — GROUP NOTE
Group Therapy Note    Date: 10/9/2023    Group Start Time: 1000  Group End Time: 3162  Group Topic: Recreational    JORDAN Johnson RN    Recreation Group Note        Date: October 9, 2023 Start Time: 10am  End Time:  1030am      Number of Participants in Group & Unit Census:  6/19    Topic: [employer] Game    Goal of Group: Work on interpersonal communication skills. Comments:     Patient did not participate in Recreation group, despite staff encouragement and explanation of benefits. Patient remain seclusive to self. Q15 minute safety checks maintained for patient safety and will continue to encourage patient to attend unit programming.

## 2023-10-09 NOTE — PLAN OF CARE
Problem: Depression  Goal: Will be euthymic at discharge  Description: INTERVENTIONS:  1. Administer medication as ordered  2. Provide emotional support via 1:1 interaction with staff  3. Encourage involvement in milieu/groups/activities  4. Monitor for social isolation  10/9/2023 1129 by Jose Pollack RN  Outcome: Progressing     Problem: Depression/Self Harm  Goal: Effect of psychiatric condition will be minimized and patient will be protected from self harm  Description: INTERVENTIONS:  1. Assess impact of patient's symptoms on level of functioning, self care needs and offer support as indicated  2. Assess patient/family knowledge of depression, impact on illness and need for teaching  3. Provide emotional support, presence and reassurance  4. Assess for possible suicidal thoughts or ideation. If patient expresses suicidal thoughts or statements do not leave alone, initiate Suicide Precautions, move to a room close to the nursing station and obtain sitter  5. Initiate consults as appropriate with Mental Health Professional, Spiritual Care, Psychosocial CNS, and consider a recommendation to the [facility]IP for a Psychiatric Consultation  10/9/2023 1129 by Jose Pollack RN  Outcome: Progressing    Patient endorses feeling depressed and anxious but denies having thoughts of self harm. Patient is worried about placement upon discharge. Patient accepting of 1:1 support talk. Q15 minute safety checks maintained. Patient remains safe at this time.

## 2023-10-10 PROCEDURE — APPSS30 APP SPLIT SHARED TIME 16-30 MINUTES

## 2023-10-10 PROCEDURE — 6370000000 HC RX 637 (ALT 250 FOR IP): Performed by: PSYCHIATRY & NEUROLOGY

## 2023-10-10 PROCEDURE — 6370000000 HC RX 637 (ALT 250 FOR IP)

## 2023-10-10 PROCEDURE — 6370000000 HC RX 637 (ALT 250 FOR IP): Performed by: INTERNAL MEDICINE

## 2023-10-10 PROCEDURE — 1240000000 HC EMOTIONAL WELLNESS R&B

## 2023-10-10 PROCEDURE — 99232 SBSQ HOSP IP/OBS MODERATE 35: CPT | Performed by: PSYCHIATRY & NEUROLOGY

## 2023-10-10 RX ADMIN — PANTOPRAZOLE SODIUM 40 MG: 40 TABLET, DELAYED RELEASE ORAL at 08:53

## 2023-10-10 RX ADMIN — TRAZODONE HYDROCHLORIDE 50 MG: 50 TABLET ORAL at 20:48

## 2023-10-10 RX ADMIN — VORTIOXETINE 20 MG: 20 TABLET, FILM COATED ORAL at 08:53

## 2023-10-10 RX ADMIN — BUDESONIDE AND FORMOTEROL FUMARATE DIHYDRATE 2 PUFF: 80; 4.5 AEROSOL RESPIRATORY (INHALATION) at 20:48

## 2023-10-10 RX ADMIN — PROPRANOLOL HYDROCHLORIDE 10 MG: 20 TABLET ORAL at 08:53

## 2023-10-10 RX ADMIN — BUPROPION HYDROCHLORIDE 150 MG: 150 TABLET, EXTENDED RELEASE ORAL at 08:53

## 2023-10-10 RX ADMIN — BUDESONIDE AND FORMOTEROL FUMARATE DIHYDRATE 2 PUFF: 80; 4.5 AEROSOL RESPIRATORY (INHALATION) at 08:53

## 2023-10-10 RX ADMIN — HYDROXYZINE HYDROCHLORIDE 50 MG: 50 TABLET, FILM COATED ORAL at 20:48

## 2023-10-10 NOTE — GROUP NOTE
Group Therapy Note    Date: 10/10/2023    Group Start Time: 1430  Group End Time: 9631  Group Topic: Cognitive Skills    MAGDALENE Mendosa        Group Therapy Note    Attendees: 6/16     Patient's Goal: To increase social interaction, practice decision making, and communication skills . Notes: Pt attended and fully participated in group. Pt was able to practice decision making, and communication skills but required assistance in learning steps of task and keeping track of turn taking. Status After Intervention:  Improved     Participation Level: Active Listener,  sharing, accepting of assistance as needed     Participation Quality:  Attentive, sharing, cooperative        Speech:  Some thought blocking but able to express needs and verbalized appreciation of support from peers and RT. Pt was more spontaneous and demonstrated interest and brightened when talking 1:1 with RT about RT's nationality. Thought Process/Content: Logical but thought blocking, pt required prompts and cues in earning task but showed some improvement in remembering simple rules, pt did verbalize when she was unsure of next step/decision in task. Affective Functioning: Blunted, brightened     Mood: Pt demonstrated improvement in mood, brightening with conversation and support from peers and RT. Pt expressed \"It's exciting meeting and talking with people from other countries\".      Level of consciousness:  Alert, and Attentive        Response to Learning:  Able to verbalize some current knowledge, able to acknowledge/verbalize new learning with repetition and assistance as needed in task-pt did ask questions when unsure and to clarify choices/steps, and Progressing to goal        Endings: None Reported     Modes of Intervention: Education, Support, Socialization, Exploration, Clarifying and Problem-solving        Discipline Responsible:

## 2023-10-10 NOTE — GROUP NOTE
Group Therapy Note    Date: 10/10/2023    Group Start Time: 1100  Group End Time: 9581  Group Topic: Cognitive Skills    Jadiel Rodarte        Group Therapy Note    Attendees: 6/17     Topic: To increase social interaction, practice concentration, following multi step directions, and communication skills . Patient did not participate in Cognitive Skills Group at 11:00, despite staff encouragement and explanation of benefits. Patient remains seclusive to self      Q15 minute safety checks maintained for patient safety and will continue to encourage patient to attend unit programming.         Discipline Responsible: Psychoeducational Specialist      Signature:  Be Taylor

## 2023-10-10 NOTE — GROUP NOTE
Group Therapy Note    Date: 10/10/2023    Group Start Time: 1000  Group End Time: 1030  Group Topic: Psychotherapy    [facility]    Calli Yoon        Group Therapy Note    Attendees: 7/19     Patient declined to attend psychotherapy group after encouragement from staff. 1:1 talk time offered but refused. Signature:   Calli Yoon

## 2023-10-10 NOTE — PLAN OF CARE
Problem: Depression  Goal: Will be euthymic at discharge  Description: INTERVENTIONS:  1. Administer medication as ordered  2. Provide emotional support via 1:1 interaction with staff  3. Encourage involvement in milieu/groups/activities  4. Monitor for social isolation  Outcome: Progressing  Note: Patient up in day area at meal times, and spends time in day area watching television. Patient attended to ADLs. Instructed and encouraged to participate in programming. Instructed to communicate thoughts and concerns in regards to treatment. Problem: Depression/Self Harm  Goal: Effect of psychiatric condition will be minimized and patient will be protected from self harm  Description: INTERVENTIONS:  1. Assess impact of patient's symptoms on level of functioning, self care needs and offer support as indicated  2. Assess patient/family knowledge of depression, impact on illness and need for teaching  3. Provide emotional support, presence and reassurance  4. Assess for possible suicidal thoughts or ideation. If patient expresses suicidal thoughts or statements do not leave alone, initiate Suicide Precautions, move to a room close to the nursing station and obtain sitter  5. Initiate consults as appropriate with Mental Health Professional, Spiritual Care, Psychosocial CNS, and consider a recommendation to the [facility]IP for a Psychiatric Consultation  Note: No negative behavior noted. Patient denies thoughts of self harm, no thoughts of harming others. Patient reports minimal anxiety, and frustration with family. Patient able to maintain control. Visual checks maintained.

## 2023-10-10 NOTE — PROGRESS NOTES
Behavioral Services                                              Medicare Re-Certification    I certify that the inpatient psychiatric hospital services furnished since the previous certification/re-certification were, and continue to be, medically necessary for;    [] (1) Treatment which could reasonably be expected to improve the patient's condition,    [x] (2) Or for diagnostic study. Estimated length of stay/service 3-5 days    Plan for post-hospital care [facility]    This patient continues to need, on a daily basis, active treatment furnished directly by or requiring the supervision of inpatient psychiatric personnel.     Electronically signed by Pablito Ibanez MD on 10/9/2023 at 9:55 PM

## 2023-10-10 NOTE — PROGRESS NOTES
Daily Progress Note  10/10/2023    Patient Name: Shivam Grove: Severe recurrent major depressive disorder with psychotic features         SUBJECTIVE:      Patient is seen today for a follow up assessment. [facility] was once again found resting in her bed upon approach. She agreed to meet privately. Patient continues to show poor insight to her illness. She is still not showing self-cares and hygiene. She continues to be afraid to bathe or wash up in the sink. She was able to brush her teeth in the afternoon after vomiting but did not proceed to brush her teeth at nighttime. She denies any current AVH, HI, or delusions. She continues to endorse improvement in SI. Patient is fixated on being placed in assisted living. When discussing returning to home the patient becomes very anxious. She denies any changes to appetite and has not been attending group since 3 days ago. She currently is compliant with scheduled medication and denies any side effects. We discussed patient's behavioral episode yesterday and what caused her to feel upset. The patient states she \"was frustrated\" when the psychiatrist asked her about where she was going and whether or not she would be able to be placed in an assisted living. Patient was accepting of p.o. Haldol and was able to calm down. Patient given supportive psychotherapy. Patient continues to be encouraged to exhibit self-cares and appropriate behaviors. Social work is working on possible placement for the patient outside of her usual home.  to make medication decisions.     Appetite:  [x] Adequate/Unchanged  [] Increased  [] Decreased      Sleep:       [x] Adequate/Unchanged  [] Fair  [] Poor      Group Attendance on Unit:   [] Yes   [] Selectively    [x] No    Compliant with scheduled medications: [x] Yes  [] No    Received emergency medications in past 24 hrs: [] Yes   [x] No    Medication Side Effects: Denies         Mental PRN  polyethylene glycol (GLYCOLAX) packet 17 g, 17 g, Oral, Daily PRN  aluminum & magnesium hydroxide-simethicone (MAALOX) 200-200-20 MG/5ML suspension 30 mL, 30 mL, Oral, Q6H PRN  docusate sodium (COLACE) capsule 100 mg, 100 mg, Oral, Daily PRN  propranolol (INDERAL) tablet 10 mg, 10 mg, Oral, BID  VORTIoxetine HBr (TRINTELLIX) tablet 20 mg, 20 mg, Oral, Daily  Hydrocerin (EUCERIN) cream CREA, , Topical, BID    ASSESSMENT  Severe recurrent major depression with psychotic features ([address])         PATIENT HANDOFF  Patient symptoms are: Unstable. Patient had to receive as needed meds yesterday due to outburst.  Monitor need and frequency of PRN medications. Encourage participation in groups and milieu. Medication changes and discharge planning per attending  Follow-up daily while inpatient. Patient continues to be monitored in the inpatient psychiatric facility at [facility] for safety and stabilization. Patient continues to need, on a daily basis, active treatment furnished directly by or requiring the supervision of inpatient psychiatric personnel. Electronically signed by NORM Miramontes CNP on 10/10/2023 at 4:07 PM    **This report has been created using voice recognition software. It may contain minor errors which are inherent in voice recognition technology. **                                          Psychiatry Attending Attestation     I independently saw and evaluated the patient. I reviewed the Advance Practice Provider's documentation above. Any additional comments or changes to the Advance Practice Provider's documentation are stated below otherwise agree with assessment. Patient continues to be largely isolated to the room. Reports her depression is little better however states her suicidal thoughts are still there.   Unable to contract for safety outside hospital.  Mentions that she had a good conversation with her niece which was last part of her stressor that led to her

## 2023-10-11 PROCEDURE — 6370000000 HC RX 637 (ALT 250 FOR IP)

## 2023-10-11 PROCEDURE — 6370000000 HC RX 637 (ALT 250 FOR IP): Performed by: INTERNAL MEDICINE

## 2023-10-11 PROCEDURE — 1240000000 HC EMOTIONAL WELLNESS R&B

## 2023-10-11 PROCEDURE — 6370000000 HC RX 637 (ALT 250 FOR IP): Performed by: PSYCHIATRY & NEUROLOGY

## 2023-10-11 PROCEDURE — APPSS30 APP SPLIT SHARED TIME 16-30 MINUTES: Performed by: NURSE PRACTITIONER

## 2023-10-11 PROCEDURE — 99232 SBSQ HOSP IP/OBS MODERATE 35: CPT | Performed by: PSYCHIATRY & NEUROLOGY

## 2023-10-11 RX ADMIN — VORTIOXETINE 20 MG: 20 TABLET, FILM COATED ORAL at 08:28

## 2023-10-11 RX ADMIN — PROPRANOLOL HYDROCHLORIDE 10 MG: 20 TABLET ORAL at 08:28

## 2023-10-11 RX ADMIN — ALUMINUM HYDROXIDE, MAGNESIUM HYDROXIDE, AND SIMETHICONE 30 ML: 200; 200; 20 SUSPENSION ORAL at 12:39

## 2023-10-11 RX ADMIN — PROPRANOLOL HYDROCHLORIDE 10 MG: 20 TABLET ORAL at 21:31

## 2023-10-11 RX ADMIN — BUDESONIDE AND FORMOTEROL FUMARATE DIHYDRATE 2 PUFF: 80; 4.5 AEROSOL RESPIRATORY (INHALATION) at 08:28

## 2023-10-11 RX ADMIN — BUDESONIDE AND FORMOTEROL FUMARATE DIHYDRATE 2 PUFF: 80; 4.5 AEROSOL RESPIRATORY (INHALATION) at 21:31

## 2023-10-11 RX ADMIN — HYDROXYZINE HYDROCHLORIDE 50 MG: 50 TABLET, FILM COATED ORAL at 21:32

## 2023-10-11 RX ADMIN — TRAZODONE HYDROCHLORIDE 50 MG: 50 TABLET ORAL at 21:32

## 2023-10-11 RX ADMIN — PANTOPRAZOLE SODIUM 40 MG: 40 TABLET, DELAYED RELEASE ORAL at 08:28

## 2023-10-11 RX ADMIN — BUPROPION HYDROCHLORIDE 150 MG: 150 TABLET, EXTENDED RELEASE ORAL at 08:28

## 2023-10-11 ASSESSMENT — PAIN SCALES - GENERAL: PAINLEVEL_OUTOF10: 0

## 2023-10-11 NOTE — PROGRESS NOTES
result, please call the lab within 7 days of sample submission. TSH 10/03/2023 1.54  0.30 - 5.00 uIU/mL Final         Reviewed patient's current plan of care and vital signs with nursing staff. Labs reviewed: [x] Yes    Medications  Current Facility-Administered Medications: buPROPion (WELLBUTRIN XL) extended release tablet 150 mg, 150 mg, Oral, Daily  pantoprazole (PROTONIX) tablet 40 mg, 40 mg, Oral, QAM AC  haloperidol lactate (HALDOL) injection 5 mg, 5 mg, IntraMUSCular, Q4H PRN **AND** LORazepam (ATIVAN) injection 2 mg, 2 mg, IntraMUSCular, Q4H PRN **AND** diphenhydrAMINE (BENADRYL) injection 50 mg, 50 mg, IntraMUSCular, Q4H PRN  haloperidol (HALDOL) tablet 5 mg, 5 mg, Oral, Q4H PRN **AND** LORazepam (ATIVAN) tablet 2 mg, 2 mg, Oral, Q4H PRN  ferrous sulfate (IRON 325) tablet 325 mg, 325 mg, Oral, Dinner  ondansetron (ZOFRAN) tablet 8 mg, 8 mg, Oral, Q8H PRN  budesonide-formoterol (SYMBICORT) 80-4.5 MCG/ACT inhaler 2 puff, 2 puff, Inhalation, BID RT  acetaminophen (TYLENOL) tablet 650 mg, 650 mg, Oral, Q6H PRN  ibuprofen (ADVIL;MOTRIN) tablet 400 mg, 400 mg, Oral, Q6H PRN  hydrOXYzine HCl (ATARAX) tablet 50 mg, 50 mg, Oral, TID PRN  traZODone (DESYREL) tablet 50 mg, 50 mg, Oral, Nightly PRN  polyethylene glycol (GLYCOLAX) packet 17 g, 17 g, Oral, Daily PRN  aluminum & magnesium hydroxide-simethicone (MAALOX) 200-200-20 MG/5ML suspension 30 mL, 30 mL, Oral, Q6H PRN  docusate sodium (COLACE) capsule 100 mg, 100 mg, Oral, Daily PRN  propranolol (INDERAL) tablet 10 mg, 10 mg, Oral, BID  VORTIoxetine HBr (TRINTELLIX) tablet 20 mg, 20 mg, Oral, Daily  Hydrocerin (EUCERIN) cream CREA, , Topical, BID    ASSESSMENT  Severe recurrent major depression with psychotic features ([address])         PLAN  Patient symptoms remain unstable. Monitor need and frequency of PRN medications. Encourage participation in groups and milieu. Attempt to develop insight. Psycho-education conducted.   Medication changes and discharge MD Enio on 10/11/23 at 7:51 PM EDT

## 2023-10-11 NOTE — PLAN OF CARE
Problem: Depression/Self Harm  Goal: Effect of psychiatric condition will be minimized and patient will be protected from self harm  Description: INTERVENTIONS:  Problem: Depression  Goal: Will be euthymic at discharge  Description: INTERVENTIONS:  10/10/2023 2337 by Earl North LPN  Outcome: Progressing     10/10/2023 2337 by Earl North LPN  Outcome: Progressing   Patient denies suicidal ideas at this time. Patient denies homicidal ideas at this time. Patient denies depressive symptoms at this time. Patient has been in room. Patient is free of self harm at this time. Patient agrees to seek out staff if thoughts to harm self arise. Staff will provide support and reassurance as needed. Safety checks maintained every 15 minutes.

## 2023-10-11 NOTE — GROUP NOTE
Group Therapy Note    Date: 10/11/2023    Group Start Time: 1100  Group End Time: 7554  Group Topic: Cognitive Skills    [facility]    Jadiel Dunn        Group Therapy Note    Attendees: 11/20     Patient's Goal: To increase social interaction, practice decision making, explore perception, and communication skills           Notes: Pt was able to practice decision making, explore perception, and communication skills . pt was pleasant and cooperative. Status After Intervention:  Improved     Participation Level:  Active Listener,  sharing, supportive     Participation Quality:  Attentive, sharing, supportive        Speech:  Normal     Thought Process/Content: Logical ,some thought blocking, needs some additional time to process and verbalize decision making         Affective Functioning: Congruent, brightened     Mood: Euthymic     Level of consciousness:  Alert, and Attentive        Response to Learning:  Able to verbalize current knowledge, able to acknowledge/verbalize new learning, and Progressing to goal        Endings: None Reported     Modes of Intervention: Education, Support, Socialization, Exploration, Clarifying and Problem-solving        Discipline Responsible: Psychoeducational Specialist        Signature:  MAGDALENE Dunn

## 2023-10-11 NOTE — PROGRESS NOTES
[address]  Date: 10/11/23  Patient Name: Ervin Barrera       Room: 7784/4413-92  MRN: [id number]   Account #: [de-identified]    : 1965  (59 y.o.)  Gender: female   Diagnosis: Severe recurrent major depression with psychotic features , per chart developmental delay             REASON FOR MISSED TREATMENT:  Patient with another ancillary department   -    Called ahead to confirm pt was okay for therapy this date. Upon arrival pt was in group at this time, will try back as time permits.         Electronically signed by DALLIN Woo on 10/11/23 at 11:40 AM EDT

## 2023-10-11 NOTE — GROUP NOTE
Group Therapy Note    Date: 10/11/2023    Group Start Time: 0900  Group End Time: 0930  Group Topic: Community Meeting    JORDAN Perez        Group Therapy Note    Attendees: 9/20     Community Meeting Group Note        Date: October 11, 2023 Start Time: 9am  End Time:  0930      Number of Participants in Group & Unit Census:  9/20    Topic: goal setting    Goal of Group: Set short term goal for the day      Comments:     Patient did not participate in [employer] group, despite staff encouragement and explanation of benefits. Patient remain seclusive to self. Q15 minute safety checks maintained for patient safety and will continue to encourage patient to attend unit programming.

## 2023-10-11 NOTE — GROUP NOTE
Group Therapy Note    Date: 10/11/2023    Group Start Time: 1606  Group End Time: 9160  Group Topic: Psychotherapy    JORDAN Camacho        Group Therapy Note    Attendees: 11/20     Patient declined to attend psychotherapy group after encouragement from staff. 1:1 talk time offered but refused. Signature:   Temo Camacho

## 2023-10-12 VITALS
DIASTOLIC BLOOD PRESSURE: 78 MMHG | BODY MASS INDEX: 28.7 KG/M2 | RESPIRATION RATE: 14 BRPM | WEIGHT: 162 LBS | HEIGHT: 63 IN | TEMPERATURE: 97.3 F | OXYGEN SATURATION: 95 % | HEART RATE: 69 BPM | SYSTOLIC BLOOD PRESSURE: 139 MMHG

## 2023-10-12 PROCEDURE — 97530 THERAPEUTIC ACTIVITIES: CPT

## 2023-10-12 PROCEDURE — 6370000000 HC RX 637 (ALT 250 FOR IP): Performed by: PSYCHIATRY & NEUROLOGY

## 2023-10-12 PROCEDURE — 99239 HOSP IP/OBS DSCHRG MGMT >30: CPT | Performed by: PSYCHIATRY & NEUROLOGY

## 2023-10-12 PROCEDURE — 6370000000 HC RX 637 (ALT 250 FOR IP)

## 2023-10-12 PROCEDURE — 6370000000 HC RX 637 (ALT 250 FOR IP): Performed by: INTERNAL MEDICINE

## 2023-10-12 RX ORDER — BUPROPION HYDROCHLORIDE 150 MG/1
150 TABLET ORAL DAILY
Qty: 30 TABLET | Refills: 0 | Status: SHIPPED | OUTPATIENT
Start: 2023-10-13

## 2023-10-12 RX ADMIN — VORTIOXETINE 20 MG: 20 TABLET, FILM COATED ORAL at 08:30

## 2023-10-12 RX ADMIN — Medication: at 08:33

## 2023-10-12 RX ADMIN — BUPROPION HYDROCHLORIDE 150 MG: 150 TABLET, EXTENDED RELEASE ORAL at 08:31

## 2023-10-12 RX ADMIN — BUDESONIDE AND FORMOTEROL FUMARATE DIHYDRATE 2 PUFF: 80; 4.5 AEROSOL RESPIRATORY (INHALATION) at 08:30

## 2023-10-12 RX ADMIN — PANTOPRAZOLE SODIUM 40 MG: 40 TABLET, DELAYED RELEASE ORAL at 08:31

## 2023-10-12 RX ADMIN — ALUMINUM HYDROXIDE, MAGNESIUM HYDROXIDE, AND SIMETHICONE 30 ML: 200; 200; 20 SUSPENSION ORAL at 09:46

## 2023-10-12 RX ADMIN — PROPRANOLOL HYDROCHLORIDE 10 MG: 20 TABLET ORAL at 08:30

## 2023-10-12 ASSESSMENT — PAIN SCALES - GENERAL
PAINLEVEL_OUTOF10: 0
PAINLEVEL_OUTOF10: 0
PAINLEVEL_OUTOF10: 3

## 2023-10-12 ASSESSMENT — PAIN - FUNCTIONAL ASSESSMENT: PAIN_FUNCTIONAL_ASSESSMENT: ACTIVITIES ARE NOT PREVENTED

## 2023-10-12 NOTE — GROUP NOTE
Group Therapy Note    Date: 10/12/2023    Group Start Time: 1100  Group End Time: 1061  Group Topic: Cognitive Skills    [facility]    MAGDALENE De Oliveira        Group Therapy Note    Attendees: 10/18     To increase social interaction, practice decision making, deductive reasoning, and communication skills . Notes: Pt was able to practice decision making, deductive reasoning, and  communication skills . Pt was pleasant and cooperative. Status After Intervention:  Improved     Participation Level: Active Listener,  sharing, supportive     Participation Quality:  Attentive, sharing, supportive        Speech:  Normal     Thought Process/Content: Logical ,linear r/t group task- slight thought blocking at times but able to give relevant answers given a little extra time. Pt shows much improvement since time of admission.          Affective Functioning: Congruent, brightened     Mood: Euthymic     Level of consciousness:  Alert, and Attentive        Response to Learning:  Able to verbalize current knowledge, able to acknowledge/verbalize new learning, and Progressing to goal        Endings: None Reported     Modes of Intervention: Education, Support, Socialization, Exploration, Clarifying and Problem-solving        Discipline Responsible: Psychoeducational Specialist        Signature:  MAGDALENE De Oliveira

## 2023-10-12 NOTE — PLAN OF CARE
Problem: Depression  Goal: Will be euthymic at discharge  Description: INTERVENTIONS:  1. Administer medication as ordered  2. Provide emotional support via 1:1 interaction with staff  3. Encourage involvement in milieu/groups/activities  4. Monitor for social isolation  10/12/2023 1136 by Wil Paul RN  Outcome: Completed  10/12/2023 0904 by Wil Paul RN  Outcome: Progressing  Note: Patient denies depression at time of assessment. Mrs. Vazquez is brightened, social in dayroom with peers and attending groups. 10/11/2023 2201 by Alexandra Phillips RN  Outcome: Progressing  Note: Patient appears brightened during assessment. She was social with peers playing cards and smiling. She states she is happy to be leaving here tomorrow. She remains anxious. Patient denied depression. She was medication compliant     Problem: Pain  Goal: Verbalizes/displays adequate comfort level or baseline comfort level  10/12/2023 1136 by Wil Paul RN  Outcome: Completed  10/12/2023 0904 by Wil Paul RN  Outcome: Progressing  Note: Mrs. Vazquez denies pain at time of assessment. Patient educated on 0-10 pain scale. 10/11/2023 2201 by Alexandra Phillips RN  Outcome: Progressing  Note: Patient denied pain during assessment. Problem: Depression/Self Harm  Goal: Effect of psychiatric condition will be minimized and patient will be protected from self harm  Description: INTERVENTIONS:  1. Assess impact of patient's symptoms on level of functioning, self care needs and offer support as indicated  2. Assess patient/family knowledge of depression, impact on illness and need for teaching  3. Provide emotional support, presence and reassurance  4. Assess for possible suicidal thoughts or ideation. If patient expresses suicidal thoughts or statements do not leave alone, initiate Suicide Precautions, move to a room close to the nursing station and obtain sitter  5.  Initiate consults as appropriate with

## 2023-10-12 NOTE — BH NOTE
[facility]  Discharge Note    Pt discharged with followings belongings:   Dental Appliances: None  Vision - Corrective Lenses: Eyeglasses  Hearing Aid: None  Jewelry: None  Body Piercings Removed: N/A  Clothing: Woody, Mu  Other Valuables: Purse   Valuables sent home with patient. Patient educated on aftercare instructions: Yes  Information faxed to [facility] by RN  at 3:22 PM .Patient verbalize understanding of AVS:  Yes. Status EXAM upon discharge:  Mental Status and Behavioral Exam  Normal: No  Level of Assistance: Independent/Self  Facial Expression: Brightened, Worried  Affect: Normal  Level of Consciousness: Alert  Frequency of Checks: 4 times per hour, close  Mood:Normal: No  Mood: Anxious  Motor Activity:Normal: Yes  Motor Activity: Decreased  Eye Contact: Fair  Observed Behavior: Friendly, Cooperative  Sexual Misconduct History: Current - no  Preception: [place] to person, [place] to time, [place] to place, [place] to situation  Attention:Normal: No  Attention: Unable to concentrate, Distractible  Thought Processes: Unremarkable  Thought Content:Normal: No  Thought Content: Preoccupations  Depression Symptoms: No problems reported or observed.   Anxiety Symptoms: Generalized  Herlinda Symptoms: Poor judgment  Hallucinations: None  Delusions: No  Memory:Normal: No  Memory: Poor recent  Insight and Judgment: No  Insight and Judgment: Poor judgment, Poor insight    Tobacco Screening:  Practical Counseling, on admission, marisabel X, if applicable and completed (first 3 are required if patient doesn't refuse):            ( ) Recognizing danger situations (included triggers and roadblocks)                    ( ) Coping skills (new ways to manage stress,relaxation techniques, changing routine, distraction)                                                           ( ) Basic information about quitting (benefits of quitting, techniques in how to quit, available resources  ( ) Referral for counseling faxed to

## 2023-10-12 NOTE — DISCHARGE INSTRUCTIONS
Information:  Medications:   Medication summary provided   I understand that I should take only the medications on my list.     -why and when I need to take each medicine.     -which side effects to watch for.     -that I should carry my medication information at all times in case of     Emergency situations. I will take all of my medicines to follow up appointments.     -check with my physician or pharmacist before taking any new    Medication, over the counter product or drink alcohol.    -Ask about food, drug or dietary supplement interactions.    -discard old lists and update records with medication providers. Notify Physician:  Notify physician if you notice:   Always call 911 if you feel your life is in danger  In case of an emergency call 911 immediately! If 911 is not available call your local emergency medical system for help    Behavioral Health Follow Up:  Original Referral Source:[place]  Discharge Diagnosis: Depression with suicidal ideation [F32. A, R45.851]  Recommendations for Level of Care: [facility]   Patient status at discharge: Calm and cooperative, alert and oriented x4  My hospital  was: [address] [place] [address]  Aftercare plan faxed: Yes   -faxed by: RN   -date: 10/12/2023   -time: 1300  Prescriptions: Electronically sent to [facility]     Smoking: Quit Smoking. Call the [employer]'s smoking quitline at 6-004-76F-QUIT  Know the signs of a heart attack   If you have any of the following symptoms call 911 immediately, do not wait more    Than five minutes. 1. Pressure, fullness and/ or squeezing in the center of the chest spreading to    The jaw, neck or shoulder. 2. Chest discomfort with light headedness, fainting, sweating, nausea or    Shortness of breath. 3. Upper abdominal pressure or discomfort. 4. Lower chest pain, back pain, unusual fatigue, shortness of breath, nausea   Or dizziness.      General Information:   Questions regarding your bill: Call HELP program

## 2023-10-12 NOTE — PROGRESS NOTES
Occupational Therapy  Facility/Department: [facility]  Daily Treatment Note  NAME: Livan Smith  : 1965  MRN: [id number]    Date of Service: 10/12/2023    RN reports patient is medically stable for therapy treatment this date. Chart reviewed prior to treatment and patient is agreeable for therapy. All lines intact and patient positioned comfortably at end of treatment. All patient needs addressed prior to ending therapy session. Discharge Recommendations:  Continue to assess pending progress, Home with assist PRN  OT Equipment Recommendations  Other: Pt would benefit from shower chair for walk-in shower to decrease falls and chance of falls. Pt demo decreased endurance and safety. Patient Diagnosis(es): There were no encounter diagnoses. Assessment    Assessment: Pt was able to identify 2 activites of interest to complete once home, pt stated goals for returning home is to be more positive and to not cause issues. Pt educated over ways to incorporate positvity in her daily life. Pt verbalized things she is grateful for and stated that she is excited to return home to see her dog. Pt educated over DME for shower as pt is fearful of falling. Pt educated over fall prevention for home and ideas to remove possible barriers. Activity Tolerance: Patient tolerated treatment well  Discharge Recommendations: Continue to assess pending progress;Home with assist PRN  Other: Pt would benefit from shower chair for walk-in shower to decrease falls and chance of falls. Pt demo decreased endurance and safety. Plan   Occupational Therapy Plan  Times Per Week: 3x  Times Per Day: Once a day  Current Treatment Recommendations: Strengthening;ROM;Balance training;Functional mobility training; Endurance training; Safety education & training;Patient/Caregiver education & training;Self-Care / ADL; Home management training;Equipment evaluation, education, & procurement;Cognitive/Perceptual training Restrictions  Restrictions/Precautions  Restrictions/Precautions: Fall Risk  Required Braces or Orthoses?: No  Implants present? : Metal implants (Hx L GABRIELLA)  Position Activity Restriction  Other position/activity restrictions: Activity as tolerated    Subjective   Subjective  Subjective: Pt was pleasant and receptive to session. Pt demo good mood throughout. RN approved session. Pain: No pain reported. Orientation  Overall Orientation Status: Within Functional Limits  Orientation Level: Oriented X4  Cognition  Overall Cognitive Status: WFL         Bed Mobility Training  Bed Mobility Training: No (Pt in group seated prior to session. Pt in room with door open standing following session.)    Balance  Sitting: Intact  Standing: Intact    Transfer Training  Transfer Training: Yes  Overall Level of Assistance: Modified independent (To and from chair)  Sit to Stand: Modified independent  Stand to Sit: Modified independent    Functional Mobility:   Overall Level of Assistance: Supervision (Pt demo slow pace, from group room to main room to bedroom, no AD.)  Interventions: Safety awareness training;Verbal cues (vc to go at steady pace, pt verbalized she is scared of falling.)  Speed/Jane: Delayed  Assistive Device:  (No AD.)     ADL  Additional Comments: ADLs not completed this date as pt verbalized having no needs. Pt educated over DME for home including shower chair to prevent falls. Safety Devices  Type of Devices: Nurse notified (Pt in room about to head to common room following session. RN notified. Pt appears in good mood with smile on face throughout.)  Restraints  Restraints Initially in Place: No     Patient Education  Education Given To: Patient  Education Provided: ADL Adaptive Strategies; Fall Prevention Strategies; Role of Therapy;Plan of Care;Energy Conservation  Education Provided Comments: Pt educated over DME for ECT and to prevent falls.  Education over leisure exploration and ideas for

## 2023-10-12 NOTE — CARE COORDINATION
Name: Ava Hatchet    : 1965    Discharge Date: 10/12/23    Primary Auth/Cert #: [id number]     Destination: home     *If you have any specific discharge questions, please contact the /discharge planner:      Discharge Medications:      Medication List        START taking these medications      buPROPion 150 MG extended release tablet  Commonly known as: WELLBUTRIN XL  Take 1 tablet by mouth daily  Start taking on: 2023  Replaces: buPROPion 100 MG extended release tablet  Notes to patient: Mood            CONTINUE taking these medications      Breo Ellipta 200-25 MCG/ACT Aepb inhaler  Generic drug: fluticasone furoate-vilanterol  Notes to patient: Breathing     docusate sodium 100 MG capsule  Commonly known as: COLACE  Notes to patient: Constipation      ferrous sulfate 325 (65 Fe) MG tablet  Commonly known as: IRON 325  Notes to patient: Supplement     omeprazole 40 MG delayed release capsule  Commonly known as: PRILOSEC  Notes to patient: GERD     propranolol 10 MG tablet  Commonly known as: INDERAL  Notes to patient: Blood pressure     Vitamin D3 1.25 MG (95009 UT) Caps  Notes to patient: Supplement  Only take this medication 1 time weekly on the same day.       VORTIoxetine HBr 20 MG Tabs tablet  Commonly known as: TRINTELLIX  Notes to patient: Mood            STOP taking these medications      buPROPion 100 MG extended release tablet  Commonly known as: WELLBUTRIN SR  Replaced by: buPROPion 150 MG extended release tablet     clonazePAM 1 MG tablet  Commonly known as: KLONOPIN     fexofenadine 180 MG tablet  Commonly known as: ALLEGRA     miconazole 2 % powder  Commonly known as: MICOTIN     potassium chloride 10 MEQ extended release tablet  Commonly known as: KLOR-CON M     venlafaxine 150 MG extended release capsule  Commonly known as: EFFEXOR XR     Vraylar 1.5 MG capsule  Generic drug: cariprazine hcl               Where to Get Your Medications        These medications were

## 2023-10-12 NOTE — PLAN OF CARE
Problem: Depression  Goal: Will be euthymic at discharge  Description: INTERVENTIONS:  1. Administer medication as ordered  2. Provide emotional support via 1:1 interaction with staff  3. Encourage involvement in milieu/groups/activities  4. Monitor for social isolation  Outcome: Progressing  Note: Patient appears brightened during assessment. She was social with peers playing cards and smiling. She states she is happy to be leaving here tomorrow. She remains anxious. Patient denied depression. She was medication compliant     Problem: Pain  Goal: Verbalizes/displays adequate comfort level or baseline comfort level  Outcome: Progressing  Note: Patient denied pain during assessment. Problem: Depression/Self Harm  Goal: Effect of psychiatric condition will be minimized and patient will be protected from self harm  Description: INTERVENTIONS:  1. Assess impact of patient's symptoms on level of functioning, self care needs and offer support as indicated  2. Assess patient/family knowledge of depression, impact on illness and need for teaching  3. Provide emotional support, presence and reassurance  4. Assess for possible suicidal thoughts or ideation. If patient expresses suicidal thoughts or statements do not leave alone, initiate Suicide Precautions, move to a room close to the nursing station and obtain sitter  5.  Initiate consults as appropriate with Mental Health Professional, Spiritual Care, Psychosocial CNS, and consider a recommendation to the [facility] for a Psychiatric Consultation  10/11/2023 2201 by Kathy Reyes RN  Outcome: Progressing

## 2023-10-12 NOTE — GROUP NOTE
Group Therapy Note    Date: 10/12/2023    Group Start Time: 1430  Group End Time: 1237  Group Topic: Cognitive Skills    MAGDALENE Szymanski        Group Therapy Note    Attendees:          Patient's Goal:  ***    Notes:  ***    Status After Intervention:  {Status After Intervention:784497806}    Participation Level: {Participation Level:898827469}    Participation Quality: {[facility] PARTICIPATION QUALITY:793216897}      Speech:  {ED  CD_SPEECH:25513}      Thought Process/Content: {Thought Process/Content:606899079}      Affective Functioning: {Affective Functionin}      Mood: {Mood:503436832}      Level of consciousness:  {Level of consciousness:104331748}      Response to Learning: {[facility] Responses to Learnin}      Endings: {[facility] Endings:90799}    Modes of Intervention: {[facility] Modes of Intervention:682683281}      Discipline Responsible: {[facility] Multidisciplinary:119763595}      Signature:  Hilario High

## 2023-10-12 NOTE — PLAN OF CARE
Problem: Depression  Goal: Will be euthymic at discharge  Description: INTERVENTIONS:  1. Administer medication as ordered  2. Provide emotional support via 1:1 interaction with staff  3. Encourage involvement in milieu/groups/activities  4. Monitor for social isolation  10/12/2023 0904 by Eduardo Ny RN  Outcome: Progressing  Note: Patient denies depression at time of assessment. Mrs. Vazquez is brightened, social in dayroom with peers and attending groups. 10/11/2023 2201 by Terence Brandon RN  Outcome: Progressing  Note: Patient appears brightened during assessment. She was social with peers playing cards and smiling. She states she is happy to be leaving here tomorrow. She remains anxious. Patient denied depression. She was medication compliant     Problem: Pain  Goal: Verbalizes/displays adequate comfort level or baseline comfort level  10/12/2023 0904 by Eduardo yN RN  Outcome: Progressing  Note: Mrs. Vazquez denies pain at time of assessment. Patient educated on 0-10 pain scale. 10/11/2023 2201 by Terence Brandon RN  Outcome: Progressing  Note: Patient denied pain during assessment. Problem: Depression/Self Harm  Goal: Effect of psychiatric condition will be minimized and patient will be protected from self harm  Description: INTERVENTIONS:  1. Assess impact of patient's symptoms on level of functioning, self care needs and offer support as indicated  2. Assess patient/family knowledge of depression, impact on illness and need for teaching  3. Provide emotional support, presence and reassurance  4. Assess for possible suicidal thoughts or ideation. If patient expresses suicidal thoughts or statements do not leave alone, initiate Suicide Precautions, move to a room close to the nursing station and obtain sitter  5.  Initiate consults as appropriate with Mental Health Professional, Spiritual Care, Psychosocial CNS, and consider a recommendation to the [facility] for a Psychiatric Consultation  10/12/2023 0904 by Beto Park RN  Outcome: Progressing  Note: Mrs. Vazquez denies thoughts of self harm at time of assessment. Patient reports she is looking forward to discharge, seeing her family and spending time with several friends.    10/11/2023 2201 by Meeta Berger RN  Outcome: Progressing

## 2023-10-12 NOTE — GROUP NOTE
Group Therapy Note    Date: 10/12/2023    Group Start Time: 1000  Group End Time: 1030  Group Topic: Psychotherapy    [facility]    Calli Yoon        Group Therapy Note    Attendees: 10/19       Patient's Goal:   PT will demonstrate increased interpersonal interaction and a clear understanding on multiple types of coping skills relating to the here-and-now therapeutic practice. Notes:  Patient is making progress, AEB participating in group discussion, actively listening, and supporting other group members. PT participates in group and encourages others to participate        Status After Intervention:  Improved    Participation Level: Active Listener and Interactive    Participation Quality: Appropriate, Attentive, and Sharing      Speech:  normal      Thought Process/Content: Logical      Affective Functioning: Congruent      Mood: euthymic      Level of consciousness:  Alert, Oriented x4, and Attentive      Response to Learning: Able to verbalize/acknowledge new learning and Progressing to goal      Endings: None Reported    Modes of Intervention: Support, Socialization, Exploration, and Clarifying      Discipline Responsible: /Counselor      Signature:   Calli Yoon
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
## 2024-03-12 ENCOUNTER — HOSPITAL ENCOUNTER (INPATIENT)
Age: 59
LOS: 6 days | Discharge: HOME | DRG: 885 | End: 2024-03-18
Attending: EMERGENCY MEDICINE | Admitting: PSYCHIATRY & NEUROLOGY
Payer: COMMERCIAL

## 2024-03-12 DIAGNOSIS — R45.851 SUICIDAL IDEATION: Primary | ICD-10-CM

## 2024-03-12 DIAGNOSIS — D50.8 OTHER IRON DEFICIENCY ANEMIA: ICD-10-CM

## 2024-03-12 LAB
ALBUMIN SERPL-MCNC: 4.1 G/DL (ref 3.5–5.2)
ALP SERPL-CCNC: 86 U/L (ref 35–104)
ALT SERPL-CCNC: 8 U/L (ref 5–33)
AMPHET UR QL SCN: NEGATIVE
ANION GAP SERPL CALCULATED.3IONS-SCNC: 15 MMOL/L (ref 9–17)
APAP SERPL-MCNC: <5 UG/ML (ref 10–30)
AST SERPL-CCNC: 13 U/L
BARBITURATES UR QL SCN: NEGATIVE
BASOPHILS # BLD: 0.1 K/UL (ref 0–0.2)
BASOPHILS NFR BLD: 1 % (ref 0–2)
BENZODIAZ UR QL: NEGATIVE
BILIRUB SERPL-MCNC: 0.3 MG/DL (ref 0.3–1.2)
BUN SERPL-MCNC: 13 MG/DL (ref 6–20)
CALCIUM SERPL-MCNC: 9.8 MG/DL (ref 8.6–10.4)
CANNABINOIDS UR QL SCN: NEGATIVE
CHLORIDE SERPL-SCNC: 101 MMOL/L (ref 98–107)
CO2 SERPL-SCNC: 24 MMOL/L (ref 20–31)
COCAINE UR QL SCN: NEGATIVE
CREAT SERPL-MCNC: 0.7 MG/DL (ref 0.5–0.9)
EOSINOPHIL # BLD: 0.2 K/UL (ref 0–0.4)
EOSINOPHILS RELATIVE PERCENT: 2 % (ref 0–4)
ERYTHROCYTE [DISTWIDTH] IN BLOOD BY AUTOMATED COUNT: 14.7 % (ref 11.5–14.9)
ETHANOL PERCENT: <0.01 %
ETHANOLAMINE SERPL-MCNC: <10 MG/DL
FENTANYL UR QL: NEGATIVE
GFR SERPL CREATININE-BSD FRML MDRD: >60 ML/MIN/1.73M2
GLUCOSE SERPL-MCNC: 121 MG/DL (ref 70–99)
HCT VFR BLD AUTO: 43.3 % (ref 36–46)
HGB BLD-MCNC: 14.2 G/DL (ref 12–16)
LYMPHOCYTES NFR BLD: 1.2 K/UL (ref 1–4.8)
LYMPHOCYTES RELATIVE PERCENT: 12 % (ref 24–44)
MCH RBC QN AUTO: 28.8 PG (ref 26–34)
MCHC RBC AUTO-ENTMCNC: 32.8 G/DL (ref 31–37)
MCV RBC AUTO: 87.8 FL (ref 80–100)
METHADONE UR QL: POSITIVE
MONOCYTES NFR BLD: 0.8 K/UL (ref 0.1–1.3)
MONOCYTES NFR BLD: 8 % (ref 1–7)
NEUTROPHILS NFR BLD: 77 % (ref 36–66)
NEUTS SEG NFR BLD: 7.6 K/UL (ref 1.3–9.1)
OPIATES UR QL SCN: NEGATIVE
OXYCODONE UR QL SCN: NEGATIVE
PCP UR QL SCN: NEGATIVE
PLATELET # BLD AUTO: 388 K/UL (ref 150–450)
PMV BLD AUTO: 9 FL (ref 6–12)
POTASSIUM SERPL-SCNC: 3.7 MMOL/L (ref 3.7–5.3)
PROT SERPL-MCNC: 7.3 G/DL (ref 6.4–8.3)
RBC # BLD AUTO: 4.92 M/UL (ref 4–5.2)
SALICYLATES SERPL-MCNC: <1 MG/DL (ref 3–10)
SODIUM SERPL-SCNC: 140 MMOL/L (ref 135–144)
TEST INFORMATION: ABNORMAL
WBC OTHER # BLD: 9.9 K/UL (ref 3.5–11)

## 2024-03-12 PROCEDURE — 85025 COMPLETE CBC W/AUTO DIFF WBC: CPT

## 2024-03-12 PROCEDURE — 6370000000 HC RX 637 (ALT 250 FOR IP): Performed by: PSYCHIATRY & NEUROLOGY

## 2024-03-12 PROCEDURE — 36415 COLL VENOUS BLD VENIPUNCTURE: CPT

## 2024-03-12 PROCEDURE — 80307 DRUG TEST PRSMV CHEM ANLYZR: CPT

## 2024-03-12 PROCEDURE — 80143 DRUG ASSAY ACETAMINOPHEN: CPT

## 2024-03-12 PROCEDURE — G0480 DRUG TEST DEF 1-7 CLASSES: HCPCS

## 2024-03-12 PROCEDURE — 80053 COMPREHEN METABOLIC PANEL: CPT

## 2024-03-12 PROCEDURE — 1240000000 HC EMOTIONAL WELLNESS R&B

## 2024-03-12 PROCEDURE — 99285 EMERGENCY DEPT VISIT HI MDM: CPT

## 2024-03-12 PROCEDURE — 80179 DRUG ASSAY SALICYLATE: CPT

## 2024-03-12 RX ORDER — DIPHENHYDRAMINE HYDROCHLORIDE 50 MG/ML
50 INJECTION INTRAMUSCULAR; INTRAVENOUS EVERY 6 HOURS PRN
Status: DISCONTINUED | OUTPATIENT
Start: 2024-03-12 | End: 2024-03-18 | Stop reason: HOSPADM

## 2024-03-12 RX ORDER — HALOPERIDOL 5 MG/ML
5 INJECTION INTRAMUSCULAR EVERY 6 HOURS PRN
Status: DISCONTINUED | OUTPATIENT
Start: 2024-03-12 | End: 2024-03-18 | Stop reason: HOSPADM

## 2024-03-12 RX ORDER — MAGNESIUM HYDROXIDE/ALUMINUM HYDROXICE/SIMETHICONE 120; 1200; 1200 MG/30ML; MG/30ML; MG/30ML
30 SUSPENSION ORAL EVERY 6 HOURS PRN
Status: DISCONTINUED | OUTPATIENT
Start: 2024-03-12 | End: 2024-03-18 | Stop reason: HOSPADM

## 2024-03-12 RX ORDER — ACETAMINOPHEN 325 MG/1
650 TABLET ORAL EVERY 6 HOURS PRN
Status: DISCONTINUED | OUTPATIENT
Start: 2024-03-12 | End: 2024-03-18 | Stop reason: HOSPADM

## 2024-03-12 RX ORDER — IBUPROFEN 400 MG/1
400 TABLET ORAL EVERY 6 HOURS PRN
Status: DISCONTINUED | OUTPATIENT
Start: 2024-03-12 | End: 2024-03-18 | Stop reason: HOSPADM

## 2024-03-12 RX ORDER — HALOPERIDOL 5 MG/1
5 TABLET ORAL EVERY 6 HOURS PRN
Status: DISCONTINUED | OUTPATIENT
Start: 2024-03-12 | End: 2024-03-18 | Stop reason: HOSPADM

## 2024-03-12 RX ORDER — POLYETHYLENE GLYCOL 3350 17 G
2 POWDER IN PACKET (EA) ORAL
Status: DISCONTINUED | OUTPATIENT
Start: 2024-03-12 | End: 2024-03-18 | Stop reason: HOSPADM

## 2024-03-12 RX ORDER — POLYETHYLENE GLYCOL 3350 17 G/17G
17 POWDER, FOR SOLUTION ORAL DAILY PRN
Status: DISCONTINUED | OUTPATIENT
Start: 2024-03-12 | End: 2024-03-18 | Stop reason: HOSPADM

## 2024-03-12 RX ORDER — TRAZODONE HYDROCHLORIDE 50 MG/1
50 TABLET ORAL NIGHTLY PRN
Status: DISCONTINUED | OUTPATIENT
Start: 2024-03-12 | End: 2024-03-18 | Stop reason: HOSPADM

## 2024-03-12 RX ORDER — HYDROXYZINE 50 MG/1
50 TABLET, FILM COATED ORAL 3 TIMES DAILY PRN
Status: DISCONTINUED | OUTPATIENT
Start: 2024-03-12 | End: 2024-03-18 | Stop reason: HOSPADM

## 2024-03-12 RX ADMIN — ALUMINUM HYDROXIDE, MAGNESIUM HYDROXIDE, AND SIMETHICONE 30 ML: 1200; 120; 1200 SUSPENSION ORAL at 21:20

## 2024-03-12 RX ADMIN — HALOPERIDOL 5 MG: 5 TABLET ORAL at 21:17

## 2024-03-12 ASSESSMENT — SLEEP AND FATIGUE QUESTIONNAIRES
AVERAGE NUMBER OF SLEEP HOURS: 6
DO YOU USE A SLEEP AID: NO

## 2024-03-12 ASSESSMENT — LIFESTYLE VARIABLES
HOW OFTEN DO YOU HAVE A DRINK CONTAINING ALCOHOL: NEVER
HOW MANY STANDARD DRINKS CONTAINING ALCOHOL DO YOU HAVE ON A TYPICAL DAY: PATIENT DOES NOT DRINK
HOW OFTEN DO YOU HAVE A DRINK CONTAINING ALCOHOL: NEVER
HOW MANY STANDARD DRINKS CONTAINING ALCOHOL DO YOU HAVE ON A TYPICAL DAY: PATIENT DOES NOT DRINK

## 2024-03-12 ASSESSMENT — PATIENT HEALTH QUESTIONNAIRE - PHQ9
SUM OF ALL RESPONSES TO PHQ9 QUESTIONS 1 & 2: 2
SUM OF ALL RESPONSES TO PHQ QUESTIONS 1-9: 2
2. FEELING DOWN, DEPRESSED OR HOPELESS: SEVERAL DAYS
1. LITTLE INTEREST OR PLEASURE IN DOING THINGS: SEVERAL DAYS
SUM OF ALL RESPONSES TO PHQ QUESTIONS 1-9: 2

## 2024-03-12 ASSESSMENT — PAIN DESCRIPTION - DESCRIPTORS: DESCRIPTORS: BURNING

## 2024-03-12 ASSESSMENT — PAIN SCALES - GENERAL: PAINLEVEL_OUTOF10: 7

## 2024-03-12 ASSESSMENT — PAIN DESCRIPTION - LOCATION: LOCATION: CHEST

## 2024-03-12 NOTE — ED PROVIDER NOTES
[facility]  EMERGENCY DEPARTMENT ENCOUNTER      Pt Name: Mae Vazquez  MRN: [id number]  Birthdate 1965  Date of evaluation: 3/12/24      CHIEF COMPLAINT     Mental Health Evaluation    HISTORY OF PRESENT ILLNESS   HPI 58 y.o. female presents for mental health evaluation.  The patient was brought to the emergency department by outpatient mental health providers ([facility]).     .  The patient reports feeling depressed and having thought of suicide for the last month.  The patient has been thinking of overdosing on pills.  The patient reports that their symptoms were provoked by \"no one loves me\" \"I don't feel wanted in my home\".  She reports that she is living with her niece and niece's wife.      The patient does have a h/o of previous psychiatric admission, most recently admitted here in 10/1/23.  The patient denies drug use, denies attempts at self harm to this point. The patient denies medical complaints at this time.    REVIEW OF SYSTEMS     Review of Systems   Constitutional:  Negative for fever.   HENT:  Negative for congestion.    Respiratory:  Negative for cough.    Cardiovascular:  Negative for chest pain.   Gastrointestinal:  Negative for abdominal pain.   Genitourinary:  Negative for dysuria.   Psychiatric/Behavioral:  Positive for decreased concentration, dysphoric mood, sleep disturbance and suicidal ideas.          PAST MEDICAL HISTORY     Past Medical History:   Diagnosis Date    Asthma     GERD (gastroesophageal reflux disease)     Iron deficiency anemia     Schizoaffective disorder, bipolar type (HCC)        SURGICAL HISTORY       Past Surgical History:   Procedure Laterality Date    [place] HEMIARTHROPLASTY HIP PARTIAL      Hip Replacement, Partial, left       CURRENT MEDICATIONS       Current Discharge Medication List        CONTINUE these medications which have NOT CHANGED    Details   buPROPion (WELLBUTRIN XL) 150 MG extended release tablet Take 1 tablet by mouth daily  Qty: 30 tablet, Refills: 0
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
## 2024-03-12 NOTE — ED NOTES
This writer consulted with Dr Orlando who recommended inpatient hospitalization for safety and stabilization.  Patient placed on application for emergency admission to [facility].

## 2024-03-13 PROBLEM — F33.2 MDD (MAJOR DEPRESSIVE DISORDER), RECURRENT SEVERE, WITHOUT PSYCHOSIS (HCC): Status: ACTIVE | Noted: 2024-03-13

## 2024-03-13 PROBLEM — R45.851 SUICIDAL IDEATION: Status: ACTIVE | Noted: 2024-03-13

## 2024-03-13 PROCEDURE — 6370000000 HC RX 637 (ALT 250 FOR IP): Performed by: INTERNAL MEDICINE

## 2024-03-13 PROCEDURE — 99222 1ST HOSP IP/OBS MODERATE 55: CPT | Performed by: INTERNAL MEDICINE

## 2024-03-13 PROCEDURE — 6370000000 HC RX 637 (ALT 250 FOR IP): Performed by: PSYCHIATRY & NEUROLOGY

## 2024-03-13 PROCEDURE — 99223 1ST HOSP IP/OBS HIGH 75: CPT | Performed by: PSYCHIATRY & NEUROLOGY

## 2024-03-13 PROCEDURE — 1240000000 HC EMOTIONAL WELLNESS R&B

## 2024-03-13 PROCEDURE — 6360000002 HC RX W HCPCS: Performed by: PSYCHIATRY & NEUROLOGY

## 2024-03-13 RX ORDER — BUSPIRONE HYDROCHLORIDE 10 MG/1
10 TABLET ORAL 3 TIMES DAILY
COMMUNITY

## 2024-03-13 RX ORDER — VENLAFAXINE 37.5 MG/1
37.5 TABLET ORAL 3 TIMES DAILY
Status: ON HOLD | COMMUNITY
End: 2024-03-14 | Stop reason: ALTCHOICE

## 2024-03-13 RX ORDER — PANTOPRAZOLE SODIUM 40 MG/1
40 TABLET, DELAYED RELEASE ORAL
Status: DISCONTINUED | OUTPATIENT
Start: 2024-03-14 | End: 2024-03-18 | Stop reason: HOSPADM

## 2024-03-13 RX ORDER — FERROUS SULFATE 325(65) MG
325 TABLET ORAL
Status: DISCONTINUED | OUTPATIENT
Start: 2024-03-14 | End: 2024-03-18 | Stop reason: HOSPADM

## 2024-03-13 RX ORDER — ERGOCALCIFEROL 1.25 MG/1
50000 CAPSULE ORAL WEEKLY
Status: DISCONTINUED | OUTPATIENT
Start: 2024-03-15 | End: 2024-03-18 | Stop reason: HOSPADM

## 2024-03-13 RX ORDER — OLANZAPINE 10 MG/1
10 TABLET ORAL NIGHTLY
Status: DISCONTINUED | OUTPATIENT
Start: 2024-03-13 | End: 2024-03-18 | Stop reason: HOSPADM

## 2024-03-13 RX ORDER — MIRTAZAPINE 7.5 MG/1
7.5 TABLET, FILM COATED ORAL NIGHTLY
Status: ON HOLD | COMMUNITY
End: 2024-03-18 | Stop reason: HOSPADM

## 2024-03-13 RX ORDER — BUDESONIDE AND FORMOTEROL FUMARATE DIHYDRATE 80; 4.5 UG/1; UG/1
2 AEROSOL RESPIRATORY (INHALATION)
Status: DISCONTINUED | OUTPATIENT
Start: 2024-03-13 | End: 2024-03-18 | Stop reason: HOSPADM

## 2024-03-13 RX ORDER — BUSPIRONE HYDROCHLORIDE 10 MG/1
10 TABLET ORAL 3 TIMES DAILY
Status: DISCONTINUED | OUTPATIENT
Start: 2024-03-13 | End: 2024-03-18 | Stop reason: HOSPADM

## 2024-03-13 RX ORDER — DOCUSATE SODIUM 100 MG/1
100 CAPSULE, LIQUID FILLED ORAL DAILY PRN
Status: DISCONTINUED | OUTPATIENT
Start: 2024-03-13 | End: 2024-03-18 | Stop reason: HOSPADM

## 2024-03-13 RX ORDER — OLANZAPINE 10 MG/1
10 TABLET ORAL NIGHTLY
COMMUNITY

## 2024-03-13 RX ADMIN — BUSPIRONE HYDROCHLORIDE 10 MG: 10 TABLET ORAL at 20:48

## 2024-03-13 RX ADMIN — DIPHENHYDRAMINE HYDROCHLORIDE 50 MG: 50 INJECTION INTRAMUSCULAR; INTRAVENOUS at 15:35

## 2024-03-13 RX ADMIN — BUDESONIDE AND FORMOTEROL FUMARATE DIHYDRATE 2 PUFF: 80; 4.5 AEROSOL RESPIRATORY (INHALATION) at 20:47

## 2024-03-13 RX ADMIN — OLANZAPINE 10 MG: 10 TABLET, FILM COATED ORAL at 20:47

## 2024-03-13 RX ADMIN — VORTIOXETINE 30 MG: 20 TABLET, FILM COATED ORAL at 19:25

## 2024-03-13 RX ADMIN — HYDROXYZINE HYDROCHLORIDE 50 MG: 50 TABLET, FILM COATED ORAL at 15:00

## 2024-03-13 RX ADMIN — TRAZODONE HYDROCHLORIDE 50 MG: 50 TABLET ORAL at 01:09

## 2024-03-13 RX ADMIN — HYDROXYZINE HYDROCHLORIDE 50 MG: 50 TABLET, FILM COATED ORAL at 01:09

## 2024-03-13 RX ADMIN — TRAZODONE HYDROCHLORIDE 50 MG: 50 TABLET ORAL at 20:48

## 2024-03-13 RX ADMIN — HALOPERIDOL LACTATE 5 MG: 5 INJECTION, SOLUTION INTRAMUSCULAR at 15:35

## 2024-03-13 ASSESSMENT — LIFESTYLE VARIABLES
HOW MANY STANDARD DRINKS CONTAINING ALCOHOL DO YOU HAVE ON A TYPICAL DAY: PATIENT DOES NOT DRINK
HOW OFTEN DO YOU HAVE A DRINK CONTAINING ALCOHOL: NEVER

## 2024-03-13 ASSESSMENT — PAIN SCALES - GENERAL: PAINLEVEL_OUTOF10: 0

## 2024-03-13 NOTE — GROUP NOTE
Group Therapy Note    Date: 3/13/2024    Group Start Time: 1100  Group End Time: 1145  Group Topic: Cognitive Skills    Deya Coppola CTRS        Group Therapy Note    Attendees: 4/15     Patient's Goal: Topic: To increase social interaction, practice problem solving, and   communication skills.          Notes:  Pt was pleasant and cooperative, and participated fully in group task. Pt was   able practice problem solving, and communication skills independently..          Status After Intervention:  Improved     Participation Level: Active Listener and Interactive r/t  task and topic, supportive      Participation Quality: Appropriate, Attentive, engaged r/t task and topic, supportive     Speech:  normal .       Thought Process/Content: Logical, linear r/t task and topic . Mild thought blocking but given a little extra time pt performed task well, and at times was able to give spontaneous answers.      Affective Functioning: Blunted, brightened.       Mood: Cooperative, euthymic      Level of consciousness:  Alert, and Attentive      Response to Learning: Able to verbalize current knowledge/experience, Able to   verbalize/acknowledge new learning, and Progressing to goal      Endings: None Reported      Modes of Intervention: Education, Support, Socialization, and Problem-solving    Discipline Responsible: Psychoeducational Specialist      Signature:  MAGDALENE BAEZ

## 2024-03-13 NOTE — GROUP NOTE
Group Therapy Note    Date: 3/13/2024    Group Start Time: 1000  Group End Time: 1030  Group Topic: Psychoeducation    Adryan Madrigal        Group Therapy Note    Attendees: 3/16     Patient declined to attend psychotherapy group after encouragement from staff.  1:1 talk time offered but refused.         Signature:  Adryan Avalos

## 2024-03-13 NOTE — H&P
[facility] Internal Medicine  Refugio Powell MD; Sumanth Young MD, Reji Bethea MD, Estrella Multani MD, Abad Fairchild MD; Margarita Arnold MD    [facility] Internal Medicine   IN-PATIENT SERVICE   [facility]     HISTORY AND PHYSICAL EXAMINATION            Date:   3/13/2024  Patient name:  Mae Vazquez  Date of admission:  3/12/2024  5:37 PM  MRN:   [id number]  Account:  [id number]  YOB: 1965  PCP:    Shirley Boss APRN - MARGARET  Room:   [address] [place] [address]  Code Status:    Full Code      Chief Complaint:     Suicidal /Ac Psychosis    History Obtained From:     Patient/EMR/bedside RN     History of Present Illness:   58-year-old  lady class I obese BMI 31.54 with a history of GERD and moderate persistent asthma denies any fever chills nausea vomiting denies any chest pain no dyspnea at rest no dyspnea on minimal exertion      Past Medical History:     Past Medical History:   Diagnosis Date    Asthma     GERD (gastroesophageal reflux disease)     Iron deficiency anemia     Schizoaffective disorder, bipolar type (HCC)         Past Surgical History:     Past Surgical History:   Procedure Laterality Date    [place] HEMIARTHROPLASTY HIP PARTIAL      Hip Replacement, Partial, left        Medications Prior to Admission:     Prior to Admission medications    Medication Sig Start Date End Date Taking? Authorizing Provider   mirtazapine (REMERON) 7.5 MG tablet Take 1 tablet by mouth nightly   Yes Provider, MD Damian   OLANZapine (ZYPREXA) 10 MG tablet Take 1 tablet by mouth nightly   Yes Provider, MD Damian   venlafaxine (EFFEXOR) 37.5 MG tablet Take 1 tablet by mouth 3 times daily   Yes Provider, Historical, MD   busPIRone (BUSPAR) 10 MG tablet Take 1 tablet by mouth 3 times daily   Yes Provider, Historical, MD   buPROPion (WELLBUTRIN XL) 150 MG extended release tablet Take 1 tablet by mouth daily  Patient not taking: Reported on 3/13/2024 
>60 mg/dL - Negative Risk  ---------------------------------------------   VLDL  0 - 30 mg/dL 21    LDL (calc)  <130 mg/dL 101       LDL    <100 mg/dL - Desirable  LDL    >160 mg/dL - High Risk  ---------------------------------------------   Cholesterol:HDL Ratio  1.0 - 5.0 4.0    Resulting Agency [facility]    Specimen Collected: 09/20/23 05:53    Performed by: Publer Last Resulted: 09/20/23 08:37     Component  Ref Range & Units 5 mo ago Comments   Hemoglobin A1C  4.4 - 5.6 % 5.3 NOTE                   ADA Guidelines           Result                  HgbA1c        ------------           ---------------        Normal :               less than 5.7 %        Prediabetes :          5.7 %  to 6.4 %        Diabetes :             > 6.4 %    Use with caution in patients with abnormal hemoglobin variants as  the half-life of red blood cells and in vivo glycation rates are  affected.   Average glucose  mg/dL 105    Resulting [facility]    Specimen Collected: 09/20/23 05:53    Performed by: SUNQUEST Last Resulted: 09/20/23 10:27          Review of Systems   Constitutional: Negative for chills and weight loss.   HENT: Negative for ear pain and nosebleeds.    Eyes: Negative for blurred vision and photophobia.   Respiratory: Negative for cough, shortness of breath and wheezing.    Cardiovascular: Negative for chest pain and palpitations.   Gastrointestinal: Negative for abdominal pain, diarrhea and vomiting.   Genitourinary: Negative for dysuria and urgency.   Musculoskeletal: Negative for falls and joint pain.   Skin: Negative for itching and rash.   Neurological: Negative for tremors, seizures and weakness.   Endo/Heme/Allergies: Does not bruise/bleed easily.      Mental Status Examination:    Level of consciousness: Awake and alert  Appearance:  Appropriate attire, resting in bed, poor grooming   Behavior/Motor: Approachable, engages with interviewer, anxious  Attitude toward examiner:  Cooperative,

## 2024-03-13 NOTE — GROUP NOTE
Group Therapy Note    Date: 3/13/2024    Group Start Time: 1330  Group End Time: 1425  Group Topic: Cognitive Skills    [facility] Deya Finney CTRS        Group Therapy Note    Attendees: 2/11     Topic: To increase social interaction, practice decison making, multi focus concentration.  and communication skills.      Patient came to group and expressed enjoyment of group earlier this morning.   However, after several minutes pt c/o feeling tired and needing to rest and did not participate   in Cognitive Skills Group at 1330, despite staff encouragement   and explanation of benefits. Patient is pleasant on approach.     Q15 minute safety checks maintained for patient safety and will continue to encourage   patient to attend unit programming.       Discipline Responsible: Psychoeducational Specialist  Signature:  MAGDALENE BAEZ

## 2024-03-14 PROCEDURE — 6370000000 HC RX 637 (ALT 250 FOR IP): Performed by: INTERNAL MEDICINE

## 2024-03-14 PROCEDURE — 99232 SBSQ HOSP IP/OBS MODERATE 35: CPT | Performed by: PSYCHIATRY & NEUROLOGY

## 2024-03-14 PROCEDURE — 6370000000 HC RX 637 (ALT 250 FOR IP): Performed by: PSYCHIATRY & NEUROLOGY

## 2024-03-14 PROCEDURE — 1240000000 HC EMOTIONAL WELLNESS R&B

## 2024-03-14 PROCEDURE — APPSS30 APP SPLIT SHARED TIME 16-30 MINUTES

## 2024-03-14 RX ORDER — VENLAFAXINE HYDROCHLORIDE 37.5 MG/1
37.5 CAPSULE, EXTENDED RELEASE ORAL DAILY
Status: ON HOLD | COMMUNITY
End: 2024-03-15 | Stop reason: HOSPADM

## 2024-03-14 RX ADMIN — BUDESONIDE AND FORMOTEROL FUMARATE DIHYDRATE 2 PUFF: 80; 4.5 AEROSOL RESPIRATORY (INHALATION) at 21:00

## 2024-03-14 RX ADMIN — BUDESONIDE AND FORMOTEROL FUMARATE DIHYDRATE 2 PUFF: 80; 4.5 AEROSOL RESPIRATORY (INHALATION) at 08:25

## 2024-03-14 RX ADMIN — BUSPIRONE HYDROCHLORIDE 10 MG: 10 TABLET ORAL at 08:24

## 2024-03-14 RX ADMIN — HYDROXYZINE HYDROCHLORIDE 50 MG: 50 TABLET, FILM COATED ORAL at 21:01

## 2024-03-14 RX ADMIN — TRAZODONE HYDROCHLORIDE 50 MG: 50 TABLET ORAL at 21:01

## 2024-03-14 RX ADMIN — PANTOPRAZOLE SODIUM 40 MG: 40 TABLET, DELAYED RELEASE ORAL at 08:24

## 2024-03-14 RX ADMIN — BUSPIRONE HYDROCHLORIDE 10 MG: 10 TABLET ORAL at 14:25

## 2024-03-14 RX ADMIN — OLANZAPINE 10 MG: 10 TABLET, FILM COATED ORAL at 21:01

## 2024-03-14 RX ADMIN — BUSPIRONE HYDROCHLORIDE 10 MG: 10 TABLET ORAL at 21:01

## 2024-03-14 RX ADMIN — VORTIOXETINE 30 MG: 20 TABLET, FILM COATED ORAL at 08:24

## 2024-03-14 RX ADMIN — FERROUS SULFATE TAB 325 MG (65 MG ELEMENTAL FE) 325 MG: 325 (65 FE) TAB at 08:24

## 2024-03-14 NOTE — GROUP NOTE
Group Therapy Note    Date: 3/14/2024    Group Start Time: 1430  Group End Time: 1525  Group Topic: Cognitive Skills    [facility]    Deya Ferreira CTRS     Attendees: 6/8    Patient's Goal: Topic: To increase social interaction, expressing feelings and explore stress boosters and stress busters through discussion with RT and peers.       Notes:  Pt was pleasant and cooperative, and participated in group task. Pt was able to practice expressing feelings, and explore stress boosters and stress busters through discussion with RT and peers.    Pt shared individually in simple terms, and was attentive and supportive to peers sharing and able to  relate to some ideas, feelings, and experiences of peers.      Status After Intervention:  Improved     Participation Level: Active Listener and Interactive r/t  task and topic, supportive      Participation Quality: Appropriate, Attentive, engaged r/t task and topic, supportive     Speech:  normal .       Thought Process/Content: Logical, mostly linear r/t task and topic .Pt needed minimal prompts x2 to return to topic rather than focusing on attire. RT asked pt to focus on group while in group, and that RT would assist pt with attire at end of group. Pt was appreciative and cooperative.       Affective Functioning: Congruent, brightened.       Mood: Cooperative, euthymic      Level of consciousness:  Alert, and Attentive      Response to Learning: Able to verbalize current knowledge/experience, Able to   verbalize/acknowledge new learning, and Progressing to goal      Endings: None Reported      Modes of Intervention: Education, Support, Socialization, and Problem-solving    Discipline Responsible: Psychoeducational Specialist      Signature:  MAGDALENE BAEZ

## 2024-03-14 NOTE — GROUP NOTE
Group Therapy Note    Date: 3/14/2024    Group Start Time: 1100  Group End Time: 1145  Group Topic: Cognitive Skills    [facility] Deya Finney CTRS        Group Therapy Note    Attendees: 6/9     Patient's Goal: Topic: To increase social interaction, practice problem solving, making connections and communication skills.         Notes:  Pt was pleasant and cooperative, and participated fully in group task. Pt was   able to practice problem solving, making connections and communication skills.independently.   Pt performed task well.      Status After Intervention:  Improved     Participation Level: Active Listener and Interactive r/t  task and topic, supportive      Participation Quality: Appropriate, Attentive, engaged r/t task and topic, supportive     Speech:  normal .       Thought Process/Content: Logical, linear r/t task and topic . Pt demonstrates trivia knowledge from   clues at regular intervals.       Affective Functioning: Congruent, brightened with successes and positive feedback from peers and RT.       Mood: Cooperative, euthymic, pt was able to share and enjoy humor. Pt expresses and enjoyment of   group task.      Level of consciousness:  Alert, and Attentive      Response to Learning: Able to verbalize current knowledge/experience, Able to   verbalize/acknowledge new learning, and Progressing to goal      Endings: None Reported      Modes of Intervention: Education, Support, Socialization, and Problem-solving    Discipline Responsible: Psychoeducational Specialist      Signature:  MAGDALENE BAEZ

## 2024-03-15 LAB
FERRITIN SERPL-MCNC: 30 NG/ML (ref 13–150)
IRON SATN MFR SERPL: 15 % (ref 20–55)
IRON SERPL-MCNC: 39 UG/DL (ref 37–145)
TIBC SERPL-MCNC: 253 UG/DL (ref 250–450)
UNSATURATED IRON BINDING CAPACITY: 214 UG/DL (ref 112–347)

## 2024-03-15 PROCEDURE — APPSS30 APP SPLIT SHARED TIME 16-30 MINUTES

## 2024-03-15 PROCEDURE — 83540 ASSAY OF IRON: CPT

## 2024-03-15 PROCEDURE — 83550 IRON BINDING TEST: CPT

## 2024-03-15 PROCEDURE — 99232 SBSQ HOSP IP/OBS MODERATE 35: CPT | Performed by: INTERNAL MEDICINE

## 2024-03-15 PROCEDURE — 36415 COLL VENOUS BLD VENIPUNCTURE: CPT

## 2024-03-15 PROCEDURE — 6370000000 HC RX 637 (ALT 250 FOR IP): Performed by: PSYCHIATRY & NEUROLOGY

## 2024-03-15 PROCEDURE — 6370000000 HC RX 637 (ALT 250 FOR IP): Performed by: INTERNAL MEDICINE

## 2024-03-15 PROCEDURE — 99232 SBSQ HOSP IP/OBS MODERATE 35: CPT | Performed by: PSYCHIATRY & NEUROLOGY

## 2024-03-15 PROCEDURE — 1240000000 HC EMOTIONAL WELLNESS R&B

## 2024-03-15 PROCEDURE — 82728 ASSAY OF FERRITIN: CPT

## 2024-03-15 RX ADMIN — BUDESONIDE AND FORMOTEROL FUMARATE DIHYDRATE 2 PUFF: 80; 4.5 AEROSOL RESPIRATORY (INHALATION) at 20:53

## 2024-03-15 RX ADMIN — FERROUS SULFATE TAB 325 MG (65 MG ELEMENTAL FE) 325 MG: 325 (65 FE) TAB at 08:13

## 2024-03-15 RX ADMIN — HYDROXYZINE HYDROCHLORIDE 50 MG: 50 TABLET, FILM COATED ORAL at 08:13

## 2024-03-15 RX ADMIN — VORTIOXETINE 30 MG: 20 TABLET, FILM COATED ORAL at 08:12

## 2024-03-15 RX ADMIN — BUSPIRONE HYDROCHLORIDE 10 MG: 10 TABLET ORAL at 20:53

## 2024-03-15 RX ADMIN — PANTOPRAZOLE SODIUM 40 MG: 40 TABLET, DELAYED RELEASE ORAL at 08:20

## 2024-03-15 RX ADMIN — ERGOCALCIFEROL 50000 UNITS: 1.25 CAPSULE ORAL at 08:13

## 2024-03-15 RX ADMIN — TRAZODONE HYDROCHLORIDE 50 MG: 50 TABLET ORAL at 20:53

## 2024-03-15 RX ADMIN — BUSPIRONE HYDROCHLORIDE 10 MG: 10 TABLET ORAL at 08:13

## 2024-03-15 RX ADMIN — BUDESONIDE AND FORMOTEROL FUMARATE DIHYDRATE 2 PUFF: 80; 4.5 AEROSOL RESPIRATORY (INHALATION) at 08:12

## 2024-03-15 RX ADMIN — OLANZAPINE 10 MG: 10 TABLET, FILM COATED ORAL at 20:53

## 2024-03-15 RX ADMIN — BUSPIRONE HYDROCHLORIDE 10 MG: 10 TABLET ORAL at 13:52

## 2024-03-15 RX ADMIN — HYDROXYZINE HYDROCHLORIDE 50 MG: 50 TABLET, FILM COATED ORAL at 20:57

## 2024-03-15 NOTE — GROUP NOTE
Group Therapy Note    Date: 3/15/2024    Group Start Time: 0900  Group End Time: 0923  Group Topic: Community Meeting    Shelley Coreas LPN       Patient's Goal: Finding new coping mechanisms and utilize them rather than reacting off an emotion.       Status After Intervention:  Unchanged    Participation Level: Active Listener    Participation Quality: Appropriate      Speech:  normal      Thought Process/Content: Linear      Affective Functioning: Flat      Mood: anxious      Level of consciousness:  Alert      Response to Learning: Able to change behavior      Endings: None Reported    Modes of Intervention: Support      Discipline Responsible: Licensed Practical Nurse      Signature:  Shelley Rainey LPN

## 2024-03-15 NOTE — GROUP NOTE
Group Therapy Note    Date: 3/15/2024    Group Start Time: 1000  Group End Time: 1030  Group Topic: Psychotherapy    [facility]    Pepper Almonte MSW, ALONDRA        Group Therapy Note    Attendees: 5/9     Patient's Goal:  Discussion on Healthy vs Unhealthy Coping Strategies    Status After Intervention:  Improved    Participation Level: Interactive    Participation Quality: Attentive and Sharing      Speech:  hesitant      Thought Process/Content: Logical      Affective Functioning: Congruent      Mood: euthymic      Level of consciousness:  Alert      Response to Learning: Able to verbalize current knowledge/experience, Able to verbalize/acknowledge new learning, and Able to retain information      Endings: None Reported    Modes of Intervention: Education, Support, and Socialization      Discipline Responsible: /Counselor      Signature:  CUONG Gilbert, ALONDRA

## 2024-03-15 NOTE — GROUP NOTE
Group Therapy Note    Date: 3/15/2024    Group Start Time: 1430  Group End Time: 1520  Group Topic: Cognitive Skills    Deya Coppola CTRS        Group Therapy Note    Attendees: 5/9     Patient's Goal: Topic: To increase social interaction, practice decision making, and   communication skills.         Notes:  Pt was pleasant and cooperative, and participated fully in group task. Pt was   able to practice practice decision making, and communication skills..independently.   Pt performed task well.      Status After Intervention:  Improved     Participation Level: Active Listener and Interactive r/t  task and topic, supportive      Participation Quality: Appropriate, Attentive, engaged r/t task and topic, supportive     Speech:  normal .       Thought Process/Content: Logical, r/t task and topic . Pt demonstrates slight delay in   making decisions but is able to weigh up being cautious or more aggressive with scoring points.      Affective Functioning: Congruent, brightened.       Mood: Cooperative, euthymic.      Level of consciousness:  Alert, and Attentive      Response to Learning: Able to verbalize current knowledge/experience, Able to   verbalize/acknowledge new learning, and Progressing to goal      Endings: None Reported      Modes of Intervention: Education, Support, Socialization, and Problem-solving    Discipline Responsible: Psychoeducational Specialist      Signature:  MAGDALENE BAEZ

## 2024-03-15 NOTE — GROUP NOTE
Group Therapy Note    Date: 3/14/2024    Group Start Time: 2000  Group End Time: 2020  Group Topic: Relaxation    Deepika Medina, RN      Group Therapy Note    Attendees: 5/9      Patient's Goal:  To acquire coping skills by developing relaxation techniques    Notes:  Pt attended and actively participated in the Relaxation group this evening.    Status After Intervention:  Improved    Participation Level: Interactive    Participation Quality: Appropriate and Attentive    Speech:  normal    Thought Process/Content: Logical    Affective Functioning: Congruent    Mood: calm and attempting to relax    Level of consciousness:  Alert and Oriented x4    Response to Learning: Progressing to goal    Endings: None Reported    Modes of Intervention: Education, Support, and Exploration    Discipline Responsible: Registered Nurse        Signature:  Deepika Franklin, RN

## 2024-03-16 PROCEDURE — 6370000000 HC RX 637 (ALT 250 FOR IP): Performed by: INTERNAL MEDICINE

## 2024-03-16 PROCEDURE — 1240000000 HC EMOTIONAL WELLNESS R&B

## 2024-03-16 PROCEDURE — 99231 SBSQ HOSP IP/OBS SF/LOW 25: CPT | Performed by: INTERNAL MEDICINE

## 2024-03-16 PROCEDURE — 6370000000 HC RX 637 (ALT 250 FOR IP): Performed by: PSYCHIATRY & NEUROLOGY

## 2024-03-16 PROCEDURE — 99232 SBSQ HOSP IP/OBS MODERATE 35: CPT

## 2024-03-16 RX ADMIN — TRAZODONE HYDROCHLORIDE 50 MG: 50 TABLET ORAL at 20:43

## 2024-03-16 RX ADMIN — BUSPIRONE HYDROCHLORIDE 10 MG: 10 TABLET ORAL at 20:43

## 2024-03-16 RX ADMIN — BUDESONIDE AND FORMOTEROL FUMARATE DIHYDRATE 2 PUFF: 80; 4.5 AEROSOL RESPIRATORY (INHALATION) at 08:53

## 2024-03-16 RX ADMIN — HYDROXYZINE HYDROCHLORIDE 50 MG: 50 TABLET, FILM COATED ORAL at 20:43

## 2024-03-16 RX ADMIN — OLANZAPINE 10 MG: 10 TABLET, FILM COATED ORAL at 20:43

## 2024-03-16 RX ADMIN — PANTOPRAZOLE SODIUM 40 MG: 40 TABLET, DELAYED RELEASE ORAL at 08:52

## 2024-03-16 RX ADMIN — BUDESONIDE AND FORMOTEROL FUMARATE DIHYDRATE 2 PUFF: 80; 4.5 AEROSOL RESPIRATORY (INHALATION) at 20:43

## 2024-03-16 RX ADMIN — BUSPIRONE HYDROCHLORIDE 10 MG: 10 TABLET ORAL at 15:18

## 2024-03-16 RX ADMIN — BUSPIRONE HYDROCHLORIDE 10 MG: 10 TABLET ORAL at 08:52

## 2024-03-16 RX ADMIN — FERROUS SULFATE TAB 325 MG (65 MG ELEMENTAL FE) 325 MG: 325 (65 FE) TAB at 08:52

## 2024-03-16 RX ADMIN — VORTIOXETINE 30 MG: 20 TABLET, FILM COATED ORAL at 08:52

## 2024-03-17 PROCEDURE — 6370000000 HC RX 637 (ALT 250 FOR IP): Performed by: PSYCHIATRY & NEUROLOGY

## 2024-03-17 PROCEDURE — 1240000000 HC EMOTIONAL WELLNESS R&B

## 2024-03-17 PROCEDURE — 99231 SBSQ HOSP IP/OBS SF/LOW 25: CPT | Performed by: INTERNAL MEDICINE

## 2024-03-17 PROCEDURE — 6370000000 HC RX 637 (ALT 250 FOR IP): Performed by: INTERNAL MEDICINE

## 2024-03-17 PROCEDURE — 99232 SBSQ HOSP IP/OBS MODERATE 35: CPT

## 2024-03-17 RX ADMIN — FERROUS SULFATE TAB 325 MG (65 MG ELEMENTAL FE) 325 MG: 325 (65 FE) TAB at 08:49

## 2024-03-17 RX ADMIN — OLANZAPINE 10 MG: 10 TABLET, FILM COATED ORAL at 20:44

## 2024-03-17 RX ADMIN — BUSPIRONE HYDROCHLORIDE 10 MG: 10 TABLET ORAL at 14:18

## 2024-03-17 RX ADMIN — PANTOPRAZOLE SODIUM 40 MG: 40 TABLET, DELAYED RELEASE ORAL at 08:49

## 2024-03-17 RX ADMIN — BUSPIRONE HYDROCHLORIDE 10 MG: 10 TABLET ORAL at 08:49

## 2024-03-17 RX ADMIN — BUSPIRONE HYDROCHLORIDE 10 MG: 10 TABLET ORAL at 20:44

## 2024-03-17 RX ADMIN — TRAZODONE HYDROCHLORIDE 50 MG: 50 TABLET ORAL at 20:44

## 2024-03-17 RX ADMIN — BUDESONIDE AND FORMOTEROL FUMARATE DIHYDRATE 2 PUFF: 80; 4.5 AEROSOL RESPIRATORY (INHALATION) at 08:48

## 2024-03-17 RX ADMIN — BUDESONIDE AND FORMOTEROL FUMARATE DIHYDRATE 2 PUFF: 80; 4.5 AEROSOL RESPIRATORY (INHALATION) at 20:42

## 2024-03-17 RX ADMIN — HYDROXYZINE HYDROCHLORIDE 50 MG: 50 TABLET, FILM COATED ORAL at 20:45

## 2024-03-17 RX ADMIN — VORTIOXETINE 30 MG: 20 TABLET, FILM COATED ORAL at 08:49

## 2024-03-17 NOTE — GROUP NOTE
Group Therapy Note    Date: 3/17/2024    Group Start Time: 1330  Group End Time: 1415  Group Topic: Cognitive Skills    [facility]    Melissa Crabtree CTRS        Group Therapy Note    Attendees:5/12       Patient's Goal:  pt will demonstrate improved coping skills and improved interpersonal relationships    Notes:   pt was pleasant and participated well    Status After Intervention:  Improved    Participation Level: Active Listener and Interactive    Participation Quality: supportive appropriate and sharting    Speech:  normal      Thought Process/Content: Logical      Affective Functioning: Congruent      Mood: euthymic      Level of consciousness:  Alert      Response to Learning: Able to verbalize current knowledge/experience, Capable of insight, and Progressing to goal      Endings: None Reported    Modes of Intervention: Support, Socialization, and Activity      Discipline Responsible: Psychoeducational Specialist      Signature:  MAGDALENE HUDDLESTON

## 2024-03-17 NOTE — GROUP NOTE
Group Therapy Note    Date: 3/17/2024    Group Start Time: 0900  Group End Time: 0915  Group Topic: Community Meeting    Liz Henderson        Group Therapy Note    Attendees: 7/12       Patient's Goal: Stay in a positive state of mind     Status After Intervention:  Unchanged    Participation Level: Interactive    Participation Quality: Appropriate and Attentive      Speech:  normal      Thought Process/Content: Logical      Affective Functioning: Flat      Mood: anxious      Level of consciousness:  Alert and Oriented x4      Response to Learning: Able to verbalize current knowledge/experience and Able to verbalize/acknowledge new learning      Endings: None Reported    Modes of Intervention: Education and Support      Discipline Responsible: Behavorial Health Tech      Signature:  Liz Daniel

## 2024-03-17 NOTE — PROGRESS NOTES
Behavioral Services  Medicare Certification Upon Admission    I certify that this patient's inpatient psychiatric hospital admission is medically necessary for:    [x] (1) Treatment which could reasonably be expected to improve this patient's condition,       [x] (2) Or for diagnostic study;     AND     [x](2) The inpatient psychiatric services are provided while the individual is under the care of a physician and are included in the individualized plan of care.    Estimated length of stay/service 2-9 days    Plan for post-hospital care -outpatient care    Electronically signed by GEGE JEFFREY MD on 3/13/2024 at 9:10 AM      
    [facility] Internal Medicine  Refugio Powell MD; Sumanth Young MD, Reji Bethea MD, Estrella Multani MD, Abad Fairchild MD; Margarita Arnold MD    [facility] Internal Medicine   IN-PATIENT SERVICE   [facility]    Progress note            Date:   3/17/2024  Patient name:  Mae Vazquez  Date of admission:  3/12/2024  5:37 PM  MRN:   [id number]  Account:  [id number]  YOB: 1965  PCP:    Shirley Boss APRN - MARGARET  Room:   [address] [place] [address]  Code Status:    Full Code      Chief Complaint:     Suicidal /Ac Psychosis    History Obtained From:     Patient/EMR/bedside RN     History of Present Illness:   58-year-old  lady class I obese BMI 31.54 with a history of GERD and moderate persistent asthma denies any fever chills nausea vomiting denies any chest pain no dyspnea at rest no dyspnea on minimal exertion    3/17/2024  The patient denies any GI/ cardiac or respiratory concerns    Past Medical History:     Past Medical History:   Diagnosis Date    Asthma     GERD (gastroesophageal reflux disease)     Iron deficiency anemia     Schizoaffective disorder, bipolar type (HCC)         Past Surgical History:     Past Surgical History:   Procedure Laterality Date    [place] HEMIARTHROPLASTY HIP PARTIAL      Hip Replacement, Partial, left        Medications Prior to Admission:     Prior to Admission medications    Medication Sig Start Date End Date Taking? Authorizing Provider   VORTIoxetine HBr (TRINTELLIX) 10 MG TABS tablet Take 3 tablets by mouth daily 3/16/24  Yes Daryl Lopez MD   mirtazapine (REMERON) 7.5 MG tablet Take 1 tablet by mouth nightly   Yes ProviderDamian MD   OLANZapine (ZYPREXA) 10 MG tablet Take 1 tablet by mouth nightly   Yes ProviderDamian MD   busPIRone (BUSPAR) 10 MG tablet Take 1 tablet by mouth 3 times daily   Yes ProviderDamian MD   buPROPion (WELLBUTRIN XL) 150 MG extended release tablet Take 1 tablet by 
    [facility] Internal Medicine  Refugio Powell MD; Sumanth Young MD, Reji Bethea MD, Estrella Multnai MD, Abad Fairchild MD; Margarita Arnold MD    [facility] Internal Medicine   IN-PATIENT SERVICE   [facility]    Progress note            Date:   3/16/2024  Patient name:  Mae Vazquez  Date of admission:  3/12/2024  5:37 PM  MRN:   [id number]  Account:  [id number]  YOB: 1965  PCP:    Shirley Boss APRN - MARGARET  Room:   [address] [place] [address]  Code Status:    Full Code      Chief Complaint:     Suicidal /Ac Psychosis    History Obtained From:     Patient/EMR/bedside RN     History of Present Illness:   58-year-old  lady class I obese BMI 31.54 with a history of GERD and moderate persistent asthma denies any fever chills nausea vomiting denies any chest pain no dyspnea at rest no dyspnea on minimal exertion    3/16/2024  The patient denies any GI/ cardiac or respiratory concerns    Past Medical History:     Past Medical History:   Diagnosis Date    Asthma     GERD (gastroesophageal reflux disease)     Iron deficiency anemia     Schizoaffective disorder, bipolar type (HCC)         Past Surgical History:     Past Surgical History:   Procedure Laterality Date    [place] HEMIARTHROPLASTY HIP PARTIAL      Hip Replacement, Partial, left        Medications Prior to Admission:     Prior to Admission medications    Medication Sig Start Date End Date Taking? Authorizing Provider   VORTIoxetine HBr (TRINTELLIX) 10 MG TABS tablet Take 3 tablets by mouth daily 3/16/24  Yes Daryl Lopez MD   mirtazapine (REMERON) 7.5 MG tablet Take 1 tablet by mouth nightly   Yes ProviderDamian MD   OLANZapine (ZYPREXA) 10 MG tablet Take 1 tablet by mouth nightly   Yes ProviderDamian MD   busPIRone (BUSPAR) 10 MG tablet Take 1 tablet by mouth 3 times daily   Yes ProviderDamian MD   buPROPion (WELLBUTRIN XL) 150 MG extended release tablet Take 1 tablet by 
Pt is agitated as evidence by pacing, fidgeting, rapid breathing, crying, rocking. Staff attempted to find and relieve the distress by talking to pt, refocusing on new activity, offering suggestions, checking for undiagnosed pain, administer PRN medications, etc). Pt is currently continue to escalate and accepted PRN medications).     
RT ASSESSMENT TREATMENT GOALS    [x]Pt Goal: Pt will identify 1-2 positive coping skills by time of discharge.    [x]Pt Goal: Pt will identify 1-2 positive aspects of self by time of discharge.    []Pt Goal: Pt will remain on task/topic for 15-30 minutes during group by time of discharge.    []Pt Goal: Pt will identify 1-2 aspects of relapse prevention plan by time of discharge.    [x]Pt Goal: Pt will join in conversation with peers 1-2 times per group by time of discharge.    []Pt Goal: Pt will identify 1-2 new leisure interests by time of discharge.    []Pt Goal: Pt will not voice any delusional content by time of discharge.    
Received fax from [facility]. Medication list matches fill history with [facility], operated by [facility] as discharge medications were sent there to be filled (patient blister packs). Only change made was Venlafaxine IR 37.5 mg to Venlafaxine ER 37.5 mg daily.  
Spoke with Zita,  at [facility], she will send patient's discharge medication list from the time of discharge from their facility.  
(TRINTELLIX) 20 MG TABS tablet Take 1.5 tablets by mouth daily Provider, MD Damian   omeprazole (PRILOSEC) 40 MG delayed release capsule Take 1 capsule by mouth daily ProviderDamian MD   propranolol (INDERAL) 10 MG tablet Take 1 tablet by mouth 2 times daily Provider, MD Damian         Please let me know if you have any questions about this encounter. Thank you!    Electronically signed by Zainab Carmona RPH on 3/13/2024 at 11:19 AM    
burning with urination, frequency   INTEGUMENT:  negative for rash, skin lesions, easy bruising   HEMATOLOGIC/LYMPHATIC:  negative for swelling/edema   ALLERGIC/IMMUNOLOGIC:  negative for urticaria , itching  ENDOCRINE:  negative increase in drinking, increase in urination, hot or cold intolerance  MUSCULOSKELETAL:  negative joint pains, muscle aches, swelling of joints  NEUROLOGICAL:  negative for headaches, dizziness, lightheadedness, numbness, pain, tingling extremities      Physical Exam:     /70   Pulse 82   Temp 97.8 °F (36.6 °C) (Temporal)   Resp 14   Ht 1.6 m (5' 2.99\")   Wt 80.7 kg (178 lb)   LMP 2016   SpO2 96%   BMI 31.54 kg/m²   Temp (24hrs), Av.7 °F (36.5 °C), Min:97.5 °F (36.4 °C), Max:97.8 °F (36.6 °C)    No results for input(s): \"POCGLU\" in the last 72 hours.  No intake or output data in the 24 hours ending 03/15/24 1520  Class I obese BMI 31.5  General Appearance:  alert, well appearing, and in no acute distress  Mental status: oriented to person, place, and time   Head:  normocephalic, atraumatic.  Neck: supple, no carotid bruits, thyroid not palpable  Lungs: Bilateral equal air entry, clear to ausculation, no wheezing, rales or rhonchi, normal effort  Cardiovascular: normal rate, regular rhythm, no murmur, gallop, rub.  Abdomen: Soft, nontender, nondistended, normal bowel sounds, no hepatomegaly or splenomegaly  Neurologic: There are no new focal motor or sensory deficits, moving all extremities spontaneously   Skin: No gross lesions, rashes, bruising or bleeding on exposed skin area  Extremities:  peripheral pulses palpable, no pedal edema or calf pain with palpation    Investigations:      Laboratory Testing:  Recent Results (from the past 24 hour(s))   Iron and TIBC    Collection Time: 03/15/24  9:50 AM   Result Value Ref Range    Iron 39 37 - 145 ug/dL    TIBC 253 250 - 450 ug/dL    Iron % Saturation 15 (L) 20 - 55 %    UIBC 214 112 - 347 ug/dL   Ferritin    Collection 
        Opiates, Urine 03/12/2024 NEGATIVE  NEGATIVE Final    Comment:       (Positive cutoff 300 ng/mL)                  Phencyclidine, Urine 03/12/2024 NEGATIVE  NEGATIVE Final    Comment:       (Positive cutoff 25 ng/mL)                  Cannabinoid Scrn, Ur 03/12/2024 NEGATIVE  NEGATIVE Final    Comment:       (Positive cutoff 50 ng/mL)                  Oxycodone Screen, Ur 03/12/2024 NEGATIVE  NEGATIVE Final    Comment:       (Positive cutoff 100 ng/mL)                  Fentanyl, Ur 03/12/2024 NEGATIVE  NEGATIVE Final    Comment:       (Positive cutoff  5 ng/ml)            Test Information 03/12/2024 Assay provides medical screening only.  The absence of expected drug(s) and/or metabolite(s) may indicate diluted or adulterated urine, limitations of testing or timing of collection.   Final    Comment: Testing for legal purposes should be confirmed by another method.  To request confirmation   of test result, please call the lab within 7 days of sample submission.      Acetaminophen Level 03/12/2024 <5 (L)  10 - 30 ug/mL Final    Salicylate Lvl 03/12/2024 <1.0 (L)  3 - 10 mg/dL Final         Reviewed patient's current plan of care and vital signs with nursing staff.    Labs reviewed: [x] Yes    Medications  Current Facility-Administered Medications: budesonide-formoterol (SYMBICORT) 80-4.5 MCG/ACT inhaler 2 puff, 2 puff, Inhalation, BID RT  pantoprazole (PROTONIX) tablet 40 mg, 40 mg, Oral, QAM AC  vitamin D (ERGOCALCIFEROL) capsule 50,000 Units, 50,000 Units, Oral, Weekly  ferrous sulfate (IRON 325) tablet 325 mg, 325 mg, Oral, Daily with breakfast  docusate sodium (COLACE) capsule 100 mg, 100 mg, Oral, Daily PRN  OLANZapine (ZYPREXA) tablet 10 mg, 10 mg, Oral, Nightly  busPIRone (BUSPAR) tablet 10 mg, 10 mg, Oral, TID  VORTIoxetine (TRINTELLIX) tablet 30 mg, 30 mg, Oral, Daily  acetaminophen (TYLENOL) tablet 650 mg, 650 mg, Oral, Q6H PRN  ibuprofen (ADVIL;MOTRIN) tablet 400 mg, 400 mg, Oral, Q6H 
Scrn, Ur 03/12/2024 NEGATIVE  NEGATIVE Final    Comment:       (Positive cutoff 50 ng/mL)                  Oxycodone Screen, Ur 03/12/2024 NEGATIVE  NEGATIVE Final    Comment:       (Positive cutoff 100 ng/mL)                  Fentanyl, Ur 03/12/2024 NEGATIVE  NEGATIVE Final    Comment:       (Positive cutoff  5 ng/ml)            Test Information 03/12/2024 Assay provides medical screening only.  The absence of expected drug(s) and/or metabolite(s) may indicate diluted or adulterated urine, limitations of testing or timing of collection.   Final    Comment: Testing for legal purposes should be confirmed by another method.  To request confirmation   of test result, please call the lab within 7 days of sample submission.      Acetaminophen Level 03/12/2024 <5 (L)  10 - 30 ug/mL Final    Salicylate Lvl 03/12/2024 <1.0 (L)  3 - 10 mg/dL Final    Iron 03/15/2024 39  37 - 145 ug/dL Final    TIBC 03/15/2024 253  250 - 450 ug/dL Final    Iron % Saturation 03/15/2024 15 (L)  20 - 55 % Final    UIBC 03/15/2024 214  112 - 347 ug/dL Final    Ferritin 03/15/2024 30  13 - 150 ng/mL Final    Comment:       FERRITIN Reference Ranges:  Adult Males   20 - 60 years:    30 - 400 ng/mL  Adult females 17 - 60 years:    13 - 150 ng/mL  Adults greater than 60 years:   no established reference range  Pediatrics:  no established reference range           Reviewed patient's current plan of care and vital signs with nursing staff.    Labs reviewed: [x] Yes    Medications  Current Facility-Administered Medications: budesonide-formoterol (SYMBICORT) 80-4.5 MCG/ACT inhaler 2 puff, 2 puff, Inhalation, BID RT  pantoprazole (PROTONIX) tablet 40 mg, 40 mg, Oral, QAM AC  vitamin D (ERGOCALCIFEROL) capsule 50,000 Units, 50,000 Units, Oral, Weekly  ferrous sulfate (IRON 325) tablet 325 mg, 325 mg, Oral, Daily with breakfast  docusate sodium (COLACE) capsule 100 mg, 100 mg, Oral, Daily PRN  OLANZapine (ZYPREXA) tablet 10 mg, 10 mg, Oral, 
PRN  OLANZapine (ZYPREXA) tablet 10 mg, 10 mg, Oral, Nightly  busPIRone (BUSPAR) tablet 10 mg, 10 mg, Oral, TID  VORTIoxetine (TRINTELLIX) tablet 30 mg, 30 mg, Oral, Daily  acetaminophen (TYLENOL) tablet 650 mg, 650 mg, Oral, Q6H PRN  ibuprofen (ADVIL;MOTRIN) tablet 400 mg, 400 mg, Oral, Q6H PRN  hydrOXYzine HCl (ATARAX) tablet 50 mg, 50 mg, Oral, TID PRN  traZODone (DESYREL) tablet 50 mg, 50 mg, Oral, Nightly PRN  polyethylene glycol (GLYCOLAX) packet 17 g, 17 g, Oral, Daily PRN  aluminum & magnesium hydroxide-simethicone (MAALOX) 200-200-20 MG/5ML suspension 30 mL, 30 mL, Oral, Q6H PRN  haloperidol lactate (HALDOL) injection 5 mg, 5 mg, IntraMUSCular, Q6H PRN **AND** diphenhydrAMINE (BENADRYL) injection 50 mg, 50 mg, IntraMUSCular, Q6H PRN  haloperidol (HALDOL) tablet 5 mg, 5 mg, Oral, Q6H PRN  nicotine polacrilex (COMMIT) lozenge 2 mg, 2 mg, Oral, Q2H PRN    ASSESSMENT  MDD (major depressive disorder), recurrent severe, without psychosis (HCC)         PATIENT HANDOFF  Patient symptoms are: Unstable  Monitor need and frequency of PRN medications.  Encourage participation in groups and milieu.  Medication changes and discharge planning per attending  Follow-up daily while inpatient.     Patient continues to be monitored in the inpatient psychiatric facility at [facility] for safety and stabilization. Patient continues to need, on a daily basis, active treatment furnished directly by or requiring the supervision of inpatient psychiatric personnel.    Electronically signed by NORM Crisostomo CNP on 3/14/2024 at 11:47 AM    **This report has been created using voice recognition software. It may contain minor errors which are inherent in voice recognition technology.**   I independently saw and evaluated the patient.  I reviewed the  documentation above    .  Any additional comments or changes to the   documentation are stated below otherwise agree with assessment.      QTc Calculation (Bazett)   Date Value Ref 
Q6H PRN  ibuprofen (ADVIL;MOTRIN) tablet 400 mg, 400 mg, Oral, Q6H PRN  hydrOXYzine HCl (ATARAX) tablet 50 mg, 50 mg, Oral, TID PRN  traZODone (DESYREL) tablet 50 mg, 50 mg, Oral, Nightly PRN  polyethylene glycol (GLYCOLAX) packet 17 g, 17 g, Oral, Daily PRN  aluminum & magnesium hydroxide-simethicone (MAALOX) 200-200-20 MG/5ML suspension 30 mL, 30 mL, Oral, Q6H PRN  haloperidol lactate (HALDOL) injection 5 mg, 5 mg, IntraMUSCular, Q6H PRN **AND** diphenhydrAMINE (BENADRYL) injection 50 mg, 50 mg, IntraMUSCular, Q6H PRN  haloperidol (HALDOL) tablet 5 mg, 5 mg, Oral, Q6H PRN  nicotine polacrilex (COMMIT) lozenge 2 mg, 2 mg, Oral, Q2H PRN    ASSESSMENT  MDD (major depressive disorder), recurrent severe, without psychosis (HCC)         Plan  Patient symptoms are: Showing improvement  No changes to meds at this time  Monitor need and frequency of PRN medications.  Encourage participation in groups and milieu.  Medication changes and discharge planning per attending  Follow-up daily while inpatient.     Patient continues to be monitored in the inpatient psychiatric facility at [facility] for safety and stabilization. Patient continues to need, on a daily basis, active treatment furnished directly by or requiring the supervision of inpatient psychiatric personnel.    Electronically signed by NORM Mckeon CNP on 3/16/2024 at 2:59 PM    **This report has been created using voice recognition software. It may contain minor errors which are inherent in voice recognition technology.**

## 2024-03-18 VITALS
DIASTOLIC BLOOD PRESSURE: 68 MMHG | HEART RATE: 82 BPM | OXYGEN SATURATION: 96 % | BODY MASS INDEX: 31.54 KG/M2 | SYSTOLIC BLOOD PRESSURE: 116 MMHG | HEIGHT: 63 IN | WEIGHT: 178 LBS | TEMPERATURE: 96.9 F | RESPIRATION RATE: 14 BRPM

## 2024-03-18 PROCEDURE — 6370000000 HC RX 637 (ALT 250 FOR IP): Performed by: PSYCHIATRY & NEUROLOGY

## 2024-03-18 PROCEDURE — 6370000000 HC RX 637 (ALT 250 FOR IP): Performed by: INTERNAL MEDICINE

## 2024-03-18 PROCEDURE — 99239 HOSP IP/OBS DSCHRG MGMT >30: CPT | Performed by: PSYCHIATRY & NEUROLOGY

## 2024-03-18 RX ORDER — OMEPRAZOLE 40 MG/1
40 CAPSULE, DELAYED RELEASE ORAL DAILY
Qty: 30 CAPSULE | Refills: 0 | Status: SHIPPED | OUTPATIENT
Start: 2024-03-18

## 2024-03-18 RX ORDER — HYDROXYZINE 50 MG/1
50 TABLET, FILM COATED ORAL 3 TIMES DAILY PRN
Qty: 30 TABLET | Refills: 0 | Status: SHIPPED | OUTPATIENT
Start: 2024-03-18 | End: 2024-03-28

## 2024-03-18 RX ORDER — TRAZODONE HYDROCHLORIDE 50 MG/1
50 TABLET ORAL NIGHTLY PRN
Qty: 30 TABLET | Refills: 0 | Status: SHIPPED | OUTPATIENT
Start: 2024-03-18

## 2024-03-18 RX ADMIN — VORTIOXETINE 30 MG: 20 TABLET, FILM COATED ORAL at 08:09

## 2024-03-18 RX ADMIN — PANTOPRAZOLE SODIUM 40 MG: 40 TABLET, DELAYED RELEASE ORAL at 08:09

## 2024-03-18 RX ADMIN — BUSPIRONE HYDROCHLORIDE 10 MG: 10 TABLET ORAL at 08:09

## 2024-03-18 RX ADMIN — BUDESONIDE AND FORMOTEROL FUMARATE DIHYDRATE 2 PUFF: 80; 4.5 AEROSOL RESPIRATORY (INHALATION) at 08:09

## 2024-03-18 RX ADMIN — BUSPIRONE HYDROCHLORIDE 10 MG: 10 TABLET ORAL at 14:50

## 2024-03-18 RX ADMIN — FERROUS SULFATE TAB 325 MG (65 MG ELEMENTAL FE) 325 MG: 325 (65 FE) TAB at 08:09

## 2024-03-18 NOTE — GROUP NOTE
Group Therapy Note    Date: 3/18/2024    Group Start Time: 1050  Group End Time: 1130  Group Topic: Cognitive Skills    [facility]    Melissa Crabtree [facility]        Group Therapy Note    Attendees: 6/13       Patient's Goal:  pt will demonstrate improved coping skills and improved leisure awaresness    Notes:   pt was pleasant and participated well    Status After Intervention:  Improved    Participation Level: Active Listener and Interactive    Participation Quality: Appropriate, Sharing, and Supportive      Speech:  normal      Thought Process/Content: slow to process      Affective Functioning: Congruent      Mood: euthymic      Level of consciousness:  Alert      Response to Learning: Able to verbalize current knowledge/experience, Capable of insight, and Progressing to goal      Endings: None Reported    Modes of Intervention: Education, Support, and Activity      Discipline Responsible: Psychoeducational Specialist      Signature:  MAGDALENE HUDDLESTON

## 2024-03-18 NOTE — DISCHARGE INSTRUCTIONS
Information:  Medications:   Medication summary provided   I understand that I should take only the medications on my list.     -why and when I need to take each medicine.     -which side effects to watch for.     -that I should carry my medication information at all times in case of     Emergency situations.    I will take all of my medicines to follow up appointments.     -check with my physician or pharmacist before taking any new    Medication, over the counter product or drink alcohol.    -Ask about food, drug or dietary supplement interactions.    -discard old lists and update records with medication providers.    Notify Physician:  Notify physician if you notice:   Always call 911 if you feel your life is in danger  In case of an emergency call 911 immediately!  If 911 is not available call your local emergency medical system for help    Behavioral Health Follow Up:  Original Referral Source:er  Discharge Diagnosis: Depression with suicidal ideation [F32.A, R45.851]  Recommendations for Level of Care: follow up  Patient status at discharge: stable  My hospital  was: [facility]  Aftercare plan faxed: [facility]   -faxed by: writer   -date: 3/18/2024     -time: 1500  Prescriptions: escribed to [facility]    Smoking: Quit Smoking.   Call the [employer]'s smoking quitline at 6-576-97F-QUIT  Know the signs of a heart attack   If you have any of the following symptoms call 911 immediately, do not wait more    Than five minutes.    1. Pressure, fullness and/ or squeezing in the center of the chest spreading to    The jaw, neck or shoulder.    2. Chest discomfort with light headedness, fainting, sweating, nausea or    Shortness of breath.   3. Upper abdominal pressure or discomfort.   4. Lower chest pain, back pain, unusual fatigue, shortness of breath, nausea   Or dizziness.     General Information:   Questions regarding your bill: Call HELP program [phone]     Suicide Hotline ([facility] Crisis Care Line)

## 2024-03-18 NOTE — PLAN OF CARE
Problem: Anxiety  Goal: Will report anxiety at manageable levels  Description: INTERVENTIONS:  1. Administer medication as ordered  2. Teach and rehearse alternative coping skills  3. Provide emotional support with 1:1 interaction with staff  3/13/2024 1518 by Cynthia Lopez, RN  Outcome: Progressing   1:1 with pt x ten minutes.  Pt encouraged to attend unit programming and interact with peers and staff.  Pt also encouraged to tend to hygiene and ADLs.  Pt encouraged to discuss feelings with staff and feedback and reassurance provided.    Pt denies thoughts of self harm and is agreeable to seeking out should thoughts of self harm arise.  Safe environment maintained.  Q15 minute checks for safety cont per unit policy.  Will cont to monitor for safety and provides support and reassurance as needed.    Pt is anxious and preoccupied with own thoughts. Medicated as ordered. Tearful and crying in room and dayroom. Needs frequent redirection.   
  Problem: Anxiety  Goal: Will report anxiety at manageable levels  Description: INTERVENTIONS:  1. Administer medication as ordered  2. Teach and rehearse alternative coping skills  3. Provide emotional support with 1:1 interaction with staff  3/13/2024 2215 by Jessica Mccarty LPN  Outcome: Progressing  Flowsheets (Taken 3/13/2024 2210)  Will report anxiety at manageable levels:   Administer medication as ordered   Teach and rehearse alternative coping skills   Provide emotional support with 1:1 interaction with staff  Note: Patient compliant with medication regimen, states that anxiety is still high, but better than previous reports.  Encouraged patient to ask for as needed medications if she feels they are needed.  Patient verbalized understanding.     Problem: Safety - Adult  Goal: Free from fall injury  3/13/2024 2215 by Jessica Mccarty LPN  Outcome: Progressing  Flowsheets (Taken 3/13/2024 2215)  Free From Fall Injury:   Instruct family/caregiver on patient safety   Based on caregiver fall risk screen, instruct family/caregiver to ask for assistance with transferring infant if caregiver noted to have fall risk factors  Note: Patient remains free from falls, and self injury during this admission.  Will continue to monitor.  15 minute checks, and gripper socks in place.     Problem: Self Harm/Suicidality  Goal: Will have no self-injury during hospital stay  Description: INTERVENTIONS:  1.  Ensure constant observer at bedside with Q15M safety checks  2.  Maintain a safe environment  3.  Secure patient belongings  4.  Ensure family/visitors adhere to safety recommendations  5.  Ensure safety tray has been added to patient's diet order  6.  Every shift and PRN: Re-assess suicidal risk via Frequent Screener    3/13/2024 2215 by Jessica Mccarty LPN  Outcome: Progressing  Flowsheets (Taken 3/13/2024 2210)  Will have no self-injury during hospital stay:   Ensure constant observer at bedside with Q15M safety checks   
  Problem: Anxiety  Goal: Will report anxiety at manageable levels  Description: INTERVENTIONS:  1. Administer medication as ordered  2. Teach and rehearse alternative coping skills  3. Provide emotional support with 1:1 interaction with staff  3/18/2024 0328 by Cristin Cortez LPN  Outcome: Progressing  Flowsheets (Taken 3/14/2024 2141 by Jessica Mccarty LPN)  Will report anxiety at manageable levels:   Administer medication as ordered   Teach and rehearse alternative coping skills   Provide emotional support with 1:1 interaction with staff  Note: Patient remains free from harm to self or others but admits to continued anxiety and depression. Medications available upon request. Staff ensures safety by providing safety checks on the unit intermittently and every 15 minutes. Staff continues to provide a safe environment. Staff encouraged patient to notify if depression continues.      Problem: Safety - Adult  Goal: Free from fall injury  3/18/2024 0328 by Cristin Cortez LPN  Outcome: Progressing  Flowsheets (Taken 3/13/2024 2215 by Jessica Mccarty LPN)  Free From Fall Injury:   Instruct family/caregiver on patient safety   Based on caregiver fall risk screen, instruct family/caregiver to ask for assistance with transferring infant if caregiver noted to have fall risk factors  Note: Patient remains free from falls on the unit at this time. Patient has gripper socks on for fall prevention. Staff ensures safety by providing safety checks on the unit intermittently and every 15 minutes. Staff continues to provide a safe environment.      Problem: Pain  Goal: Verbalizes/displays adequate comfort level or baseline comfort level  3/18/2024 0328 by Cristin Cortez LPN  Outcome: Progressing  Flowsheets (Taken 3/14/2024 2143 by Jessica Mccraty LPN)  Verbalizes/displays adequate comfort level or baseline comfort level:   Encourage patient to monitor pain and request assistance   Administer analgesics based on type and 
  Problem: Anxiety  Goal: Will report anxiety at manageable levels  Description: INTERVENTIONS:  1. Administer medication as ordered  2. Teach and rehearse alternative coping skills  3. Provide emotional support with 1:1 interaction with staff  Note: Ms. Vazquez adheres to her prescribed medication as prescribed. She continues to demonstrate anxiety during interactions, but reports feeling much better and is hopeful for discharge tomorrow. Ms. Vazquez comes out for meals and communicates needs. She is isolative to self and naps throughout the day.      Problem: Safety - Adult  Goal: Free from fall injury  Note: Ms. Vazquez has remained free of falls during this shift.      Problem: Self Harm/Suicidality  Goal: Will have no self-injury during hospital stay  Description: INTERVENTIONS:  1.  Ensure constant observer at bedside with Q15M safety checks  2.  Maintain a safe environment  3.  Secure patient belongings  4.  Ensure family/visitors adhere to safety recommendations  5.  Ensure safety tray has been added to patient's diet order  6.  Every shift and PRN: Re-assess suicidal risk via Frequent Screener    Note: Ms. Vazquez denies suicidal ideation and thoughts of harm to self and others.      Problem: Pain  Goal: Verbalizes/displays adequate comfort level or baseline comfort level  Note: Ms. Vazquez has denied pain and discomfort during this shift.      
  Problem: Anxiety  Goal: Will report anxiety at manageable levels  Description: INTERVENTIONS:  1. Administer medication as ordered  2. Teach and rehearse alternative coping skills  3. Provide emotional support with 1:1 interaction with staff  Outcome: Progressing  Flowsheets (Taken 3/14/2024 2141)  Will report anxiety at manageable levels:   Administer medication as ordered   Teach and rehearse alternative coping skills   Provide emotional support with 1:1 interaction with staff  Note: Patient remains with anxiety level of 7/10.  Adminstered anxiolytics as ordered, and encouraged patient to verbalize if anxiety remained high for administration of additional interventions.  Patient compliant with behavioral medications.     Problem: Safety - Adult  Goal: Free from fall injury  3/14/2024 2143 by Jessica Mccarty LPN  Outcome: Progressing  Flowsheets (Taken 3/13/2024 2215)  Free From Fall Injury:   Instruct family/caregiver on patient safety   Based on caregiver fall risk screen, instruct family/caregiver to ask for assistance with transferring infant if caregiver noted to have fall risk factors  Note: Patient remains free from falls this hospitalization.  Gripper socks on, close supervision with ADLs provided.      Problem: Self Harm/Suicidality  Goal: Will have no self-injury during hospital stay  Description: INTERVENTIONS:  1.  Ensure constant observer at bedside with Q15M safety checks  2.  Maintain a safe environment  3.  Secure patient belongings  4.  Ensure family/visitors adhere to safety recommendations  5.  Ensure safety tray has been added to patient's diet order  6.  Every shift and PRN: Re-assess suicidal risk via Frequent Screener    Outcome: Progressing  Flowsheets (Taken 3/14/2024 2141)  Will have no self-injury during hospital stay:   Ensure constant observer at bedside with Q15M safety checks   Maintain a safe environment   Secure patient belongings   Every shift and PRN: Re-assess suicidal risk via 
  Problem: Risk for Elopement  Goal: Patient will not exit the unit/facility without proper excort  Outcome: Completed  Note: Ms. Vazquez has not demonstrated elopement risk behaviors on this shift.      Problem: Anxiety  Goal: Will report anxiety at manageable levels  Description: INTERVENTIONS:  1. Administer medication as ordered  2. Teach and rehearse alternative coping skills  3. Provide emotional support with 1:1 interaction with staff  Note: Ms. Vazquez is anxious yet pleasant during interactions. She has brightened and smiles and is friendly and able to make her needs known and make requests without whining and becoming tearful. She adheres to her medication as prescribed. She comes out to the day area for meals and for brief periods throughout the shift to watch tv. Writer encouraged general hygiene and a shower, and she declined. Ms. Vazquez reports feeling \"better\" and voices numerous times during this shift that she is hopeful for discharge.      Problem: Safety - Adult  Goal: Free from fall injury  Note: Ms. Vazquez has remained free of falls during this shift.      Problem: Self Harm/Suicidality  Goal: Will have no self-injury during hospital stay  Description: INTERVENTIONS:  1.  Ensure constant observer at bedside with Q15M safety checks  2.  Maintain a safe environment  3.  Secure patient belongings  4.  Ensure family/visitors adhere to safety recommendations  5.  Ensure safety tray has been added to patient's diet order  6.  Every shift and PRN: Re-assess suicidal risk via Frequent Screener    Note: Ms. Vazquez has remained free of self harm     
  Problem: Risk for Elopement  Goal: Patient will not exit the unit/facility without proper excort  Outcome: Progressing  Flowsheets (Taken 3/16/2024 0336)  Nursing Interventions for Elopement Risk:   Assist with personal care needs such as toileting, eating, dressing, as needed to reduce the risk of wandering   Make sure patient has all necessary personal care items   Shoes and clothing collected and placed in gown attire   Escort with two staff members if patient must leave the unit  Note: Patient is absent of exit seeking behaviors. Patient remains free from doors and exits. Staff maintains Q15 minute safety checks.      Problem: Pain  Goal: Verbalizes/displays adequate comfort level or baseline comfort level  Outcome: Progressing  Flowsheets (Taken 3/14/2024 2143 by Jessica Mccarty LPN)  Verbalizes/displays adequate comfort level or baseline comfort level:   Encourage patient to monitor pain and request assistance   Administer analgesics based on type and severity of pain and evaluate response   Consider cultural and social influences on pain and pain management   Assess pain using appropriate pain scale   Implement non-pharmacological measures as appropriate and evaluate response  Note: Patient continues to have no pain. Medications available upon request. Patient stated they will notify staff of continued or resolved pain. Staff continues to monitor patient. Patient has tried and failed multiple non-pharmalogical resolutions with much success including but not limited to: rest, exercise, socializing, walking and coloring.      Problem: Anxiety  Goal: Will report anxiety at manageable levels  Description: INTERVENTIONS:  1. Administer medication as ordered  2. Teach and rehearse alternative coping skills  3. Provide emotional support with 1:1 interaction with staff  Outcome: Not Progressing  Flowsheets (Taken 3/14/2024 2141 by Jessica Mccarty LPN)  Will report anxiety at manageable levels:   Administer 
  Problem: Safety - Adult  Goal: Free from fall injury  3/17/2024 0110 by Cristin Cortez LPN  Outcome: Progressing  Flowsheets (Taken 3/13/2024 2215 by Jessica Mccarty LPN)  Free From Fall Injury:   Instruct family/caregiver on patient safety   Based on caregiver fall risk screen, instruct family/caregiver to ask for assistance with transferring infant if caregiver noted to have fall risk factors  Note: Pt remains free of falls and verbalizes understanding of individual fall risks.  Pt wearing non skid footwear and encouraged to seek out staff for any assistance needed. Staff ensures safety by providing safety checks on the unit intermittently and every 15 minutes. Staff continues to provide a safe environment.      Problem: Self Harm/Suicidality  Goal: Will have no self-injury during hospital stay  Description: INTERVENTIONS:  1.  Ensure constant observer at bedside with Q15M safety checks  2.  Maintain a safe environment  3.  Secure patient belongings  4.  Ensure family/visitors adhere to safety recommendations  5.  Ensure safety tray has been added to patient's diet order  6.  Every shift and PRN: Re-assess suicidal risk via Frequent Screener    3/17/2024 0110 by Cristin Cortez LPN  Outcome: Progressing  Flowsheets (Taken 3/14/2024 2141 by Jessica Mccarty LPN)  Will have no self-injury during hospital stay:   Ensure constant observer at bedside with Q15M safety checks   Maintain a safe environment   Secure patient belongings   Every shift and PRN: Re-assess suicidal risk via Frequent Screener  Note: Patient denies thoughts of self harm or harm to others during this shift. Staff encouraged patient to notify staff if thoughts of self harm or harm to others occur. Staff ensures patient safety by intermediate and safety checks every 15 minutes.       Problem: Anxiety  Goal: Will report anxiety at manageable levels  Description: INTERVENTIONS:  1. Administer medication as ordered  2. Teach and rehearse alternative 
  Problem: Safety - Adult  Goal: Free from fall injury  Outcome: Progressing  Note: Pt remains free of falls.     Problem: Pain  Goal: Verbalizes/displays adequate comfort level or baseline comfort level  Outcome: Progressing  Note: Pt denies having suicidal or homicidal ideations. Pt reports having anxiety and depression. Pt denies having hallucinations and has not been seen responding to internal stimulus. Pt is pleasant and cooperative with staff and compliant with medical treatment. Pt is encouraged to care for her ADLs.      
[facility]  Day 3 Interdisciplinary Treatment Plan NOTE    Review Date & Time: 3/15/2024   1245    Admission Type:   Admission Type: Involuntary    Reason for admission:  Reason for Admission: Patient reports increased depression with a plan to OD on her home medications due to stressful living sitation/ abussive niece  Estimated Length of Stay: 5-7 days  Estimated Discharge Date Update: to be determined by physician    PATIENT STRENGTHS:  Patient Strengths    Patient Strengths and Limitations:Limitations: Difficult relationships / poor social skills, Multiple barriers to leisure interests, Difficulty problem solving/relies on others to help solve problems, External locus of control (History of learning disability, can read but is slower to process and has difficulty with comprehension of multiple steps/long lists of information/directions.)  Addictive Behavior:Addictive Behavior  In the Past 3 Months, Have You Felt or Has Someone Told You That You Have a Problem With  : None  Medical Problems:  Past Medical History:   Diagnosis Date    Asthma     GERD (gastroesophageal reflux disease)     Iron deficiency anemia     Schizoaffective disorder, bipolar type (HCC)        Risk:  Fall Risk   Chandler Scale Chandler Scale Score: 22  BVC    Change in scores no Changes to plan of Care no    Status EXAM:   Mental Status and Behavioral Exam  Normal: No  Level of Assistance: Independent/Self  Facial Expression: Exaggerated, Sad, Worried  Affect: Blunt, Incongruent  Level of Consciousness: Alert  Frequency of Checks: 4 times per hour, close  Mood:Normal: No  Mood: Depressed, Anxious, Worthless, low self-esteem, Despair, Terrified  Motor Activity:Normal: No  Motor Activity: Decreased  Eye Contact: Fair  Observed Behavior: Preoccupied, Cooperative, Tearful, Friendly  Sexual Misconduct History: Current - no  Preception: [place] to person, [place] to time, [place] to place, [place] to situation  Attention:Normal: 
[facility]  Initial Interdisciplinary Treatment Plan NO      Original treatment plan Date & Time: 3/13/2024   12:34 pm    Admission Type:  Admission Type: Involuntary    Reason for admission:   Reason for Admission: Patient reports increased depression with a plan to OD on her home medications due to stressful living sitation/ abussive niece    Estimated Length of Stay:  5-7days  Estimated Discharge Date: to be determined by physician    PATIENT STRENGTHS:  Patient Strengths:   Patient Strengths and Limitations:Limitations: Difficult relationships / poor social skills, Multiple barriers to leisure interests, Difficulty problem solving/relies on others to help solve problems, External locus of control  Addictive Behavior: Addictive Behavior  In the Past 3 Months, Have You Felt or Has Someone Told You That You Have a Problem With  : None  Medical Problems:  Past Medical History:   Diagnosis Date    Asthma     GERD (gastroesophageal reflux disease)     Iron deficiency anemia     Schizoaffective disorder, bipolar type (HCC)      Status EXAM:Mental Status and Behavioral Exam  Normal: No  Level of Assistance: Independent/Self  Facial Expression: Sad, Exaggerated  Affect: Unstable  Level of Consciousness: Alert  Frequency of Checks: 4 times per hour, close  Mood:Normal: No  Mood: Depressed, Anxious, Sad, Helpless  Motor Activity:Normal: Yes  Eye Contact: Poor  Observed Behavior: Preoccupied, Tearful  Sexual Misconduct History: Current - no  Preception: [place] to person, [place] to time, [place] to place  Attention:Normal: No  Attention: Unable to concentrate  Thought Processes: Unremarkable  Thought Content:Normal: No  Thought Content: Preoccupations  Depression Symptoms: Crying, Feelings of helplessness, Feelings of hopelessess, Feelings of worthlessness, Impaired concentration, Loss of interest  Anxiety Symptoms: Unexplained fears, Generalized  [place] Symptoms: Pressured speech, Poor 
Screener    3/15/2024 1026 by Shelley Rainey LPN  Outcome: Progressing  3/14/2024 2143 by Jessica Mccarty LPN  Outcome: Progressing  Flowsheets (Taken 3/14/2024 2141)  Will have no self-injury during hospital stay:   Ensure constant observer at bedside with Q15M safety checks   Maintain a safe environment   Secure patient belongings   Every shift and PRN: Re-assess suicidal risk via Frequent Screener  Note: Patient does not endorse thoughts of self harm or suicidal ideation at this time.     Problem: Pain  Goal: Verbalizes/displays adequate comfort level or baseline comfort level  3/15/2024 1026 by Shelley Rainey LPN  Outcome: Progressing  3/14/2024 2143 by Jessica Mccarty LPN  Outcome: Progressing  Flowsheets (Taken 3/14/2024 2143)  Verbalizes/displays adequate comfort level or baseline comfort level:   Encourage patient to monitor pain and request assistance   Administer analgesics based on type and severity of pain and evaluate response   Consider cultural and social influences on pain and pain management   Assess pain using appropriate pain scale   Implement non-pharmacological measures as appropriate and evaluate response  Note: Discussed available analgesic medications available to patient, and encouraged patient to verbalize physical pain if present.  Patient denies physical pain, and need for analgesics at this time.     Patient reports depression and anxiety but denies suicidal ideation but agrees to notify staff if she has any thoughts to harm self. Patient is free from fall at this time , staff will continue to monitor throughout shift. Patient denies pain and seems to be in no distress. Patient shows no risk of elopement at this time. Patient is isolative , sad, flat, attending groups, pleasant, tearful , medication compliant , behavior controled , and focused on placement once discharge .

## 2024-03-18 NOTE — DISCHARGE SUMMARY
hydrOXYzine HCl (ATARAX) 50 MG tablet Take 1 tablet by mouth 3 times daily as needed for Anxiety, Disp-30 tablet, R-0Normal      traZODone (DESYREL) 50 MG tablet Take 1 tablet by mouth nightly as needed for Sleep, Disp-30 tablet, R-0Normal           CONTINUE these medications which have CHANGED    Details   omeprazole (PRILOSEC) 40 MG delayed release capsule Take 1 capsule by mouth daily, Disp-30 capsule, R-0Normal      VORTIoxetine (TRINTELLIX) 10 MG TABS tablet Take 3 tablets by mouth daily, Disp-90 tablet, R-0Normal           CONTINUE these medications which have NOT CHANGED    Details   OLANZapine (ZYPREXA) 10 MG tablet Take 1 tablet by mouth nightlyHistorical Med      busPIRone (BUSPAR) 10 MG tablet Take 1 tablet by mouth 3 times dailyHistorical Med      docusate sodium (COLACE) 100 MG capsule Take 1 capsule by mouth daily as needed for ConstipationHistorical Med      fluticasone furoate-vilanterol (BREO ELLIPTA) 200-25 MCG/ACT AEPB inhaler Inhale 1 puff into the lungs dailyHistorical Med      ferrous sulfate (IRON 325) 325 (65 Fe) MG tablet Take 1 tablet by mouth daily (with breakfast)Historical Med      Cholecalciferol (VITAMIN D3) 1.25 MG (79307 UT) CAPS Take 1 capsule by mouth once a week SundaysHistorical Med           STOP taking these medications       venlafaxine (EFFEXOR XR) 37.5 MG extended release capsule Comments:   Reason for Stopping:         mirtazapine (REMERON) 7.5 MG tablet Comments:   Reason for Stopping:         buPROPion (WELLBUTRIN XL) 150 MG extended release tablet Comments:   Reason for Stopping:         propranolol (INDERAL) 10 MG tablet Comments:   Reason for Stopping:                Core Measures statement:   Not applicable    Discharge Exam:  Level of consciousness:  Within normal limits  Appearance: Street clothes, seated, with good grooming  Behavior/Motor: No abnormalities noted  Attitude toward examiner:  Cooperative, attentive, good eye contact  Speech:  spontaneous, normal

## 2024-03-18 NOTE — GROUP NOTE
Group Therapy Note    Date: 3/18/2024    Group Start Time: 1000  Group End Time: 1036  Group Topic: Psychotherapy    [facility]    Pepper Almonte MSW, ALONDRA        Group Therapy Note    Attendees: 5/13       Patient's Goal:  Recognizing symptoms of Anxiety, the types of Anxiety and treatments.      Status After Intervention:  Improved    Participation Level: Active Listener and Interactive    Participation Quality: Appropriate, Attentive, and Sharing      Speech:  normal      Thought Process/Content: Logical      Affective Functioning: Congruent      Mood: euthymic      Level of consciousness:  Alert, Oriented x4, and Attentive      Response to Learning: Able to verbalize current knowledge/experience, Able to verbalize/acknowledge new learning, and Able to retain information      Endings: None Reported    Modes of Intervention: Education, Support, and Socialization      Discipline Responsible: /Counselor      Signature:  CUONG Gilbert LSW

## 2024-03-18 NOTE — TRANSITION OF CARE
0.10 0.0 - 0.2 k/uL   Comprehensive Metabolic Panel   Result Value Ref Range    Sodium 140 135 - 144 mmol/L    Potassium 3.7 3.7 - 5.3 mmol/L    Chloride 101 98 - 107 mmol/L    CO2 24 20 - 31 mmol/L    Anion Gap 15 9 - 17 mmol/L    Glucose 121 (H) 70 - 99 mg/dL    BUN 13 6 - 20 mg/dL    Creatinine 0.7 0.5 - 0.9 mg/dL    Est, Glom Filt Rate >60 >60 mL/min/1.73m2    Calcium 9.8 8.6 - 10.4 mg/dL    Total Protein 7.3 6.4 - 8.3 g/dL    Albumin 4.1 3.5 - 5.2 g/dL    Total Bilirubin 0.3 0.3 - 1.2 mg/dL    Alkaline Phosphatase 86 35 - 104 U/L    ALT 8 5 - 33 U/L    AST 13 <32 U/L   Ethanol   Result Value Ref Range    Ethanol <10 <10 mg/dL    Ethanol percent <0.010 %   Drug screen multi urine   Result Value Ref Range    Amphetamine Screen, Ur NEGATIVE NEGATIVE    Barbiturate Screen, Ur NEGATIVE NEGATIVE    Benzodiazepine Screen, Urine NEGATIVE NEGATIVE    Cocaine Metabolite, Urine NEGATIVE NEGATIVE    Methadone Screen, Urine POSITIVE (A) NEGATIVE    Opiates, Urine NEGATIVE NEGATIVE    Phencyclidine, Urine NEGATIVE NEGATIVE    Cannabinoid Scrn, Ur NEGATIVE NEGATIVE    Oxycodone Screen, Ur NEGATIVE NEGATIVE    Fentanyl, Ur NEGATIVE NEGATIVE    Test Information       Assay provides medical screening only.  The absence of expected drug(s) and/or metabolite(s) may indicate diluted or adulterated urine, limitations of testing or timing of collection.   Acetaminophen level   Result Value Ref Range    Acetaminophen Level <5 (L) 10 - 30 ug/mL   Salicylate   Result Value Ref Range    Salicylate Lvl <1.0 (L) 3 - 10 mg/dL   Iron and TIBC   Result Value Ref Range    Iron 39 37 - 145 ug/dL    TIBC 253 250 - 450 ug/dL    Iron % Saturation 15 (L) 20 - 55 %    UIBC 214 112 - 347 ug/dL   Ferritin   Result Value Ref Range    Ferritin 30 13 - 150 ng/mL       Immunizations administered during this encounter:   There is no immunization history on file for this patient.  Documentation of patient's or caregiver's refusal of influenza

## 2024-03-18 NOTE — BH NOTE
Emergency Medication Follow-Up Note:    PRN medication of Halodol 5mg oral  was effective as evidence by patient redirectable and resting in room. Patient denies medication side effects. Will continue to monitor and provide support as needed.    
Emergency PRN Medication Administration Note:      Patient is Agitated as evidence by patient yelling/ inconsolable crying, disturbing the milieu, and non- redirectable.  Staff attempted to find and relieve the distress by Talking to patient, Refocusing on new activity, and Offering suggestions Patient accepted oral medications resting in room. Medication Administered as prescribed: Haldol 5mg oral Patient Tolerated medication administration.   Will continue to monitor, offer support, and reassess.   
Faxed JOCELYNE to [facility] [phone] per their request to release medication list to [facility] Pharmacist.   
On call provider,  notified of best practice advisory suggesting to place patient on suicide precautions. Provider to discontinue the order as patient does not meet criteria for suicide precautions at this time. Continue to observe patient on every 15 minute checks.   
Patient admitted to depression and anxiety this shift. Patient stated she feels improvement since admission. Patient came out to the day room for a short period to color but remained isolative to self. Patient denied thoughts of harm to self or others upon time of assessment. Patient remained flat emotionally but friendly and cooperative. Staff educated patient on the benefits of her daily medication as well as participating in daily activities with peers. Patient voiced understanding. Patient was medication compliant and behavioral controlled throughout the shift.   
Patient identified as a non smoker, no further education required at this time.   
Patient out in day room for evening wrap up group and snack. Patient remains isolative to self in day room.  Patient continues to have flat effect. Patient remains medication compliant and maintains an appropriate behavior with all on the unit at this time. Patient continues to be anxious about being discharged. Patient denies any thoughts of harm to herself or others at this time. Patient states she has had increased positive thoughts since admission to the [facility].   
Pt is a non smoker.   
information about quitting (benefits of quitting, techniques in how to quit, available resources  ( ) [facility]                                                                                                                   ( ) Patient refused counseling  ( ) Patient refused referral  ( ) Patient refused prescription upon discharge  (X ) Patient has not smoked in the last 30 days    Metabolic Screening:    Lab Results   Component Value Date    LABA1C 5.4 05/07/2016       Lab Results   Component Value Date    CHOL 150 05/07/2016     Lab Results   Component Value Date    TRIG 71 05/07/2016     Lab Results   Component Value Date    HDL 56 05/07/2016     No components found for: \"LDLCAL\"  No components found for: \"LABVLDL\"    Pt discharged to home by cab. All belongings et valuables sent with pt. Pt verbalizes all discharge paperwork. Medications sent to own pharmacy.     Cynthia Lopez RN      
manage stress,relaxation techniques, changing routine, distraction)                                                           ( ) Basic information about quitting (benefits of quitting, techniques in how to quit, available resources  ( ) Referral for [facility]                                                                                                                   ( ) Patient refused counseling  ( x) Patient has not smoked in the last 30 days    Metabolic Screening:    Lab Results   Component Value Date    LABA1C 5.4 05/07/2016       Lab Results   Component Value Date    CHOL 150 05/07/2016     Lab Results   Component Value Date    TRIG 71 05/07/2016     Lab Results   Component Value Date    HDL 56 05/07/2016     No components found for: \"LDLCAL\"  No components found for: \"LABVLDL\"      Body mass index is 31.54 kg/m².    BP Readings from Last 2 Encounters:   03/12/24 129/82   10/12/23 139/78         Pt admitted with followings belongings:  Dental Appliances: None  Vision - Corrective Lenses: Eyeglasses, At bedside  Hearing Aid: None  Jewelry: None  Body Piercings Removed: N/A  Clothing: Footwear, Gloves, Jacket/Coat, Pants, Shirt, Socks  Other Valuables: Money, Purse, Wallet, Personal Toiletries (9.35 in cents)  The following personal items were collected during admission. Items secured in locker/safe. Items will be returned to patient at discharge.     John Man RN

## 2024-03-18 NOTE — GROUP NOTE
Group Therapy Note    Date: 3/18/2024    Group Start Time: 1330  Group End Time: 1415  Group Topic: cognitive skills     [facility]    Melissa Crabtree CTRS        Group Therapy Note    Attendees 5/10      Patient's Goal:  pt will demonstrate improved coping skills and improved communication skills     Notes:   pt was pleasant and participated well    Status After Intervention:  Improved    Participation Level: Active Listener and Interactive    Participation Quality: Appropriate, Sharing, and Supportive      Speech:  normal      Thought Process/Content: Logical      Affective Functioning: Congruent      Mood: euthymic      Level of consciousness:  Alert      Response to Learning: Able to verbalize current knowledge/experience, Capable of insight, and Progressing to goal      Endings: None Reported    Modes of Intervention: Education, Support, and Activity      Discipline Responsible: Psychoeducational Specialist      Signature:  MAGDALENE HUDDLESTON

## 2024-03-18 NOTE — DISCHARGE INSTR - DIET

## 2024-03-18 NOTE — GROUP NOTE
Group Therapy Note    Date: 3/18/2024    Group Start Time: 0915  Group End Time: 0938  Group Topic: Community Meeting    Elham Armas        Group Therapy Note    Attendees: 5/13       Patient's Goal:  Stay positive    Notes:      Status After Intervention:  Unchanged    Participation Level: Active Listener    Participation Quality: Appropriate      Speech:  normal      Thought Process/Content: Logical      Affective Functioning: Congruent      Mood: anxious      Level of consciousness:  Oriented x4      Response to Learning: Able to verbalize current knowledge/experience and Progressing to goal      Endings: None Reported    Modes of Intervention: Education, Support, and Socialization      Discipline Responsible: Behavorial Health Tech      Signature:  Elham Fitch

## 2024-03-19 NOTE — CARE COORDINATION
Name: Mae Vazquez    : 1965    Auth number: [id number]     Discharge Date: 3/18/2024    Destination: home    *If you have any specific discharge questions, please contact the assigned /discharge planner: Adryan [phone]      Discharge Medications:      Medication List        START taking these medications      hydrOXYzine HCl 50 MG tablet  Commonly known as: ATARAX  Take 1 tablet by mouth 3 times daily as needed for Anxiety  Notes to patient: anxiety     traZODone 50 MG tablet  Commonly known as: DESYREL  Take 1 tablet by mouth nightly as needed for Sleep  Notes to patient: Sleep aid            CHANGE how you take these medications      VORTIoxetine 10 MG Tabs tablet  Commonly known as: TRINTELLIX  Take 3 tablets by mouth daily  What changed: medication strength  Notes to patient: Clear thoughts            CONTINUE taking these medications      busPIRone 10 MG tablet  Commonly known as: BUSPAR  Notes to patient: anxiety     docusate sodium 100 MG capsule  Commonly known as: COLACE  Notes to patient: Stool softener     ferrous sulfate 325 (65 Fe) MG tablet  Commonly known as: IRON 325  Notes to patient: Vitamin     OLANZapine 10 MG tablet  Commonly known as: ZYPREXA  Notes to patient: Clear thoughts     omeprazole 40 MG delayed release capsule  Commonly known as: PRILOSEC  Take 1 capsule by mouth daily  Notes to patient: Stomach health     Vitamin D3 83576 UNIT Caps  Generic drug: vitamin D  Notes to patient: Vitamin            STOP taking these medications      buPROPion 150 MG extended release tablet  Commonly known as: WELLBUTRIN XL     mirtazapine 7.5 MG tablet  Commonly known as: REMERON     propranolol 10 MG tablet  Commonly known as: INDERAL     venlafaxine 37.5 MG extended release capsule  Commonly known as: EFFEXOR XR     venlafaxine 37.5 MG tablet  Commonly known as: EFFEXOR            ASK your doctor about these medications      Breo Ellipta 200-25 MCG/ACT Aepb inhaler  Generic 
Hospital on 3/11/2024 for an evaluation, she was referred to [facility] crisis. She endorses suicidal ideation, she reports a plan to overdose on medications in her bubble pack. In addition to suicidal thoughts she reports a decreased appetite. She reports feelings of worthlessness and helplessness. Patient presents with a history of Schizoaffective Disorder. Patient reports she has been off of her medications for a few days prior to admission. Patient's last stay at [facility] was from 10/1-10/12/2023.  Patient is linked with [facility] for outpatient mental health services.

## 2024-03-26 ENCOUNTER — TELEPHONE (OUTPATIENT)
Dept: GASTROENTEROLOGY | Age: 59
End: 2024-03-26

## 2024-03-26 NOTE — TELEPHONE ENCOUNTER
Writer talked to patient and patients states does not want to make an appointment at this time. So did attempt 1 and attempt 2. And it should be made with dr salamanca.

## 2025-01-04 ENCOUNTER — APPOINTMENT (OUTPATIENT)
Dept: GENERAL RADIOLOGY | Age: 60
DRG: 312 | End: 2025-01-04
Payer: COMMERCIAL

## 2025-01-04 ENCOUNTER — HOSPITAL ENCOUNTER (INPATIENT)
Age: 60
LOS: 1 days | Discharge: PSYCHIATRIC OR SUBSTANCE USE FACILITY | DRG: 312 | End: 2025-01-06
Attending: STUDENT IN AN ORGANIZED HEALTH CARE EDUCATION/TRAINING PROGRAM | Admitting: EMERGENCY MEDICINE
Payer: COMMERCIAL

## 2025-01-04 DIAGNOSIS — R55 NEAR SYNCOPE: Primary | ICD-10-CM

## 2025-01-04 LAB
ALBUMIN SERPL-MCNC: 3.9 G/DL (ref 3.5–5.2)
ALBUMIN/GLOB SERPL: 1.3 {RATIO} (ref 1–2.5)
ALP SERPL-CCNC: 107 U/L (ref 35–104)
ALT SERPL-CCNC: 9 U/L (ref 10–35)
ANION GAP SERPL CALCULATED.3IONS-SCNC: 15 MMOL/L (ref 9–16)
AST SERPL-CCNC: 20 U/L (ref 10–35)
BASOPHILS # BLD: 0.04 K/UL (ref 0–0.2)
BASOPHILS NFR BLD: 0 % (ref 0–2)
BILIRUB SERPL-MCNC: 0.2 MG/DL (ref 0–1.2)
BUN SERPL-MCNC: 9 MG/DL (ref 6–20)
CALCIUM SERPL-MCNC: 10 MG/DL (ref 8.6–10.4)
CHLORIDE SERPL-SCNC: 101 MMOL/L (ref 98–107)
CO2 SERPL-SCNC: 24 MMOL/L (ref 20–31)
CREAT SERPL-MCNC: 0.8 MG/DL (ref 0.6–0.9)
D DIMER PPP FEU-MCNC: 0.44 UG/ML FEU (ref 0–0.57)
EKG ATRIAL RATE: 97 BPM
EKG P AXIS: 50 DEGREES
EKG P-R INTERVAL: 116 MS
EKG Q-T INTERVAL: 358 MS
EKG QRS DURATION: 80 MS
EKG QTC CALCULATION (BAZETT): 454 MS
EKG R AXIS: 28 DEGREES
EKG T AXIS: 61 DEGREES
EKG VENTRICULAR RATE: 97 BPM
EOSINOPHIL # BLD: 0.17 K/UL (ref 0–0.44)
EOSINOPHILS RELATIVE PERCENT: 2 % (ref 1–4)
ERYTHROCYTE [DISTWIDTH] IN BLOOD BY AUTOMATED COUNT: 17 % (ref 11.8–14.4)
GFR, ESTIMATED: 85 ML/MIN/1.73M2
GLUCOSE SERPL-MCNC: 153 MG/DL (ref 74–99)
HCT VFR BLD AUTO: 35.2 % (ref 36.3–47.1)
HGB BLD-MCNC: 10.6 G/DL (ref 11.9–15.1)
IMM GRANULOCYTES # BLD AUTO: 0.03 K/UL (ref 0–0.3)
IMM GRANULOCYTES NFR BLD: 0 %
LYMPHOCYTES NFR BLD: 1.16 K/UL (ref 1.1–3.7)
LYMPHOCYTES RELATIVE PERCENT: 12 % (ref 24–43)
MAGNESIUM SERPL-MCNC: 1.8 MG/DL (ref 1.6–2.6)
MCH RBC QN AUTO: 22.1 PG (ref 25.2–33.5)
MCHC RBC AUTO-ENTMCNC: 30.1 G/DL (ref 28.4–34.8)
MCV RBC AUTO: 73.5 FL (ref 82.6–102.9)
MONOCYTES NFR BLD: 0.88 K/UL (ref 0.1–1.2)
MONOCYTES NFR BLD: 9 % (ref 3–12)
NEUTROPHILS NFR BLD: 77 % (ref 36–65)
NEUTS SEG NFR BLD: 7.23 K/UL (ref 1.5–8.1)
NRBC BLD-RTO: 0 PER 100 WBC
PLATELET # BLD AUTO: 404 K/UL (ref 138–453)
PMV BLD AUTO: 11.3 FL (ref 8.1–13.5)
POTASSIUM SERPL-SCNC: 3.5 MMOL/L (ref 3.7–5.3)
PROT SERPL-MCNC: 7 G/DL (ref 6.6–8.7)
RBC # BLD AUTO: 4.79 M/UL (ref 3.95–5.11)
RBC # BLD: ABNORMAL 10*6/UL
SODIUM SERPL-SCNC: 140 MMOL/L (ref 136–145)
TROPONIN I SERPL HS-MCNC: 12 NG/L (ref 0–14)
TROPONIN I SERPL HS-MCNC: 16 NG/L (ref 0–14)
WBC OTHER # BLD: 9.5 K/UL (ref 3.5–11.3)

## 2025-01-04 PROCEDURE — 6370000000 HC RX 637 (ALT 250 FOR IP)

## 2025-01-04 PROCEDURE — 99285 EMERGENCY DEPT VISIT HI MDM: CPT

## 2025-01-04 PROCEDURE — G0378 HOSPITAL OBSERVATION PER HR: HCPCS

## 2025-01-04 PROCEDURE — 80053 COMPREHEN METABOLIC PANEL: CPT

## 2025-01-04 PROCEDURE — 6360000002 HC RX W HCPCS: Performed by: EMERGENCY MEDICINE

## 2025-01-04 PROCEDURE — 83735 ASSAY OF MAGNESIUM: CPT

## 2025-01-04 PROCEDURE — 93005 ELECTROCARDIOGRAM TRACING: CPT | Performed by: EMERGENCY MEDICINE

## 2025-01-04 PROCEDURE — 96374 THER/PROPH/DIAG INJ IV PUSH: CPT

## 2025-01-04 PROCEDURE — 84484 ASSAY OF TROPONIN QUANT: CPT

## 2025-01-04 PROCEDURE — 71046 X-RAY EXAM CHEST 2 VIEWS: CPT

## 2025-01-04 PROCEDURE — 85379 FIBRIN DEGRADATION QUANT: CPT

## 2025-01-04 PROCEDURE — 85025 COMPLETE CBC W/AUTO DIFF WBC: CPT

## 2025-01-04 PROCEDURE — 2500000003 HC RX 250 WO HCPCS: Performed by: EMERGENCY MEDICINE

## 2025-01-04 PROCEDURE — 93010 ELECTROCARDIOGRAM REPORT: CPT | Performed by: INTERNAL MEDICINE

## 2025-01-04 PROCEDURE — 6370000000 HC RX 637 (ALT 250 FOR IP): Performed by: EMERGENCY MEDICINE

## 2025-01-04 RX ORDER — BUSPIRONE HYDROCHLORIDE 10 MG/1
10 TABLET ORAL 3 TIMES DAILY
Status: DISCONTINUED | OUTPATIENT
Start: 2025-01-04 | End: 2025-01-06 | Stop reason: HOSPADM

## 2025-01-04 RX ORDER — CALCIUM CARBONATE 500 MG/1
500 TABLET, CHEWABLE ORAL 3 TIMES DAILY PRN
Status: DISCONTINUED | OUTPATIENT
Start: 2025-01-04 | End: 2025-01-06 | Stop reason: HOSPADM

## 2025-01-04 RX ORDER — TRAZODONE HYDROCHLORIDE 50 MG/1
50 TABLET, FILM COATED ORAL NIGHTLY
Status: DISCONTINUED | OUTPATIENT
Start: 2025-01-04 | End: 2025-01-06 | Stop reason: HOSPADM

## 2025-01-04 RX ORDER — ENOXAPARIN SODIUM 100 MG/ML
40 INJECTION SUBCUTANEOUS DAILY
Status: DISCONTINUED | OUTPATIENT
Start: 2025-01-04 | End: 2025-01-06 | Stop reason: HOSPADM

## 2025-01-04 RX ORDER — ONDANSETRON 4 MG/1
4 TABLET, ORALLY DISINTEGRATING ORAL EVERY 8 HOURS PRN
Status: DISCONTINUED | OUTPATIENT
Start: 2025-01-04 | End: 2025-01-06 | Stop reason: HOSPADM

## 2025-01-04 RX ORDER — ONDANSETRON 2 MG/ML
4 INJECTION INTRAMUSCULAR; INTRAVENOUS EVERY 6 HOURS PRN
Status: DISCONTINUED | OUTPATIENT
Start: 2025-01-04 | End: 2025-01-06 | Stop reason: HOSPADM

## 2025-01-04 RX ORDER — POLYETHYLENE GLYCOL 3350 17 G/17G
17 POWDER, FOR SOLUTION ORAL DAILY PRN
Status: DISCONTINUED | OUTPATIENT
Start: 2025-01-04 | End: 2025-01-06 | Stop reason: HOSPADM

## 2025-01-04 RX ORDER — OLANZAPINE 10 MG/1
10 TABLET, ORALLY DISINTEGRATING ORAL NIGHTLY
Status: DISCONTINUED | OUTPATIENT
Start: 2025-01-04 | End: 2025-01-06 | Stop reason: HOSPADM

## 2025-01-04 RX ORDER — POTASSIUM CHLORIDE 7.45 MG/ML
10 INJECTION INTRAVENOUS PRN
Status: DISCONTINUED | OUTPATIENT
Start: 2025-01-04 | End: 2025-01-06 | Stop reason: HOSPADM

## 2025-01-04 RX ORDER — MAGNESIUM SULFATE IN WATER 40 MG/ML
2000 INJECTION, SOLUTION INTRAVENOUS PRN
Status: DISCONTINUED | OUTPATIENT
Start: 2025-01-04 | End: 2025-01-06 | Stop reason: HOSPADM

## 2025-01-04 RX ORDER — PANTOPRAZOLE SODIUM 40 MG/1
40 TABLET, DELAYED RELEASE ORAL
Status: DISCONTINUED | OUTPATIENT
Start: 2025-01-05 | End: 2025-01-06 | Stop reason: HOSPADM

## 2025-01-04 RX ORDER — SODIUM CHLORIDE 0.9 % (FLUSH) 0.9 %
5-40 SYRINGE (ML) INJECTION EVERY 12 HOURS SCHEDULED
Status: DISCONTINUED | OUTPATIENT
Start: 2025-01-04 | End: 2025-01-06 | Stop reason: HOSPADM

## 2025-01-04 RX ORDER — ACETAMINOPHEN 325 MG/1
650 TABLET ORAL EVERY 6 HOURS PRN
Status: DISCONTINUED | OUTPATIENT
Start: 2025-01-04 | End: 2025-01-06 | Stop reason: HOSPADM

## 2025-01-04 RX ORDER — HYDROXYZINE HYDROCHLORIDE 25 MG/1
25 TABLET, FILM COATED ORAL ONCE
Status: COMPLETED | OUTPATIENT
Start: 2025-01-04 | End: 2025-01-04

## 2025-01-04 RX ORDER — SODIUM CHLORIDE 0.9 % (FLUSH) 0.9 %
5-40 SYRINGE (ML) INJECTION PRN
Status: DISCONTINUED | OUTPATIENT
Start: 2025-01-04 | End: 2025-01-06 | Stop reason: HOSPADM

## 2025-01-04 RX ORDER — SODIUM CHLORIDE 9 MG/ML
INJECTION, SOLUTION INTRAVENOUS PRN
Status: DISCONTINUED | OUTPATIENT
Start: 2025-01-04 | End: 2025-01-06 | Stop reason: HOSPADM

## 2025-01-04 RX ORDER — POTASSIUM CHLORIDE 1500 MG/1
40 TABLET, EXTENDED RELEASE ORAL PRN
Status: DISCONTINUED | OUTPATIENT
Start: 2025-01-04 | End: 2025-01-06 | Stop reason: HOSPADM

## 2025-01-04 RX ORDER — ACETAMINOPHEN 650 MG/1
650 SUPPOSITORY RECTAL EVERY 6 HOURS PRN
Status: DISCONTINUED | OUTPATIENT
Start: 2025-01-04 | End: 2025-01-06 | Stop reason: HOSPADM

## 2025-01-04 RX ADMIN — ONDANSETRON 4 MG: 2 INJECTION INTRAMUSCULAR; INTRAVENOUS at 23:47

## 2025-01-04 RX ADMIN — TRAZODONE HYDROCHLORIDE 50 MG: 50 TABLET ORAL at 20:01

## 2025-01-04 RX ADMIN — BUSPIRONE HYDROCHLORIDE 10 MG: 10 TABLET ORAL at 11:04

## 2025-01-04 RX ADMIN — BUSPIRONE HYDROCHLORIDE 10 MG: 10 TABLET ORAL at 13:46

## 2025-01-04 RX ADMIN — OLANZAPINE 10 MG: 10 TABLET, ORALLY DISINTEGRATING ORAL at 20:01

## 2025-01-04 RX ADMIN — ANTACID TABLETS 500 MG: 500 TABLET, CHEWABLE ORAL at 18:45

## 2025-01-04 RX ADMIN — SODIUM CHLORIDE, PRESERVATIVE FREE 10 ML: 5 INJECTION INTRAVENOUS at 20:04

## 2025-01-04 RX ADMIN — ANTACID TABLETS 500 MG: 500 TABLET, CHEWABLE ORAL at 13:46

## 2025-01-04 RX ADMIN — SODIUM CHLORIDE, PRESERVATIVE FREE 10 ML: 5 INJECTION INTRAVENOUS at 11:05

## 2025-01-04 RX ADMIN — HYDROXYZINE HYDROCHLORIDE 25 MG: 25 TABLET ORAL at 22:39

## 2025-01-04 RX ADMIN — BUSPIRONE HYDROCHLORIDE 10 MG: 10 TABLET ORAL at 20:01

## 2025-01-04 RX ADMIN — POTASSIUM BICARBONATE 40 MEQ: 782 TABLET, EFFERVESCENT ORAL at 11:04

## 2025-01-04 ASSESSMENT — PAIN SCALES - GENERAL
PAINLEVEL_OUTOF10: 0
PAINLEVEL_OUTOF10: 5

## 2025-01-04 ASSESSMENT — PAIN DESCRIPTION - LOCATION: LOCATION: BACK

## 2025-01-04 NOTE — ED NOTES
ED to inpatient nurses report      Chief Complaint:  Chief Complaint   Patient presents with    Fall    Back Pain     Present to ED from: from [facility]     MOA:     LOC: alert and orientated to name, place, date  Mobility: Independent  Oxygen Baseline: room air    Current needs required: figure out why she has dizziness    Pending ED orders: none  Present condition: stable    Why did the patient come to the ED?  presents with near syncope.  Patient states that she got up this morning and was in the bathroom when she became lightheaded and fell to the ground.  She thinks she hit her head.  She is not on blood thinners.    She has not complaining of any nausea or vomiting but has had an episode of diarrhea in the last week.   Patient states that she had a similar episode to this roughly 3 weeks ago and was seen at [facility].    Patient was seen yesterday at [facility] for suicidal ideation.    She had a plan to overdose on pills.  She was discharged to the [facility].        What is the plan? Plan to admit for dizziness, syncope and light-headedness.  Any procedures or intervention occur? Labs, EKG,   Any safety concerns?? Possible fall     Mental Status:       Psych Assessment:   Psychosocial  Psychosocial (WDL): (S) Exceptions to WDL  Patient Behaviors: Anxious, Flight of ideas, Flat affect, Manipulative  Vital signs   Vitals:    01/04/25 0900 01/04/25 0915 01/04/25 0930 01/04/25 0945   BP: (!) 103/58 107/60 94/63 109/72   Pulse: 82 87 86 88   Resp: 16 (!) 33 16 15   Temp:       TempSrc:       SpO2: 98% 98% 98% 100%   Weight:       Height:            Vitals:  Patient Vitals for the past 24 hrs:   BP Temp Temp src Pulse Resp SpO2 Height Weight   01/04/25 0945 109/72 -- -- 88 15 100 % -- --   01/04/25 0930 94/63 -- -- 86 16 98 % -- --   01/04/25 0915 107/60 -- -- 87 (!) 33 98 % -- --   01/04/25 0900 (!) 103/58 -- -- 82 16 98 % -- --   01/04/25 0830 -- -- -- 90 18 97 % -- --   01/04/25 0815 -- -- -- 84 17

## 2025-01-04 NOTE — DISCHARGE INSTRUCTIONS
If you notice any concerning symptoms please return to the ER immediately. These can include but are not limited to: fevers, chills, shortness of breath, vomiting, weakness of the extremities, changes in your mental status, numbness, pale extremities, or chest pain.     Wound care: none    Diet: You may resume your normal diet     Activity: resume activity as tolerated.     Medications: Continue taking your home medications as previously directed. For pain You may take tylenol 1,000mg by mouth every 6 hours as needed for pain. Do not exceed 4,000mg per day. If you have liver disease don't take tylenol. You may also take ibuprofen 600mg every 6-8 hours as needed for pain. Do not exceed 2,400 mg per day. If you experience stomach pain or you have a history of kidney disease stop taking ibuprofen. You may alternate application of ice and heat 20 minutes at a time as desired.      Follow up: Please follow up with your primary care doctor within one week

## 2025-01-04 NOTE — ED PROVIDER NOTES
Socioeconomic History    Marital status: Single     Spouse name: Not on file    Number of children: Not on file    Years of education: Not on file    Highest education level: Not on file   Occupational History    Not on file   Tobacco Use    Smoking status: Never     Passive exposure: Never    Smokeless tobacco: Never   Vaping Use    Vaping status: Never Used   Substance and Sexual Activity    Alcohol use: Not Currently    Drug use: Never    Sexual activity: Not Currently   Other Topics Concern    Not on file   Social History Narrative    Not on file     Social Determinants of Health     Financial Resource Strain: Not on file   Food Insecurity: No Food Insecurity (1/3/2025)    Received from [facility]    Hunger Screening     Within the past 12 months we worried whether our food would run out before we got money to buy more.: Never True     Within the past 12 months the food we bought just didn't last and we didn't have money to get more.: Never True   Transportation Needs: No Transportation Needs (3/30/2024)    Received from [facility]    PRAPARE - Transportation     Lack of Transportation (Medical): No     Lack of Transportation (Non-Medical): No   Physical Activity: Not on file   Stress: Not on file   Social Connections: Not on file   Intimate Partner Violence: Unknown (2/23/2024)    [facility]    [place] Safety & Environment     Fear of Current or Ex-Partner: Not on file     Emotionally Abused: Not on file     Physically Abused: Not on file     Sexually Abused: Not on file     Physically or Sexually Abused: Not on file   Housing Stability: Low Risk  (3/30/2024)    Received from [facility]    Housing Instability     Are you worried or concerned that in the next two months you may not have stable housing that you own, rent or stay in as a part of a household?: No   Recent Concern: Housing Stability - High Risk (3/12/2024)     observation for echo.  Patient is also complaining of diarrhea.  Stool culture ordered if patient does have an episode while she is here.    Amount and/or Complexity of Data Reviewed  Labs: ordered. Decision-making details documented in ED Course.  Radiology: ordered. Decision-making details documented in ED Course.  ECG/medicine tests: ordered.        EKG  EKG Interpretation    Interpreted by emergency department physician    Rhythm: normal sinus   Rate: normal  Axis: normal  Ectopy: none  Conduction: normal  ST Segments: no acute change  T Waves: no acute change  Q Waves: none    Clinical Impression: no acute changes    Jessica Jimenez DO      All EKG's are interpreted by the Emergency Department Physician who either signs or Co-signs this chart in the absence of a cardiologist.    EMERGENCY DEPARTMENT COURSE:      ED Course as of 01/04/25 0950   Sat Jan 04, 2025   0846 D-Dimer, Quant: 0.44 [SK]   0846 Sodium: 140 [SK]   0846 Potassium(!): 3.5 [SK]   0846 Anion Gap: 15 [SK]   0846 Creatinine: 0.8 [SK]   0846 WBC: 9.5 [SK]   0846 Hemoglobin Quant(!): 10.6 [SK]   0846 Platelet Count: 404 [SK]   0846 XR CHEST (2 VW)  No acute process.      [SK]   0935 Troponin, High Sensitivity: 12 [SK]      ED Course User Index  [SK] Jessica Jimenez DO       PROCEDURES:      CONSULTS:  None    CRITICAL CARE:  There was significant risk of life threatening deterioration of patient's condition requiring my direct management. Critical care time 0 minutes, excluding any documented procedures.    FINAL IMPRESSION      1. Near syncope          DISPOSITION / PLAN     DISPOSITION Admitted 01/04/2025 09:49:49 AM               PATIENT REFERRED TO:  Shirley Boss, APRN - CNP  [facility] [address]  [phone]            DISCHARGE MEDICATIONS:  New Prescriptions    No medications on file       Jessica Jimenez DO  Emergency Medicine Resident    (Please note that portions of this note were completed with a voice recognition

## 2025-01-04 NOTE — ED PROVIDER NOTES
[facility]     Emergency Department     Faculty Attestation    I performed a history and physical examination of the patient and discussed management with the resident. I have reviewed and agree with the resident’s findings including all diagnostic interpretations, and treatment plans as written at the time of my review. Any areas of disagreement are noted on the chart. I was personally present for the key portions of any procedures. I have documented in the chart those procedures where I was not present during the key portions. For Physician Assistant/ Nurse Practitioner cases/documentation I have personally evaluated this patient and have completed at least one if not all key elements of the E/M (history, physical exam, and MDM). Additional findings are as noted.    PtName: Mae Vazquez  MRN: [id number]  Birthdate 1965  Date of evaluation: 1/4/25  Note Started: 7:13 AM EST    Primary Care Physician: Shirley Boss, APRN - CNP    Brief HPI:  59-year-old female presents emergency department syncopal episode this morning.  She reports that she was sitting on the toilet having a bowel movement, loose stool.  She stated felt very lightheaded then had a syncopal episode.  Denies hitting her head or loss of consciousness.    Pertinent Physical Exam Findings:  Vitals:    01/04/25 0636   BP: 124/74   Pulse: 97   Resp: 16   Temp: 98 °F (36.7 °C)   SpO2: 99%   Appears well, resting comfortably, no acute distress, no signs of head trauma, regular rate and rhythm, lungs are clear to auscultation.    Medical Decision Making: Patient is a 59 y.o. female presenting to the emergency department with syncopal episode. The chart was reviewed for pertinent history relating to the chief complaint.  The patient appears well at this time, no acute distress, vitals are stable.  See history and physical exam above.  Syncopal episode occurred after having a bowel movement and getting up

## 2025-01-04 NOTE — ED NOTES
The following labs were labeled with appropriate pt sticker and tubed to lab:     [x] Blue     [x] Lavender   [] on ice  [x] Green/yellow  [x] Green/black [] on ice  [] Pablo  [] on ice  [x] Yellow  [] Red  [] Pink  [] Type/ Screen  [] ABG  [] VBG    [] COVID-19 swab    [] Rapid  [] PCR  [] Flu swab  [] Peds Viral Panel     [] Urine Sample  [] Fecal Sample  [] Pelvic Cultures  [] Blood Cultures  [] X 2  [] STREP Cultures  [] Wound Cultures

## 2025-01-04 NOTE — PROGRESS NOTES
CDU Daily Progress Note  Attending Physician       Pt Name: Mae Vazquez  MRN: [id number]  Birthdate 1965  Date of evaluation: 1/4/25    I performed a history and physical examination of the patient and discussed management with the resident. I reviewed the resident’s note and agree with the documented findings and plan of care. Any areas of disagreement are noted on the chart. I was personally present for the key portions of any procedures. I have documented in the chart those procedures where I was not present during the key portions. I have reviewed the emergency nurses triage note. I agree with the chief complaint, past medical history, past surgical history, allergies, medications, social and family history as documented unless otherwise noted below. Documentation of the HPI, Physical Exam and Medical Decision Making performed by medical students or scribes is based on my personal performance of the HPI, PE and MDM. For Physician Assistant/ Nurse Practitioner cases/documentation I have personally evaluated this patient and have completed at least one if not all key elements of the E/M (history, physical exam, and MDM). Additional findings are as noted.    The Family History, Social History and Review of Systems are unchanged from the previous day. No significant events overnight. This patient was placed in the observation unit for reevaluation for possible admission to the hospital.     Awaiting cardiology for evaluation.  Appreciate input.     Madeleine Ruiz MD,    Attending Physician  Critical Decision Unit

## 2025-01-04 NOTE — CARE COORDINATION
Consult :from [facility] states she doesn't  get along  with family she lives with.  Placed call to [facility]  Crisis spoke with Mary states   pt was discharged , however pt can come back to be assessed if she she chooses to return.  Met with pt  this date was alert and oriented  Pt states she went to [facility] 1/3/24 and today felt lightheaded and was sent to ED.  Pt states she has been  going to [facility] for   Treatment since 2004, however states she wanted to change to [facility] for services.  Pt states she plans to return to [facility] after d/c. Pt denies having any SI/HI.  Additional [facility] resources were provided.  CM informed of above.

## 2025-01-04 NOTE — CARE COORDINATION
Case Management Assessment  Initial Observation Evaluation    Date/Time of Evaluation: 1/4/2025 3:50 PM  Assessment Completed by: NAN CASTRO RN    If patient is discharged prior to next notation, then this note serves as note for discharge by case management.    Patient Name: Mae Vazquez                   YOB: 1965  Diagnosis: Near syncope [R55]                   Date / Time: 1/4/2025  6:33 AM      CM Services requested for transitional needs.     Patient Admission Status: Observation   Readmission Risk (Low < 19, Mod (19-27), High > 27): Readmission Risk Score: 10.6    Current PCP: Shirley Boss APRN - CNP  PCP verified by CM? Yes       History Provided by: Patient  Patient Orientation: Alert and Oriented    Patient Cognition: Alert      ADLS  Prior functional level: Independent in ADLs/IADLs  Current functional level: Independent in ADLs/IADLs  Family can provide assistance at DC: No  Would you like Case Management to discuss the discharge plan with any other family members/significant others, and if so, who? No    Financial    Payor: [employer] DUAL BENEFITS / Plan: [employer] / Product Type: *No Product type* /       Transportation/Food Security/Housing Addressed:  No issues identified.     Equipment needs:     Case Management Services Information Letter Provided []    Transition plan: from Trevor, LEELA consulted

## 2025-01-04 NOTE — ED NOTES
Pt presents to ED via EMS from [facility] d/t fall causing back pain. Pt denies LOC, but reports dizziness and falling backwards. Per EMS, pt did not hit head. Pt denies blood  thinner use. Pt reports staying at [facility] for anxiety and depression but denies suicidal and homicidal ideation. Pt appears extremely anxious.  Pt is alert and oriented x4, speaking in complete sentences.  Pt placed on full cardiac monitor, EKG completed, IV access established. Labs drawn.

## 2025-01-04 NOTE — H&P
[facility]  CDU / OBSERVATION ENCOUNTER  Physician NOTE     Pt Name: Mae Vazquez  MRN: [id number]  Birthdate 1965  Date of evaluation: 1/4/25  Patient's PCP is :  Shirley Boss, NORM - MARGARET    CHIEF COMPLAINT       Chief Complaint   Patient presents with    Fall    Back Pain         HISTORY OF PRESENT ILLNESS    Mae Vazquez is a 59 y.o. female who presents to the ED after a near syncopal episode.  Patient reports that this occurred at roughly 6 AM 1/4/2025.  She reports that she was going to the restroom, became lightheaded, subsequently falling to the ground.  She reports that she may remain out of hit her head and is uncertain.  She denies being on any blood thinners.    Chart review reveals that the patient has had previous episodes of suicidal ideation, most recently 1/3/2025.  Patient endorsed a plan to overdose on pills.  She was subsequently discharged to the [facility].  Workup performed in the ED showed no acute process on chest x-ray, H&H which was abnormal, CMP within normal limits, negative D-dimer, elevated troponins (downtrending).  H&H as well as GI panel were both ordered.    While interviewing the patient this morning, patient has noted to be somewhat delayed in her responses to basic questions.  She also appears visibly anxious.    Location/Symptom: Syncopal episode  Timing/Onset: 1 day  Provocation: N/A  Quality: N/A  Radiation: None  Severity: 8/10  Timing/Duration: 1 day  Modifying Factors: None    History was obtained in part through review of the ED chart. When possible, a direct discussion was had with ED nurses, residents, and attendings  REVIEW OF SYSTEMS       General ROS - No fevers, No malaise   Ophthalmic ROS - No discharge, No changes in vision  ENT ROS -  No sore throat, No rhinorrhea,   Respiratory ROS - no shortness of breath, no cough, no  wheezing  Cardiovascular ROS - No chest pain, no dyspnea on exertion  Gastrointestinal ROS - No abdominal pain,

## 2025-01-05 PROBLEM — D64.9 ANEMIA: Status: ACTIVE | Noted: 2025-01-05

## 2025-01-05 LAB
HCT VFR BLD AUTO: 31.9 % (ref 36.3–47.1)
HGB BLD-MCNC: 9.3 G/DL (ref 11.9–15.1)

## 2025-01-05 PROCEDURE — 6370000000 HC RX 637 (ALT 250 FOR IP): Performed by: EMERGENCY MEDICINE

## 2025-01-05 PROCEDURE — 96376 TX/PRO/DX INJ SAME DRUG ADON: CPT

## 2025-01-05 PROCEDURE — 6370000000 HC RX 637 (ALT 250 FOR IP)

## 2025-01-05 PROCEDURE — 93005 ELECTROCARDIOGRAM TRACING: CPT | Performed by: EMERGENCY MEDICINE

## 2025-01-05 PROCEDURE — 85018 HEMOGLOBIN: CPT

## 2025-01-05 PROCEDURE — 96372 THER/PROPH/DIAG INJ SC/IM: CPT

## 2025-01-05 PROCEDURE — 36415 COLL VENOUS BLD VENIPUNCTURE: CPT

## 2025-01-05 PROCEDURE — 1200000000 HC SEMI PRIVATE

## 2025-01-05 PROCEDURE — 85014 HEMATOCRIT: CPT

## 2025-01-05 PROCEDURE — 2500000003 HC RX 250 WO HCPCS: Performed by: EMERGENCY MEDICINE

## 2025-01-05 PROCEDURE — 6360000002 HC RX W HCPCS: Performed by: EMERGENCY MEDICINE

## 2025-01-05 RX ADMIN — OLANZAPINE 10 MG: 10 TABLET, ORALLY DISINTEGRATING ORAL at 21:01

## 2025-01-05 RX ADMIN — BUSPIRONE HYDROCHLORIDE 10 MG: 10 TABLET ORAL at 14:54

## 2025-01-05 RX ADMIN — ONDANSETRON 4 MG: 2 INJECTION INTRAMUSCULAR; INTRAVENOUS at 14:54

## 2025-01-05 RX ADMIN — SODIUM CHLORIDE, PRESERVATIVE FREE 10 ML: 5 INJECTION INTRAVENOUS at 08:54

## 2025-01-05 RX ADMIN — ENOXAPARIN SODIUM 40 MG: 100 INJECTION SUBCUTANEOUS at 08:54

## 2025-01-05 RX ADMIN — TRAZODONE HYDROCHLORIDE 50 MG: 50 TABLET ORAL at 21:01

## 2025-01-05 RX ADMIN — SODIUM CHLORIDE, PRESERVATIVE FREE 10 ML: 5 INJECTION INTRAVENOUS at 14:54

## 2025-01-05 RX ADMIN — SODIUM CHLORIDE, PRESERVATIVE FREE 10 ML: 5 INJECTION INTRAVENOUS at 20:56

## 2025-01-05 RX ADMIN — BUSPIRONE HYDROCHLORIDE 10 MG: 10 TABLET ORAL at 08:54

## 2025-01-05 RX ADMIN — ANTACID TABLETS 500 MG: 500 TABLET, CHEWABLE ORAL at 08:54

## 2025-01-05 RX ADMIN — PANTOPRAZOLE SODIUM 40 MG: 40 TABLET, DELAYED RELEASE ORAL at 08:54

## 2025-01-05 RX ADMIN — BUSPIRONE HYDROCHLORIDE 10 MG: 10 TABLET ORAL at 21:01

## 2025-01-05 RX ADMIN — VORTIOXETINE 30 MG: 10 TABLET, FILM COATED ORAL at 08:54

## 2025-01-05 ASSESSMENT — PAIN SCALES - GENERAL
PAINLEVEL_OUTOF10: 0

## 2025-01-05 ASSESSMENT — PAIN SCALES - WONG BAKER
WONGBAKER_NUMERICALRESPONSE: NO HURT
WONGBAKER_NUMERICALRESPONSE: NO HURT

## 2025-01-05 NOTE — CONSULTS
[facility]    Consult / H&P               Today's Date: 1/5/2025  Patient Name: Mae Vazquez  Date of admission: 1/4/2025  6:33 AM  Patient's age: 59 y.o., 1965  Admission Dx: Near syncope [R55]    Requesting Physician: Moisés Perez MD    Cardiac Evaluation Reason: Presyncope elevated troponin    History Obtained From: patient and chart review     History of Present Illness:      59-year-old female with past medical history of mood disorders, asthma presented to the ED after near syncopal event.  Patient reports that she was going to the restroom, became lightheaded and subsequently fell to the ground.  Did not hit her head.  Has not been eating and drinking for the last couple of days because of her depression.  Has happened before where she would get up while sitting down and he feels lightheaded and dizzy.  She has significant depression with suicidal ideations, history of multiple admission to [facility].  Upon arrival, patient was mildly hypotensive.  Her troponins were 16> 12.  EKG was unremarkable.        Past Medical History:   has a past medical history of Asthma, GERD (gastroesophageal reflux disease), Iron deficiency anemia, and Schizoaffective disorder, bipolar type (HCC).    Past Surgical History:   has a past surgical history that includes pr hemiarthroplasty hip partial.     Home Medications:    Prior to Admission medications    Medication Sig Start Date End Date Taking? Authorizing Provider   traZODone (DESYREL) 50 MG tablet Take 1 tablet by mouth nightly as needed for Sleep 3/18/24  Yes Karthik Hutchinson MD   omeprazole (PRILOSEC) 40 MG delayed release capsule Take 1 capsule by mouth daily 3/18/24  Yes Karthik Hutchinson MD   VORTIoxetine (TRINTELLIX) 10 MG TABS tablet Take 3 tablets by mouth daily 3/18/24  Yes Karthik Hutchinson MD   OLANZapine (ZYPREXA) 10 MG tablet Take 1 tablet by mouth nightly   Yes ProviderDamian MD   busPIRone (BUSPAR) 10 MG tablet Take 1

## 2025-01-05 NOTE — PROGRESS NOTES
[facility]  CDU / OBSERVATION ENCOUNTER  ATTENDING NOTE         I performed a history and physical examination of the patient and discussed management with the resident or midlevel provider. I reviewed the resident or midlevel provider's note and agree with the documented findings and plan of care. Any areas of disagreement are noted on the chart. I was personally present for the key portions of any procedures. I have documented in the chart those procedures where I was not present during the key portions. I have reviewed the nurses notes. I agree with the chief complaint, past medical history, past surgical history, allergies, medications, social and family history as documented unless otherwise noted below.    The Family history, social history, and ROS are effectively unchanged since admission unless noted elsewhere in the chart.     This patient was placed in the observation unit for reevaluation for possible admission to the hospital     Patient with a history of mood disorders and asthma presents with near syncopal event.  Patient was in the restroom became lightheaded and subsequently fell to the ground.  Patient did not been eating and drinking over the last several days due to depression.  This has happened before.  She has significant depression with suicidal ideation previously with multiple admissions to mental health center.  Patient had negative troponin    Patient was admitted for cardiology evaluation.  Patient has remote history of echo and no previous cardiac stress testing       Moisés Perez MD  Attending Emergency  Physician

## 2025-01-05 NOTE — PROGRESS NOTES
OBS/CDU   Progress NOTE      Patients PCP is:  Shirley Boss, APRN - CNP        SUBJECTIVE      No acute events overnight.  Patient resting comfortably in bed.    PHYSICAL EXAM      General: NAD, AO X 3  Heent: EOMI, PERRL  Neck: SUPPLE, NO JVD  Cardiovascular: RRR, S1S2  Pulmonary: CTAB, NO SOB  Abdomen: SOFT, NTTP, ND, +BS  Extremities: +2/4 PULSES DISTAL, NO SWELLING  Neuro / Psych: NO NUMBNESS OR TINGLING, MENTATION AT BASELINE    PERTINENT TEST /EXAMS      I have reviewed all available laboratory results.    MEDICATIONS CURRENT   sodium chloride flush 0.9 % injection 5-40 mL, 2 times per day  sodium chloride flush 0.9 % injection 5-40 mL, PRN  0.9 % sodium chloride infusion, PRN  potassium chloride (KLOR-CON M) extended release tablet 40 mEq, PRN   Or  potassium bicarb-citric acid (EFFER-K) effervescent tablet 40 mEq, PRN   Or  potassium chloride 10 mEq/100 mL IVPB (Peripheral Line), PRN  magnesium sulfate 2000 mg in 50 mL IVPB premix, PRN  enoxaparin (LOVENOX) injection 40 mg, Daily  ondansetron (ZOFRAN-ODT) disintegrating tablet 4 mg, Q8H PRN   Or  ondansetron (ZOFRAN) injection 4 mg, Q6H PRN  polyethylene glycol (GLYCOLAX) packet 17 g, Daily PRN  acetaminophen (TYLENOL) tablet 650 mg, Q6H PRN   Or  acetaminophen (TYLENOL) suppository 650 mg, Q6H PRN  busPIRone (BUSPAR) tablet 10 mg, TID  traZODone (DESYREL) tablet 50 mg, Nightly  VORTIoxetine (TRINTELLIX) tablet 30 mg, Daily  OLANZapine zydis (ZYPREXA) disintegrating tablet 10 mg, Nightly  pantoprazole (PROTONIX) tablet 40 mg, QAM AC  calcium carbonate (TUMS) chewable tablet 500 mg, TID PRN        All medication charted and reviewed.    CONSULTS      IP CONSULT TO CARDIOLOGY  IP CONSULT TO SOCIAL WORK    ASSESSMENT/PLAN       Mae Vazquez is a 59 y.o. female who presents with complaints of a syncopal episode 1/4/2025.  She denies having hit her head.  Physical exam does not reveal any acute abnormalities of the head.    Cardiology consultation:

## 2025-01-06 ENCOUNTER — APPOINTMENT (OUTPATIENT)
Age: 60
DRG: 312 | End: 2025-01-06
Payer: COMMERCIAL

## 2025-01-06 ENCOUNTER — HOSPITAL ENCOUNTER (INPATIENT)
Age: 60
LOS: 7 days | Discharge: HOME | DRG: 885 | End: 2025-01-13
Attending: PSYCHIATRY & NEUROLOGY | Admitting: PSYCHIATRY & NEUROLOGY
Payer: COMMERCIAL

## 2025-01-06 VITALS
RESPIRATION RATE: 18 BRPM | HEART RATE: 81 BPM | OXYGEN SATURATION: 93 % | BODY MASS INDEX: 32.76 KG/M2 | WEIGHT: 178 LBS | HEIGHT: 62 IN | DIASTOLIC BLOOD PRESSURE: 86 MMHG | SYSTOLIC BLOOD PRESSURE: 125 MMHG | TEMPERATURE: 98.6 F

## 2025-01-06 LAB
ECHO AO ROOT DIAM: 2.8 CM
ECHO AO ROOT INDEX: 1.54 CM/M2
ECHO AV AREA PEAK VELOCITY: 2.5 CM2
ECHO AV AREA VTI: 2.7 CM2
ECHO AV AREA/BSA PEAK VELOCITY: 1.4 CM2/M2
ECHO AV AREA/BSA VTI: 1.5 CM2/M2
ECHO AV MEAN GRADIENT: 4 MMHG
ECHO AV MEAN VELOCITY: 0.9 M/S
ECHO AV PEAK GRADIENT: 10 MMHG
ECHO AV PEAK VELOCITY: 1.6 M/S
ECHO AV VELOCITY RATIO: 0.81
ECHO AV VTI: 26.8 CM
ECHO BSA: 1.88 M2
ECHO LA AREA 2C: 17.4 CM2
ECHO LA AREA 4C: 18.9 CM2
ECHO LA DIAMETER INDEX: 1.98 CM/M2
ECHO LA DIAMETER: 3.6 CM
ECHO LA MAJOR AXIS: 5.6 CM
ECHO LA MINOR AXIS: 5.2 CM
ECHO LA TO AORTIC ROOT RATIO: 1.29
ECHO LA VOL BP: 49 ML (ref 22–52)
ECHO LA VOL MOD A2C: 48 ML (ref 22–52)
ECHO LA VOL MOD A4C: 47 ML (ref 22–52)
ECHO LA VOL/BSA BIPLANE: 27 ML/M2 (ref 16–34)
ECHO LA VOLUME INDEX MOD A2C: 26 ML/M2 (ref 16–34)
ECHO LA VOLUME INDEX MOD A4C: 26 ML/M2 (ref 16–34)
ECHO LV E' LATERAL VELOCITY: 10.2 CM/S
ECHO LV E' SEPTAL VELOCITY: 9.46 CM/S
ECHO LV EDV A2C: 63 ML
ECHO LV EDV A4C: 58 ML
ECHO LV EDV INDEX A4C: 32 ML/M2
ECHO LV EDV NDEX A2C: 35 ML/M2
ECHO LV EJECTION FRACTION A2C: 69 %
ECHO LV EJECTION FRACTION A4C: 65 %
ECHO LV EJECTION FRACTION BIPLANE: 67 % (ref 55–100)
ECHO LV ESV A2C: 20 ML
ECHO LV ESV A4C: 20 ML
ECHO LV ESV INDEX A2C: 11 ML/M2
ECHO LV ESV INDEX A4C: 11 ML/M2
ECHO LV FRACTIONAL SHORTENING: 35 % (ref 28–44)
ECHO LV INTERNAL DIMENSION DIASTOLE INDEX: 2.03 CM/M2
ECHO LV INTERNAL DIMENSION DIASTOLIC: 3.7 CM (ref 3.9–5.3)
ECHO LV INTERNAL DIMENSION SYSTOLIC INDEX: 1.32 CM/M2
ECHO LV INTERNAL DIMENSION SYSTOLIC: 2.4 CM
ECHO LV IVSD: 0.9 CM (ref 0.6–0.9)
ECHO LV MASS 2D: 96.9 G (ref 67–162)
ECHO LV MASS INDEX 2D: 53.2 G/M2 (ref 43–95)
ECHO LV POSTERIOR WALL DIASTOLIC: 0.9 CM (ref 0.6–0.9)
ECHO LV RELATIVE WALL THICKNESS RATIO: 0.49
ECHO LVOT AREA: 3.1 CM2
ECHO LVOT AV VTI INDEX: 0.86
ECHO LVOT DIAM: 2 CM
ECHO LVOT MEAN GRADIENT: 3 MMHG
ECHO LVOT PEAK GRADIENT: 6 MMHG
ECHO LVOT PEAK VELOCITY: 1.3 M/S
ECHO LVOT STROKE VOLUME INDEX: 39.7 ML/M2
ECHO LVOT SV: 72.2 ML
ECHO LVOT VTI: 23 CM
ECHO MV A VELOCITY: 0.96 M/S
ECHO MV AREA VTI: 2.6 CM2
ECHO MV E DECELERATION TIME (DT): 224 MS
ECHO MV E VELOCITY: 0.93 M/S
ECHO MV E/A RATIO: 0.97
ECHO MV E/E' LATERAL: 9.12
ECHO MV E/E' RATIO (AVERAGED): 9.47
ECHO MV E/E' SEPTAL: 9.83
ECHO MV LVOT VTI INDEX: 1.21
ECHO MV MAX VELOCITY: 1 M/S
ECHO MV MEAN GRADIENT: 2 MMHG
ECHO MV MEAN VELOCITY: 0.6 M/S
ECHO MV PEAK GRADIENT: 4 MMHG
ECHO MV VTI: 27.9 CM
ECHO PV MAX VELOCITY: 1.1 M/S
ECHO PV PEAK GRADIENT: 5 MMHG
ECHO RV BASAL DIMENSION: 3.3 CM
ECHO RV FREE WALL PEAK S': 11.6 CM/S
ECHO RV TAPSE: 3 CM (ref 1.7–?)
ECHO TV REGURGITANT MAX VELOCITY: 2.25 M/S
ECHO TV REGURGITANT PEAK GRADIENT: 20 MMHG
EKG ATRIAL RATE: 81 BPM
EKG P AXIS: 16 DEGREES
EKG P-R INTERVAL: 128 MS
EKG Q-T INTERVAL: 398 MS
EKG QRS DURATION: 82 MS
EKG QTC CALCULATION (BAZETT): 462 MS
EKG R AXIS: -8 DEGREES
EKG T AXIS: 41 DEGREES
EKG VENTRICULAR RATE: 81 BPM
FERRITIN SERPL-MCNC: 12 NG/ML (ref 15–150)
HCT VFR BLD AUTO: 36.5 % (ref 36.3–47.1)
HGB BLD-MCNC: 10.6 G/DL (ref 11.9–15.1)
IRON SATN MFR SERPL: 4 % (ref 20–55)
IRON SERPL-MCNC: 15 UG/DL (ref 37–145)
TIBC SERPL-MCNC: 340 UG/DL (ref 250–450)
UNSATURATED IRON BINDING CAPACITY: 325 UG/DL (ref 112–347)

## 2025-01-06 PROCEDURE — 6360000002 HC RX W HCPCS: Performed by: EMERGENCY MEDICINE

## 2025-01-06 PROCEDURE — 99221 1ST HOSP IP/OBS SF/LOW 40: CPT | Performed by: NURSE PRACTITIONER

## 2025-01-06 PROCEDURE — 96372 THER/PROPH/DIAG INJ SC/IM: CPT

## 2025-01-06 PROCEDURE — 82728 ASSAY OF FERRITIN: CPT

## 2025-01-06 PROCEDURE — 93306 TTE W/DOPPLER COMPLETE: CPT | Performed by: INTERNAL MEDICINE

## 2025-01-06 PROCEDURE — 2500000003 HC RX 250 WO HCPCS: Performed by: EMERGENCY MEDICINE

## 2025-01-06 PROCEDURE — 93010 ELECTROCARDIOGRAM REPORT: CPT | Performed by: INTERNAL MEDICINE

## 2025-01-06 PROCEDURE — 85014 HEMATOCRIT: CPT

## 2025-01-06 PROCEDURE — 1240000000 HC EMOTIONAL WELLNESS R&B

## 2025-01-06 PROCEDURE — 93306 TTE W/DOPPLER COMPLETE: CPT

## 2025-01-06 PROCEDURE — 99233 SBSQ HOSP IP/OBS HIGH 50: CPT | Performed by: NURSE PRACTITIONER

## 2025-01-06 PROCEDURE — 6370000000 HC RX 637 (ALT 250 FOR IP): Performed by: NURSE PRACTITIONER

## 2025-01-06 PROCEDURE — 83550 IRON BINDING TEST: CPT

## 2025-01-06 PROCEDURE — 36415 COLL VENOUS BLD VENIPUNCTURE: CPT

## 2025-01-06 PROCEDURE — 83540 ASSAY OF IRON: CPT

## 2025-01-06 PROCEDURE — 6370000000 HC RX 637 (ALT 250 FOR IP)

## 2025-01-06 PROCEDURE — 85018 HEMOGLOBIN: CPT

## 2025-01-06 PROCEDURE — 6370000000 HC RX 637 (ALT 250 FOR IP): Performed by: EMERGENCY MEDICINE

## 2025-01-06 RX ORDER — HALOPERIDOL 5 MG/1
5 TABLET ORAL EVERY 6 HOURS PRN
Status: DISCONTINUED | OUTPATIENT
Start: 2025-01-06 | End: 2025-01-13 | Stop reason: HOSPADM

## 2025-01-06 RX ORDER — HALOPERIDOL 5 MG/ML
5 INJECTION INTRAMUSCULAR EVERY 6 HOURS PRN
Status: DISCONTINUED | OUTPATIENT
Start: 2025-01-06 | End: 2025-01-13 | Stop reason: HOSPADM

## 2025-01-06 RX ORDER — POLYETHYLENE GLYCOL 3350 17 G/17G
17 POWDER, FOR SOLUTION ORAL DAILY PRN
Status: DISCONTINUED | OUTPATIENT
Start: 2025-01-06 | End: 2025-01-13 | Stop reason: HOSPADM

## 2025-01-06 RX ORDER — DIPHENHYDRAMINE HYDROCHLORIDE 50 MG/ML
50 INJECTION INTRAMUSCULAR; INTRAVENOUS EVERY 6 HOURS PRN
Status: DISCONTINUED | OUTPATIENT
Start: 2025-01-06 | End: 2025-01-13 | Stop reason: HOSPADM

## 2025-01-06 RX ORDER — TRAZODONE HYDROCHLORIDE 50 MG/1
50 TABLET, FILM COATED ORAL NIGHTLY PRN
Status: DISCONTINUED | OUTPATIENT
Start: 2025-01-06 | End: 2025-01-13 | Stop reason: HOSPADM

## 2025-01-06 RX ORDER — HYDROXYZINE HYDROCHLORIDE 50 MG/1
50 TABLET, FILM COATED ORAL 3 TIMES DAILY PRN
Status: DISCONTINUED | OUTPATIENT
Start: 2025-01-06 | End: 2025-01-13 | Stop reason: HOSPADM

## 2025-01-06 RX ORDER — MAGNESIUM HYDROXIDE/ALUMINUM HYDROXICE/SIMETHICONE 120; 1200; 1200 MG/30ML; MG/30ML; MG/30ML
30 SUSPENSION ORAL EVERY 6 HOURS PRN
Status: DISCONTINUED | OUTPATIENT
Start: 2025-01-06 | End: 2025-01-13 | Stop reason: HOSPADM

## 2025-01-06 RX ORDER — ACETAMINOPHEN 325 MG/1
650 TABLET ORAL EVERY 4 HOURS PRN
Status: DISCONTINUED | OUTPATIENT
Start: 2025-01-06 | End: 2025-01-13 | Stop reason: HOSPADM

## 2025-01-06 RX ADMIN — TRAZODONE HYDROCHLORIDE 50 MG: 50 TABLET ORAL at 21:43

## 2025-01-06 RX ADMIN — BUSPIRONE HYDROCHLORIDE 10 MG: 10 TABLET ORAL at 08:56

## 2025-01-06 RX ADMIN — ENOXAPARIN SODIUM 40 MG: 100 INJECTION SUBCUTANEOUS at 08:55

## 2025-01-06 RX ADMIN — PANTOPRAZOLE SODIUM 40 MG: 40 TABLET, DELAYED RELEASE ORAL at 06:27

## 2025-01-06 RX ADMIN — HYDROXYZINE HYDROCHLORIDE 50 MG: 50 TABLET ORAL at 21:43

## 2025-01-06 RX ADMIN — ANTACID TABLETS 500 MG: 500 TABLET, CHEWABLE ORAL at 08:55

## 2025-01-06 RX ADMIN — ALUMINUM HYDROXIDE, MAGNESIUM HYDROXIDE, AND SIMETHICONE 30 ML: 200; 200; 20 SUSPENSION ORAL at 22:47

## 2025-01-06 RX ADMIN — BUSPIRONE HYDROCHLORIDE 10 MG: 10 TABLET ORAL at 14:48

## 2025-01-06 RX ADMIN — VORTIOXETINE 30 MG: 10 TABLET, FILM COATED ORAL at 08:56

## 2025-01-06 RX ADMIN — SODIUM CHLORIDE, PRESERVATIVE FREE 10 ML: 5 INJECTION INTRAVENOUS at 08:56

## 2025-01-06 ASSESSMENT — PAIN SCALES - WONG BAKER
WONGBAKER_NUMERICALRESPONSE: NO HURT

## 2025-01-06 ASSESSMENT — SLEEP AND FATIGUE QUESTIONNAIRES
DO YOU USE A SLEEP AID: YES
AVERAGE NUMBER OF SLEEP HOURS: 6
DO YOU HAVE DIFFICULTY SLEEPING: YES
SLEEP PATTERN: DISTURBED/INTERRUPTED SLEEP

## 2025-01-06 ASSESSMENT — PATIENT HEALTH QUESTIONNAIRE - PHQ9
SUM OF ALL RESPONSES TO PHQ QUESTIONS 1-9: 2
2. FEELING DOWN, DEPRESSED OR HOPELESS: SEVERAL DAYS
SUM OF ALL RESPONSES TO PHQ9 QUESTIONS 1 & 2: 2
SUM OF ALL RESPONSES TO PHQ QUESTIONS 1-9: 2
1. LITTLE INTEREST OR PLEASURE IN DOING THINGS: SEVERAL DAYS

## 2025-01-06 ASSESSMENT — PAIN SCALES - GENERAL: PAINLEVEL_OUTOF10: 0

## 2025-01-06 NOTE — PROGRESS NOTES
[facility]  CDU / OBSERVATION ENCOUNTER  ATTENDING NOTE         I performed a history and physical examination of the patient and discussed management with the resident or midlevel provider. I reviewed the resident or midlevel provider's note and agree with the documented findings and plan of care. Any areas of disagreement are noted on the chart. I was personally present for the key portions of any procedures. I have documented in the chart those procedures where I was not present during the key portions. I have reviewed the nurses notes. I agree with the chief complaint, past medical history, past surgical history, allergies, medications, social and family history as documented unless otherwise noted below.    The Family history, social history, and ROS are effectively unchanged since admission unless noted elsewhere in the chart.     This patient was placed in the observation unit for reevaluation for possible admission to the hospital     Patient is medically cleared for treatment at [facility].    Patient has had a medical workup which is negative so far.  Patient has been discussed and evaluated with psychiatry.  Patient is ready for further psychiatric treatment.       Moisés Perez MD  Attending Emergency  Physician

## 2025-01-06 NOTE — PROGRESS NOTES
Transfer Center Handoff for Behavioral Health Transfers      Patient's Current Location: [facility] OBSERVATION UNIT     Chief Complaint   Patient presents with    Fall    Back Pain       Current or History of Violent Behavior: No    Currently in Restraints Now or During this Encounter: No  (Specify if Agitation or self harm is noted in ED?)  If yes, please describe behaviors requiring restraint:             Medical Clearance Documented and Verified in the Chart: Yes    Allergies   Allergen Reactions    Penicillins Anaphylaxis and Angioedema     Tolerates cephalexin    Animal Chews      Animal dander      Dust Mite Extract     Fish-Derived Products     Molds & Smuts         Can Patient Tolerate Lying Flat: Yes    Able to Perform ADLs:  Yes  (Specify if able to ambulate or uses any mobility devices such as cane or walker)  Activity: In bed  Level of Assistance:    Assistive Device:    Miscellaneous Devices:      LABS    CBC:   Lab Results   Component Value Date/Time    WBC 9.5 01/04/2025 07:20 AM    RBC 4.79 01/04/2025 07:20 AM    HGB 10.6 01/06/2025 09:16 AM    HCT 36.5 01/06/2025 09:16 AM    MCV 73.5 01/04/2025 07:20 AM    MCH 22.1 01/04/2025 07:20 AM    MCHC 30.1 01/04/2025 07:20 AM    RDW 17.0 01/04/2025 07:20 AM     01/04/2025 07:20 AM    MPV 11.3 01/04/2025 07:20 AM     CMP:   Lab Results   Component Value Date/Time     01/04/2025 07:20 AM    K 3.5 01/04/2025 07:20 AM     01/04/2025 07:20 AM    CO2 24 01/04/2025 07:20 AM    BUN 9 01/04/2025 07:20 AM    CREATININE 0.8 01/04/2025 07:20 AM    GFRAA >60 05/07/2016 10:57 AM    LABGLOM 85 01/04/2025 07:20 AM    LABGLOM >60 03/12/2024 06:11 PM    GLUCOSE 153 01/04/2025 07:20 AM    CALCIUM 10.0 01/04/2025 07:20 AM    BILITOT 0.2 01/04/2025 07:20 AM    ALKPHOS 107 01/04/2025 07:20 AM    AST 20 01/04/2025 07:20 AM    ALT 9 01/04/2025 07:20 AM     Drug Panel:   Lab Results   Component Value Date/Time    OPIAU NEGATIVE 03/12/2024 07:13 PM     UA:  Lab Results

## 2025-01-06 NOTE — PROGRESS NOTES
[facility]   Progress Note                   Date:   1/6/2025  Patient name: Mae Vazquez  Date of admission:  1/4/2025  6:33 AM  MRN:   [id number]  YOB: 1965  PCP: Shirley Boss, APRN - CNP    Reason for Admission: Near syncope [R55]  Anemia [D64.9]    Subjective:       Clinical Changes / Abnormalities: Pt seen and examined in the room.  Pt denies any CP or sob.  Pt states that she is feeling much better.  Labs, vitals and tele reviewed-        Medications:   Scheduled Meds:   sodium chloride flush  5-40 mL IntraVENous 2 times per day    enoxaparin  40 mg SubCUTAneous Daily    busPIRone  10 mg Oral TID    traZODone  50 mg Oral Nightly    VORTIoxetine  30 mg Oral Daily    OLANZapine zydis  10 mg Oral Nightly    pantoprazole  40 mg Oral QAM AC     Continuous Infusions:   sodium chloride       CBC:   Recent Labs     01/04/25  0720 01/05/25  0540   WBC 9.5  --    HGB 10.6* 9.3*     --      BMP:    Recent Labs     01/04/25  0720      K 3.5*      CO2 24   BUN 9   CREATININE 0.8   GLUCOSE 153*     Hepatic:   Recent Labs     01/04/25  0720   AST 20   ALT 9*   BILITOT 0.2   ALKPHOS 107*     Troponin:   Recent Labs     01/04/25  0720 01/04/25  0853   TROPHS 16* 12     BNP: No results for input(s): \"BNP\" in the last 72 hours.  Lipids: No results for input(s): \"CHOL\", \"HDL\" in the last 72 hours.    Invalid input(s): \"LDLCALCU\"  INR: No results for input(s): \"INR\" in the last 72 hours.    ECHO 1/6/25    Left Ventricle: Normal left ventricular systolic function with a   visually estimated EF of 60 - 65%. EF by 2D Simpsons Biplane is 67%. Left   ventricle size is normal. Normal wall thickness. Normal wall motion.   Normal diastolic function.     Tricuspid Valve: Mild regurgitation.     Image quality is adequate.   Objective:   Vitals: /86   Pulse 81   Temp 98.6 °F (37 °C) (Oral)   Resp 18   Ht 1.575 m (5' 2\")   Wt 80.7 kg (178 lb)   LMP 05/03/2016   SpO2 93%

## 2025-01-06 NOTE — CONSULTS
History:     RESIDENCE: Lives with family, her father and niece, niece's wife  : single    CHILDREN: no  OCCUPATION: SSDI   EDUCATION: college graduate    Past Medical History:        Diagnosis Date    Asthma     GERD (gastroesophageal reflux disease)     Iron deficiency anemia     Schizoaffective disorder, bipolar type (HCC)        Past Surgical History:        Procedure Laterality Date    HI HEMIARTHROPLASTY HIP PARTIAL      Hip Replacement, Partial, left       Family Medical and Psychiatric History:   Denies family psych history        Problem Relation Age of Onset    Heart Disease Mother     Cancer Father        Medications Prior to Admission:   Medications Prior to Admission: traZODone (DESYREL) 50 MG tablet, Take 1 tablet by mouth nightly as needed for Sleep  omeprazole (PRILOSEC) 40 MG delayed release capsule, Take 1 capsule by mouth daily  VORTIoxetine (TRINTELLIX) 10 MG TABS tablet, Take 3 tablets by mouth daily  OLANZapine (ZYPREXA) 10 MG tablet, Take 1 tablet by mouth nightly  busPIRone (BUSPAR) 10 MG tablet, Take 1 tablet by mouth 3 times daily  docusate sodium (COLACE) 100 MG capsule, Take 1 capsule by mouth daily as needed for Constipation  fluticasone furoate-vilanterol (BREO ELLIPTA) 200-25 MCG/ACT AEPB inhaler, Inhale 1 puff into the lungs daily (Patient not taking: Reported on 3/13/2024)  ferrous sulfate (IRON 325) 325 (65 Fe) MG tablet, Take 1 tablet by mouth daily (with breakfast)  Cholecalciferol (VITAMIN D3) 1.25 MG (82535 UT) CAPS, Take 1 capsule by mouth once a week Sundays    Allergies:  Penicillins, Animal chews, Dust mite extract, Fish-derived products, and Molds & smuts    Lifetime Psychiatric Review of Systems         Depression: Endorses     Anxiety: Endorses     Panic Attacks: Endorses     Herlinda or Hypomania: Denies, yes per EMR     Phobias: Significant social phobia/strangers     Obsessions and Compulsions: Endorses a history of trichotillomania     Body or Vocal Tics: Denies      Visual Hallucinations: Denies     Auditory Hallucinations: Endorses     Delusions/Paranoia: Endorses     PTSD: Denies    Prior to Admission medications    Medication Sig Start Date End Date Taking? Authorizing Provider   traZODone (DESYREL) 50 MG tablet Take 1 tablet by mouth nightly as needed for Sleep 3/18/24  Yes Karthik Hutchinson MD   omeprazole (PRILOSEC) 40 MG delayed release capsule Take 1 capsule by mouth daily 3/18/24  Yes Karthik Hutchinson MD   VORTIoxetine (TRINTELLIX) 10 MG TABS tablet Take 3 tablets by mouth daily 3/18/24  Yes Karthik Hutchinson MD   OLANZapine (ZYPREXA) 10 MG tablet Take 1 tablet by mouth nightly   Yes Damian Oconnor MD   busPIRone (BUSPAR) 10 MG tablet Take 1 tablet by mouth 3 times daily   Yes Damian Oconnor MD   docusate sodium (COLACE) 100 MG capsule Take 1 capsule by mouth daily as needed for Constipation    Damian Oconnor MD   fluticasone furoate-vilanterol (BREO ELLIPTA) 200-25 MCG/ACT AEPB inhaler Inhale 1 puff into the lungs daily  Patient not taking: Reported on 3/13/2024    Damian Oconnor MD   ferrous sulfate (IRON 325) 325 (65 Fe) MG tablet Take 1 tablet by mouth daily (with breakfast)    Damian Oconnor MD   Cholecalciferol (VITAMIN D3) 1.25 MG (74905 UT) CAPS Take 1 capsule by mouth once a week Sundays    Damian Oconnor MD        Medications:    Current Facility-Administered Medications: sodium chloride flush 0.9 % injection 5-40 mL, 5-40 mL, IntraVENous, 2 times per day  sodium chloride flush 0.9 % injection 5-40 mL, 5-40 mL, IntraVENous, PRN  0.9 % sodium chloride infusion, , IntraVENous, PRN  potassium chloride (KLOR-CON M) extended release tablet 40 mEq, 40 mEq, Oral, PRN **OR** potassium bicarb-citric acid (EFFER-K) effervescent tablet 40 mEq, 40 mEq, Oral, PRN **OR** potassium chloride 10 mEq/100 mL IVPB (Peripheral Line), 10 mEq, IntraVENous, PRN  magnesium sulfate 2000 mg in 50 mL IVPB premix, 2,000 mg, IntraVENous,
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
## 2025-01-06 NOTE — PROGRESS NOTES
OBS/CDU   Progress NOTE      Patients PCP is:  Shirley Boss, APRN - CNP        SUBJECTIVE      No acute events overnight.  Patient resting comfortably in bed.  Previously placed labs for iron and ferritin were canceled due to unknown reasons.  Will touch base with the attending physician regarding this.  H&H has been ordered for this morning.     PHYSICAL EXAM      General: NAD, AO X 3  Heent: EOMI, PERRL  Neck: SUPPLE, NO JVD  Cardiovascular: RRR, S1S2  Pulmonary: CTAB, NO SOB  Abdomen: SOFT, NTTP, ND, +BS  Extremities: +2/4 PULSES DISTAL, NO SWELLING  Neuro / Psych: NO NUMBNESS OR TINGLING, MENTATION AT BASELINE    PERTINENT TEST /EXAMS      I have reviewed all available laboratory results.    MEDICATIONS CURRENT   sodium chloride flush 0.9 % injection 5-40 mL, 2 times per day  sodium chloride flush 0.9 % injection 5-40 mL, PRN  0.9 % sodium chloride infusion, PRN  potassium chloride (KLOR-CON M) extended release tablet 40 mEq, PRN   Or  potassium bicarb-citric acid (EFFER-K) effervescent tablet 40 mEq, PRN   Or  potassium chloride 10 mEq/100 mL IVPB (Peripheral Line), PRN  magnesium sulfate 2000 mg in 50 mL IVPB premix, PRN  enoxaparin (LOVENOX) injection 40 mg, Daily  ondansetron (ZOFRAN-ODT) disintegrating tablet 4 mg, Q8H PRN   Or  ondansetron (ZOFRAN) injection 4 mg, Q6H PRN  polyethylene glycol (GLYCOLAX) packet 17 g, Daily PRN  acetaminophen (TYLENOL) tablet 650 mg, Q6H PRN   Or  acetaminophen (TYLENOL) suppository 650 mg, Q6H PRN  busPIRone (BUSPAR) tablet 10 mg, TID  traZODone (DESYREL) tablet 50 mg, Nightly  VORTIoxetine (TRINTELLIX) tablet 30 mg, Daily  OLANZapine zydis (ZYPREXA) disintegrating tablet 10 mg, Nightly  pantoprazole (PROTONIX) tablet 40 mg, QAM AC  calcium carbonate (TUMS) chewable tablet 500 mg, TID PRN        All medication charted and reviewed.    CONSULTS      IP CONSULT TO CARDIOLOGY  IP CONSULT TO SOCIAL WORK  IP CONSULT TO PSYCHIATRY  IP CONSULT TO SOCIAL WORK    ASSESSMENT/PLAN        Mae Vazquez is a 59 y.o. female who presents with complaints of syncopal episode 1/4/2025.  She denies having hit her head.  Patient was found to be anemic, which appears to be a chronic issue for the last several months.    Anemia:  Ordered iron studies 1/5/2025, which were canceled due to unknown reasons.  Ordered repeat H&H for today  Cardiology consultation: Recommendations appreciated  Patient to undergo TTE  Negative   Patient is medically cleared for transfer to [facility]  Psychiatry consultation: Recommendations appreciated  Patient to be transferred to [facility]   Social work consultation: Recommendations appreciated  Continue home medications and pain control  Monitor vitals, labs, and imaging  DISPO: pending consults and clinical improvement    --  Nael Still MD  Observation Physician     This dictation was generated by voice recognition computer software.  Although all attempts are made to edit the dictation for accuracy, there may be errors in the transcription that are not intended.

## 2025-01-06 NOTE — DISCHARGE SUMMARY
CDU Discharge Summary        Patient:  Mae Vazquez  YOB: 1965    MRN: [id number]   Acct: [id number]    Primary Care Physician: Shirley Boss APRN - CNP    Admit date:  1/4/2025  6:33 AM  Discharge date: 1/6/25    Discharge Diagnoses:     1.)  Patient had syncopal episode with sudden onset due to unknown etiology.  Treated with cardiology consultation .  Patient's symptoms are stable with the plan to transfer to [facility]. Patient is medically stable for admission to [facility].     Follow-up:  Call today/tomorrow for a follow up appointment with Shirley Boss APRN - CNP , or return to the Emergency Room with worsening symptoms    Stressed to patient the importance of following up with primary care doctor for further workup/management of symptoms.  Pt verbalizes understanding and agrees with plan.    Discharge Medication Changes:       Medication List        CONTINUE taking these medications      busPIRone 10 MG tablet  Commonly known as: BUSPAR     docusate sodium 100 MG capsule  Commonly known as: COLACE     ferrous sulfate 325 (65 Fe) MG tablet  Commonly known as: IRON 325     OLANZapine 10 MG tablet  Commonly known as: ZYPREXA     omeprazole 40 MG delayed release capsule  Commonly known as: PRILOSEC  Take 1 capsule by mouth daily     traZODone 50 MG tablet  Commonly known as: DESYREL  Take 1 tablet by mouth nightly as needed for Sleep     Vitamin D3 1.25 MG (87686 UT) Caps     VORTIoxetine 10 MG Tabs tablet  Commonly known as: TRINTELLIX  Take 3 tablets by mouth daily            ASK your doctor about these medications      Breo Ellipta 200-25 MCG/ACT Aepb inhaler  Generic drug: fluticasone furoate-vilanterol              Diet:  ADULT DIET; Regular, advance as tolerated     Activity:  As tolerated    Consultants: IP CONSULT TO CARDIOLOGY  IP CONSULT TO SOCIAL WORK  IP CONSULT TO PSYCHIATRY  IP CONSULT TO SOCIAL WORK    Procedures:  Not indicated     Diagnostic Test:   Results for orders  Velocity 2.5 cm2    Aortic Root 2.8 cm    MV E Wave Deceleration Time 224.0 ms    MV A Velocity 0.96 m/s    MV E Velocity 0.93 m/s    MV Mean Gradient 2 mmHg    MV VTI 27.9 cm    MV Mean Velocity 0.6 m/s    MV Max Velocity 1.0 m/s    MV Peak Gradient 4 mmHg    MV Area by VTI 2.6 cm2    PV Max Velocity 1.1 m/s    PV Peak Gradient 5 mmHg    RV Basal Dimension 3.3 cm    RV Free Wall Peak S' 11.6 cm/s    TAPSE 3.0 1.7 cm    TR Max Velocity 2.25 m/s    TR Peak Gradient 20 mmHg    Body Surface Area 1.88 m2    Fractional Shortening 2D 35 28 - 44 %    LV ESV Index A4C 11 mL/m2    LV EDV Index A4C 32 mL/m2    LV ESV Index A2C 11 mL/m2    LV EDV Index A2C 35 mL/m2    LVIDd Index 2.03 cm/m2    LVIDs Index 1.32 cm/m2    LV RWT Ratio 0.49     LV Mass 2D 96.9 67 - 162 g    LV Mass 2D Index 53.2 43 - 95 g/m2    MV E/A 0.97     E/E' Ratio (Averaged) 9.47     E/E' Lateral 9.12     E/E' Septal 9.83     LA Volume Index BP 27 16 - 34 ml/m2    LVOT Stroke Volume Index 39.7 mL/m2    LA Volume Index MOD A2C 26 16 - 34 ml/m2    LA Volume Index MOD A4C 26 16 - 34 ml/m2    LA Size Index 1.98 cm/m2    LA/AO Root Ratio 1.29     Ao Root Index 1.54 cm/m2    AV Velocity Ratio 0.81     LVOT:AV VTI Index 0.86     DEBORAH/BSA VTI 1.5 cm2/m2    DEBORAH/BSA Peak Velocity 1.4 cm2/m2    MV:LVOT VTI Index 1.21      XR CHEST (2 VW)    Result Date: 1/4/2025  EXAMINATION: TWO XRAY VIEWS OF THE CHEST 1/4/2025 7:39 am COMPARISON: None. HISTORY: ORDERING SYSTEM PROVIDED HISTORY: syncope TECHNOLOGIST PROVIDED HISTORY: syncope FINDINGS: The lungs are without acute focal process.  There is no effusion or pneumothorax. The cardiomediastinal silhouette is without acute process. The osseous structures are without acute process.     No acute process.           Physical Exam:    General appearance - NAD, AOx 3   Lungs -CTAB, no R/R/R  Heart - RRR, no M/R/G  Abdomen - Soft, NT/ND  Neurological -  MAEx4, No focal motor deficit, sensory loss  Extremities - Cap refil <2 sec in

## 2025-01-07 PROBLEM — F32.A DEPRESSION WITH SUICIDAL IDEATION: Status: RESOLVED | Noted: 2023-10-01 | Resolved: 2025-01-07

## 2025-01-07 PROBLEM — R45.851 DEPRESSION WITH SUICIDAL IDEATION: Status: RESOLVED | Noted: 2023-10-01 | Resolved: 2025-01-07

## 2025-01-07 LAB
AMPHET UR QL SCN: NEGATIVE
BARBITURATES UR QL SCN: NEGATIVE
BENZODIAZ UR QL: NEGATIVE
CANNABINOIDS UR QL SCN: NEGATIVE
COCAINE UR QL SCN: NEGATIVE
FENTANYL UR QL: NEGATIVE
METHADONE UR QL: POSITIVE
OPIATES UR QL SCN: NEGATIVE
OXYCODONE UR QL SCN: NEGATIVE
PCP UR QL SCN: NEGATIVE
TEST INFORMATION: ABNORMAL

## 2025-01-07 PROCEDURE — 2500000003 HC RX 250 WO HCPCS

## 2025-01-07 PROCEDURE — 80307 DRUG TEST PRSMV CHEM ANLYZR: CPT

## 2025-01-07 PROCEDURE — 6370000000 HC RX 637 (ALT 250 FOR IP): Performed by: NURSE PRACTITIONER

## 2025-01-07 PROCEDURE — 99222 1ST HOSP IP/OBS MODERATE 55: CPT

## 2025-01-07 PROCEDURE — APPSS60 APP SPLIT SHARED TIME 46-60 MINUTES

## 2025-01-07 PROCEDURE — 99223 1ST HOSP IP/OBS HIGH 75: CPT | Performed by: PSYCHIATRY & NEUROLOGY

## 2025-01-07 PROCEDURE — 6370000000 HC RX 637 (ALT 250 FOR IP)

## 2025-01-07 PROCEDURE — 6370000000 HC RX 637 (ALT 250 FOR IP): Performed by: PSYCHIATRY & NEUROLOGY

## 2025-01-07 PROCEDURE — 1240000000 HC EMOTIONAL WELLNESS R&B

## 2025-01-07 RX ORDER — BUSPIRONE HYDROCHLORIDE 15 MG/1
15 TABLET ORAL 3 TIMES DAILY
Status: DISCONTINUED | OUTPATIENT
Start: 2025-01-07 | End: 2025-01-13 | Stop reason: HOSPADM

## 2025-01-07 RX ORDER — MULTIVITAMIN WITH FOLIC ACID 400 MCG
1 TABLET ORAL DAILY
Status: ON HOLD | COMMUNITY
Start: 2024-12-21 | End: 2025-01-13 | Stop reason: HOSPADM

## 2025-01-07 RX ORDER — M-VIT,TX,IRON,MINS/CALC/FOLIC 27MG-0.4MG
1 TABLET ORAL DAILY
Status: DISCONTINUED | OUTPATIENT
Start: 2025-01-08 | End: 2025-01-13 | Stop reason: HOSPADM

## 2025-01-07 RX ORDER — PANTOPRAZOLE SODIUM 20 MG/1
20 TABLET, DELAYED RELEASE ORAL DAILY
COMMUNITY
Start: 2024-12-11

## 2025-01-07 RX ORDER — GUAIFENESIN 600 MG/1
600 TABLET, EXTENDED RELEASE ORAL 2 TIMES DAILY
Status: DISCONTINUED | OUTPATIENT
Start: 2025-01-07 | End: 2025-01-13 | Stop reason: HOSPADM

## 2025-01-07 RX ORDER — PANTOPRAZOLE SODIUM 20 MG/1
20 TABLET, DELAYED RELEASE ORAL
Status: DISCONTINUED | OUTPATIENT
Start: 2025-01-08 | End: 2025-01-08

## 2025-01-07 RX ORDER — OLANZAPINE 10 MG/1
10 TABLET ORAL NIGHTLY
Status: DISCONTINUED | OUTPATIENT
Start: 2025-01-07 | End: 2025-01-13 | Stop reason: HOSPADM

## 2025-01-07 RX ORDER — FERROUS SULFATE 325(65) MG
325 TABLET ORAL
Status: DISCONTINUED | OUTPATIENT
Start: 2025-01-08 | End: 2025-01-13 | Stop reason: HOSPADM

## 2025-01-07 RX ADMIN — TRAZODONE HYDROCHLORIDE 50 MG: 50 TABLET ORAL at 21:34

## 2025-01-07 RX ADMIN — OLANZAPINE 10 MG: 10 TABLET, FILM COATED ORAL at 22:08

## 2025-01-07 RX ADMIN — BUSPIRONE HYDROCHLORIDE 15 MG: 15 TABLET ORAL at 22:08

## 2025-01-07 RX ADMIN — ALUMINUM HYDROXIDE, MAGNESIUM HYDROXIDE, AND SIMETHICONE 30 ML: 200; 200; 20 SUSPENSION ORAL at 14:14

## 2025-01-07 RX ADMIN — HYDROXYZINE HYDROCHLORIDE 50 MG: 50 TABLET ORAL at 21:34

## 2025-01-07 RX ADMIN — HYDROXYZINE HYDROCHLORIDE 50 MG: 50 TABLET ORAL at 08:53

## 2025-01-07 RX ADMIN — MICONAZOLE NITRATE: 20 POWDER TOPICAL at 21:36

## 2025-01-07 RX ADMIN — GUAIFENESIN 600 MG: 600 TABLET, EXTENDED RELEASE ORAL at 21:34

## 2025-01-07 ASSESSMENT — LIFESTYLE VARIABLES
HOW OFTEN DO YOU HAVE A DRINK CONTAINING ALCOHOL: NEVER
HOW MANY STANDARD DRINKS CONTAINING ALCOHOL DO YOU HAVE ON A TYPICAL DAY: PATIENT DOES NOT DRINK
HOW MANY STANDARD DRINKS CONTAINING ALCOHOL DO YOU HAVE ON A TYPICAL DAY: PATIENT DOES NOT DRINK
HOW OFTEN DO YOU HAVE A DRINK CONTAINING ALCOHOL: NEVER

## 2025-01-07 ASSESSMENT — PAIN DESCRIPTION - DESCRIPTORS: DESCRIPTORS: DISCOMFORT

## 2025-01-07 ASSESSMENT — PAIN DESCRIPTION - ORIENTATION: ORIENTATION: MID

## 2025-01-07 ASSESSMENT — PAIN SCALES - GENERAL: PAINLEVEL_OUTOF10: 3

## 2025-01-07 ASSESSMENT — PAIN DESCRIPTION - LOCATION: LOCATION: THROAT

## 2025-01-07 NOTE — BH NOTE
[facility]  Admission Note     Admission Type:   Voluntary     Reason for admission:  Reason for Admission: Patient reports increased depression and anxiety. She reports triggers being her nieces wife who is \"mean \" to her whom she resides with. Patient reports suicidal thoughts to go to sleep and not wake up. She stated thoughts of overdosing on medications but scared it wouldnt work      Addictive Behavior:   Addictive Behavior  In the Past 3 Months, Have You Felt or Has Someone Told You That You Have a Problem With  : None    Medical Problems:   Past Medical History:   Diagnosis Date    Asthma     GERD (gastroesophageal reflux disease)     Iron deficiency anemia     Schizoaffective disorder, bipolar type ([facility])        Status EXAM:  Mental Status and Behavioral Exam  Normal: No  Level of Assistance: Independent/Self  Facial Expression: Avoids Gaze, Sad, Worried  Affect: Unstable  Level of Consciousness: Alert  Frequency of Checks: 4 times per hour, close  Mood:Normal: No  Mood: Depressed, Anxious, Helpless, Sad  Motor Activity:Normal: Yes  Eye Contact: Good  Observed Behavior: Friendly, Preoccupied, Tearful  Sexual Misconduct History: Current - no  Preception: [place] to person, [place] to time, [place] to situation, [place] to place  Attention:Normal: No  Attention: Distractible, Unable to concentrate  Thought Processes: Unremarkable  Thought Content:Normal: No  Thought Content: Preoccupations, Phobias  Depression Symptoms: Crying, Change in energy level, Feelings of worthlessness, Feelings of hopelessess, Feelings of helplessness, Loss of interest, Sleep disturbance, Impaired concentration, Isolative  Anxiety Symptoms: Unexplained fears, Generalized  Herlinda Symptoms: No problems reported or observed.  Hallucinations: None  Delusions: No  Memory:Normal: Yes  Insight and Judgment: No  Insight and Judgment: Poor judgment, Poor insight    Tobacco Screening:  Practical Counseling, on admission, marisabel X, if

## 2025-01-07 NOTE — PLAN OF CARE
[facility]  Initial Interdisciplinary Treatment Plan Note      Original treatment plan Date & Time: 1/7/2025   1245    Admission Type:  Admission Type: Voluntary    Reason for admission:   Reason for Admission: Patient reports increased depression and anxiety. She reports triggers being her nieces wife who is \"mean \" to her whom she resides with. Patient reports suicidal thoughts to go to sleep and not wake up. She stated thoughts of overdosing on medications but scared it wouldnt work    Estimated Length of Stay:  5-7days  Estimated Discharge Date: To be determined by physician.    PATIENT STRENGTHS:  Patient Strengths:   Patient Strengths and Limitations:Limitations: Difficulty problem solving/relies on others to help solve problems, Apathetic / unmotivated  Addictive Behavior: Addictive Behavior  In the Past 3 Months, Have You Felt or Has Someone Told You That You Have a Problem With  : None  Medical Problems:  Past Medical History:   Diagnosis Date    Asthma     GERD (gastroesophageal reflux disease)     Iron deficiency anemia     Schizoaffective disorder, bipolar type ([facility])      Status EXAM:Mental Status and Behavioral Exam  Normal: No  Level of Assistance: Set up  Facial Expression: Avoids Gaze, Sad, Worried  Affect: Unstable  Level of Consciousness: Alert  Frequency of Checks: 4 times per hour, close  Mood:Normal: No  Mood: Depressed, Anxious, Helpless, Terrified, Sad  Motor Activity:Normal: Yes  Motor Activity: Decreased  Eye Contact: Fair  Observed Behavior: Friendly, Cooperative, Tearful  Sexual Misconduct History: Current - no  Preception: [place] to person, [place] to time, [place] to place, [place] to situation  Attention:Normal: No  Attention: Distractible, Unable to concentrate  Thought Processes: Unremarkable  Thought Content:Normal: No  Thought Content: Phobias, Preoccupations  Depression Symptoms: Crying, Feelings of helplessness, Feelings of hopelessess, Feelings of

## 2025-01-07 NOTE — GROUP NOTE
Group Therapy Note    Date: 1/7/2025    Group Start Time: 1330  Group End Time: 1415  Group Topic: Recreational    [facility] Melissa Alvarez CTRS    Recreation Group Note        Date: January 7, 2025 Start Time: 1:30pm  End Time:  215 pm      Number of Participants in Group & Unit Census:  4/18    Topic: recreation therapy group     Goal of Group: pt will demonstrate improved coping skills and improved leisure awareness       Comments:     Patient did not participate in Recreation group, despite staff encouragement and explanation of benefits.  Patient remain seclusive to self.  Q15 minute safety checks maintained for patient safety and will continue to encourage patient to attend unit programming.            Signature:  MAGDALENE HUDDLESTON

## 2025-01-07 NOTE — GROUP NOTE
Group Therapy Note    Date: 1/7/2025    Group Start Time: 1015  Group End Time: 1045  Group Topic: Psychoeducation    Adryan Madrigal        Group Therapy Note    Attendees: 11/22       Patient's Goal:  PT will demonstrate increased interpersonal interaction and participate in group activities of discussing coping skills    Notes:  Patient is making progress, AEB participating in group discussion, actively listening, and supporting other group members    Status After Intervention:  Unchanged    Participation Level: Active Listener and Interactive    Participation Quality: Appropriate, Attentive, and Sharing      Speech:  normal      Thought Process/Content: Logical      Affective Functioning: Flat      Mood: depressed      Level of consciousness:  Alert, Oriented x4, and Attentive      Response to Learning: Able to verbalize/acknowledge new learning and Progressing to goal      Endings: None Reported    Modes of Intervention: Education, Support, and Socialization      Discipline Responsible: /Counselor      Signature:  Adryan Avalos

## 2025-01-07 NOTE — H&P
[facility] Internal Medicine  Refugio Powell MD; Sumanth Young MD, Reji Bethea MD, Estrella Multani MD, Abad Fairchild MD; Margarita Arnold MD    [facility] Internal Medicine   IN-PATIENT SERVICE   [facility]     HISTORY AND PHYSICAL EXAMINATION            Date:   1/7/2025  Patient name:  Mae Vazquez  Date of admission:  1/6/2025  9:12 PM  MRN:   [id number]  Account:  [id number]  YOB: 1965  PCP:    Shirley Boss APRN - CNP  Room:   [address] [place] [address]  Code Status:    Prior      Chief Complaint:     Suicidal /Ac Psychosis    History Obtained From:     Patient/EMR/bedside RN     History of Present Illness:     Patient with past medical history of GERD, admitted for depression with suicidal ideation.  Initially admitted to [place] [facility] for syncope.  Workup has been unremarkable.  Cardiology was consulted, likely POTS.  Patient to follow-up as outpatient.  Orthostats negative.    Past Medical History:     Past Medical History:   Diagnosis Date    Asthma     GERD (gastroesophageal reflux disease)     Iron deficiency anemia     Schizoaffective disorder, bipolar type (HCC)         Past Surgical History:     Past Surgical History:   Procedure Laterality Date    [place] HEMIARTHROPLASTY HIP PARTIAL      Hip Replacement, Partial, left        Medications Prior to Admission:     Prior to Admission medications    Medication Sig Start Date End Date Taking? Authorizing Provider   Multiple Vitamin (MULTIVITAMIN) tablet Take 1 tablet by mouth daily 12/21/24   Damian Oconnor MD   pantoprazole (PROTONIX) 20 MG tablet Take 1 tablet by mouth daily 12/11/24   Damian Oconnor MD   traZODone (DESYREL) 50 MG tablet Take 1 tablet by mouth nightly as needed for Sleep 3/18/24   Karthik Hutchinson MD   VORTIoxetine (TRINTELLIX) 10 MG TABS tablet Take 3 tablets by mouth daily 3/18/24   Karthik Hutchinson MD   OLANZapine (ZYPREXA) 10 MG tablet Take 1 tablet by mouth 
violence or aggression: [] Yes [x] No         PSYCHIATRIC HISTORY:  [x] Yes [] No    Currently follows with [facility]   Denies lifetime suicide attempts   psychiatric hospital admissions    Home Medication Compliance: [] Yes [x] No    Past psychiatric medications includes:  Effexor, Wellbutrin, BuSpar, Ativan, Cogentin, Trintellix, Vraylar, bupropion, propranolol, Klonopin, olanzapine     Adverse reactions from psychotropic medications: [] Yes [x] No         Lifetime Psychiatric Review of Systems         Depression: Endorses     Anxiety: Endorses     Panic Attacks: Endorses     Herlinda or Hypomania: Denies     Phobias: Significant social phobia/strangers     Obsessions and Compulsions: Endorses a history of trichotillomania     Body or Vocal Tics: Denies     Visual Hallucinations: Denies     Auditory Hallucinations: Endorses     Delusions/Paranoia: Endorses     PTSD: Denies    Past Medical History:        Diagnosis Date    Asthma     GERD (gastroesophageal reflux disease)     Iron deficiency anemia     Schizoaffective disorder, bipolar type (HCC)        Past Surgical History:        Procedure Laterality Date    [place] HEMIARTHROPLASTY HIP PARTIAL      Hip Replacement, Partial, left       Allergies:  Penicillins, Animal chews, Dust mite extract, Fish-derived products, and Molds & smuts         Social History: Per emr   Born in: [place]  Family: Reports that her mother  3 years ago.  She currently resides with her father and a niece.  She reports that she had a younger brother but he  several years ago related to an drug overdose  Highest Level of Education: Graduate of [employer] , attended [employer], received a degree as a  and then later a bachelor's of communications  Occupation: Receives Social Security benefits, she has never had success working as she had difficulty keeping up with basic demands of job performance offers \"my mother had me tested and they told me I had a nonverbal

## 2025-01-07 NOTE — PLAN OF CARE
Problem: Anxiety  Goal: Will report anxiety at manageable levels  Description: INTERVENTIONS:  1. Administer medication as ordered  2. Teach and rehearse alternative coping skills  3. Provide emotional support with 1:1 interaction with staff  Outcome: Progressing  Flowsheets  Taken 1/7/2025 0943  Will report anxiety at manageable levels: Administer medication as ordered  Taken 1/7/2025 0931  Will report anxiety at manageable levels:   Administer medication as ordered   Teach and rehearse alternative coping skills  Note: Pt encouraged to explore coping skills for reducing anxiety.   Patient is complaining of anxiety at this time. Medication was given as prescribed for increased anxiety see MAR.      Problem: Self Harm/Suicidality  Goal: Will have no self-injury during hospital stay  Description: INTERVENTIONS:  1.  Ensure constant observer at bedside with Q15M safety checks  2.  Maintain a safe environment  3.  Secure patient belongings  4.  Ensure family/visitors adhere to safety recommendations  5.  Ensure safety tray has been added to patient's diet order  6.  Every shift and PRN: Re-assess suicidal risk via Frequent Screener    Outcome: Progressing  Flowsheets (Taken 1/7/2025 0943)  Will have no self-injury during hospital stay: Ensure constant observer at bedside with Q15M safety checks  Note: Patient is free of self harm at this time.  Patient agrees to seek out staff if thoughts to harm self arise.  Staff will provide support and reassurance as needed.  Safety checks maintained every 15 minutes.      Problem: Safety - Adult  Goal: Free from fall injury  Outcome: Progressing  Note: Patient remains free from falls at this time. Gripper socks applied, Stay by assist with showering.          Patient tearful and reports anxiety and depression as she feels she needs more help at home or placement for additional help with ADLS. Patient denies Suicidal and homicidal ideations. Denies and delusions or auditory or

## 2025-01-07 NOTE — GROUP NOTE
Group Therapy Note    Date: 1/7/2025    Group Start Time: 1100  Group End Time: 1140  Group Topic: Cognitive Skills    [facility]    Melissa Crabtree CTRS        Group Therapy Note    Attendees 8/22       Patient's Goal:  pt will demonstrate improved coping skills and improved interpersonal relationships    Notes:   pt was pleasant and participated well    Status After Intervention:  Improved    Participation Level: Active Listener and Interactive    Participation Quality: Appropriate, Attentive, and Supportive      Speech:  normal      Thought Process/Content: Logical      Affective Functioning: Congruent      Mood: euthymic      Level of consciousness:  Alert      Response to Learning: Able to verbalize current knowledge/experience, Capable of insight, and Progressing to goal      Endings: None Reported    Modes of Intervention: Education, Support, and Activity      Discipline Responsible: Psychoeducational Specialist      Signature:  MAGDALENE HUDDLESTON

## 2025-01-08 PROCEDURE — 6370000000 HC RX 637 (ALT 250 FOR IP): Performed by: NURSE PRACTITIONER

## 2025-01-08 PROCEDURE — 1240000000 HC EMOTIONAL WELLNESS R&B

## 2025-01-08 PROCEDURE — 6370000000 HC RX 637 (ALT 250 FOR IP)

## 2025-01-08 PROCEDURE — 99232 SBSQ HOSP IP/OBS MODERATE 35: CPT | Performed by: PSYCHIATRY & NEUROLOGY

## 2025-01-08 PROCEDURE — 6370000000 HC RX 637 (ALT 250 FOR IP): Performed by: PSYCHIATRY & NEUROLOGY

## 2025-01-08 PROCEDURE — APPSS30 APP SPLIT SHARED TIME 16-30 MINUTES

## 2025-01-08 PROCEDURE — 99232 SBSQ HOSP IP/OBS MODERATE 35: CPT

## 2025-01-08 RX ORDER — PANTOPRAZOLE SODIUM 40 MG/1
40 TABLET, DELAYED RELEASE ORAL
Status: DISCONTINUED | OUTPATIENT
Start: 2025-01-09 | End: 2025-01-08

## 2025-01-08 RX ORDER — ONDANSETRON 4 MG/1
4 TABLET, ORALLY DISINTEGRATING ORAL EVERY 8 HOURS PRN
Status: DISCONTINUED | OUTPATIENT
Start: 2025-01-08 | End: 2025-01-13 | Stop reason: HOSPADM

## 2025-01-08 RX ORDER — MIDODRINE HYDROCHLORIDE 5 MG/1
5 TABLET ORAL 3 TIMES DAILY PRN
Status: DISCONTINUED | OUTPATIENT
Start: 2025-01-08 | End: 2025-01-13 | Stop reason: HOSPADM

## 2025-01-08 RX ORDER — PANTOPRAZOLE SODIUM 40 MG/1
40 TABLET, DELAYED RELEASE ORAL
Status: DISCONTINUED | OUTPATIENT
Start: 2025-01-09 | End: 2025-01-13 | Stop reason: HOSPADM

## 2025-01-08 RX ADMIN — GUAIFENESIN 600 MG: 600 TABLET, EXTENDED RELEASE ORAL at 21:10

## 2025-01-08 RX ADMIN — BUSPIRONE HYDROCHLORIDE 15 MG: 15 TABLET ORAL at 09:09

## 2025-01-08 RX ADMIN — HYDROXYZINE HYDROCHLORIDE 50 MG: 50 TABLET ORAL at 21:10

## 2025-01-08 RX ADMIN — GUAIFENESIN 600 MG: 600 TABLET, EXTENDED RELEASE ORAL at 09:09

## 2025-01-08 RX ADMIN — ALUMINUM HYDROXIDE, MAGNESIUM HYDROXIDE, AND SIMETHICONE 30 ML: 200; 200; 20 SUSPENSION ORAL at 13:43

## 2025-01-08 RX ADMIN — BUSPIRONE HYDROCHLORIDE 15 MG: 15 TABLET ORAL at 21:10

## 2025-01-08 RX ADMIN — TRAZODONE HYDROCHLORIDE 50 MG: 50 TABLET ORAL at 21:10

## 2025-01-08 RX ADMIN — OLANZAPINE 10 MG: 10 TABLET, FILM COATED ORAL at 21:10

## 2025-01-08 RX ADMIN — HYDROXYZINE HYDROCHLORIDE 50 MG: 50 TABLET ORAL at 13:43

## 2025-01-08 RX ADMIN — BUSPIRONE HYDROCHLORIDE 15 MG: 15 TABLET ORAL at 13:43

## 2025-01-08 RX ADMIN — FERROUS SULFATE TAB 325 MG (65 MG ELEMENTAL FE) 325 MG: 325 (65 FE) TAB at 09:09

## 2025-01-08 RX ADMIN — Medication 1 TABLET: at 09:09

## 2025-01-08 RX ADMIN — PANTOPRAZOLE SODIUM 20 MG: 20 TABLET, DELAYED RELEASE ORAL at 09:09

## 2025-01-08 RX ADMIN — VORTIOXETINE 30 MG: 20 TABLET, FILM COATED ORAL at 09:09

## 2025-01-08 NOTE — BH NOTE
Patient arrived to the unit escorted by two [facility] staff. Patient oriented to unit, and all questions answered.

## 2025-01-08 NOTE — GROUP NOTE
Group Therapy Note    Date: 1/8/2025    Group Start Time: 1100  Group End Time: 1140  Group Topic: Cognitive Skills    [facility]    Melissa Crabtree CTRS        Group Therapy Note    Attendees: 6/16       Patient's Goal:  pt will demonstrate improved coping skills and improved interpersonal relationships     Notes:   pt was pleasant and participated well    Status After Intervention:  Improved    Participation Level: Active Listener and Interactive    Participation Quality: Appropriate, Sharing, and Supportive      Speech:  normal      Thought Process/Content: Logical      Affective Functioning: Congruent      Mood: euthymic      Level of consciousness:  Alert      Response to Learning: Able to verbalize current knowledge/experience, Capable of insight, and Progressing to goal      Endings: None Reported    Modes of Intervention: Education, Support, and Activity      Discipline Responsible: Psychoeducational Specialist      Signature:  MAGDALENE HUDDLESTON

## 2025-01-08 NOTE — PLAN OF CARE
Problem: Anxiety  Goal: Will report anxiety at manageable levels  Description: INTERVENTIONS:  1. Administer medication as ordered  2. Teach and rehearse alternative coping skills  3. Provide emotional support with 1:1 interaction with staff  1/8/2025 0049 by John Man, RN  Outcome: Progressing  Note: Patient reported both anxiety and depression to be 7/10. Patient get easily overwhelmed then panics. Patient need redirected for simple tasks. She was worried about choking on larger pills, to accommodate pills were split in half. Patient was in a better mood today, she remains aloof of peers in dayroom. She talked to family on the telephone in which made her brightened. She was medication compliant and ate snack     Problem: Self Harm/Suicidality  Goal: Will have no self-injury during hospital stay  Description: INTERVENTIONS:  1.  Ensure constant observer at bedside with Q15M safety checks  2.  Maintain a safe environment  3.  Secure patient belongings  4.  Ensure family/visitors adhere to safety recommendations  5.  Ensure safety tray has been added to patient's diet order  6.  Every shift and PRN: Re-assess suicidal risk via Frequent Screener    1/8/2025 0049 by John Man, RN  Outcome: Progressing  Note: Patient denied thoughts of hurting herself or others and remains free from self harm and injury       Problem: Safety - Adult  Goal: Free from fall injury  1/8/2025 0049 by John Man, RN  Outcome: Progressing

## 2025-01-08 NOTE — BH NOTE
Ms. Vazquez reported she had emesis on bathroom floor. She reports this happens at home at times after eating. Writer notified Charge nurse that patient would benefit from hospital bed with head and upper body elevation.

## 2025-01-08 NOTE — GROUP NOTE
Group Therapy Note    Date: 1/7/2025    Group Start Time: 2040  Group End Time: 2110  Group Topic: Wrap-Up    Deepika Medina, RN      Group Therapy Note    Attendees: 11/22    Patient's Goal(s):    1.  To be able to reflect on daily unit activities/experiences.  2.  To review accomplished daily goals and be encouraged to set new goals for the next day.  3.  To improve interpersonal interaction through socialization.        Notes:  Pt attended and actively participated in Wrap-Up/Goal Review group this evening.     Status After Intervention:  Improved     Participation Level: Active Listener and Interactive     Participation Quality: Appropriate, Attentive and Sharing     Speech:  normal     Thought Process/Content: Logical     Affective Functioning: Congruent     Mood: euthymic     Level of consciousness:  Alert, Oriented x4 and Attentive     Response to Learning: Able to verbalize current knowledge/experience, Able to verbalize/acknowledge new learning, Progressing to goal     Endings: None Reported     Modes of Intervention: Education, Support and Socialization     Discipline Responsible: Registered Nurse        Signature:  Deepika Franklin, RN

## 2025-01-08 NOTE — GROUP NOTE
Group Therapy Note    Date: 1/8/2025    Group Start Time: 0900  Group End Time: 0927  Group Topic: Community Meeting    Allie Flood LPN    Patient refused to attend community meeting due to resting in bed, refused 1;1 talk time       Signature:  Allie Diaz LPN

## 2025-01-08 NOTE — PLAN OF CARE
Problem: Anxiety  Goal: Will report anxiety at manageable levels  Description: INTERVENTIONS:  1. Administer medication as ordered  2. Teach and rehearse alternative coping skills  3. Provide emotional support with 1:1 interaction with staff  Note: Ms. Vazquez is visibly anxious and requires frequent reassurance and coaxing. Ms. Vazquez utilized her Atarax as prescribed.      Problem: Self Harm/Suicidality  Goal: Will have no self-injury during hospital stay  Description: INTERVENTIONS:  1.  Ensure constant observer at bedside with Q15M safety checks  2.  Maintain a safe environment  3.  Secure patient belongings  4.  Ensure family/visitors adhere to safety recommendations  5.  Ensure safety tray has been added to patient's diet order  6.  Every shift and PRN: Re-assess suicidal risk via Frequent Screener    Note: Ms. Vazquez denies suicidal ideation and thoughts of harm to self and others.      Problem: Safety - Adult  Goal: Free from fall injury  Note: Ms. Vazquez has remained free of falls during this shift. She ambulates with a slow steady gait and wears hospital issued non-slip socks while in the milieu. She was reminded to ask for help from staff if needed. She verbalized understanding.

## 2025-01-08 NOTE — GROUP NOTE
Group Therapy Note    Date: 1/8/2025    Group Start Time: 1330  Group End Time: 1405  Group Topic: Relaxation    Melissa Vera CTRS    Relaxation Group Note        Date:January 8, 2025   Start Time: 1:30pm  End Time:  205pm      Number of Participants in Group & Unit Census:  5/22    Topic: relaxation group     Goal of Group: pt will demonstrate improved coping skills by using art for relaxation       Comments:     Patient did not participate in Relaxation group, despite staff encouragement and explanation of benefits.  Patient remain seclusive to self.  Q15 minute safety checks maintained for patient safety and will continue to encourage patient to attend unit programming.              Signature:  MAGDALENE HUDDLESTON

## 2025-01-08 NOTE — GROUP NOTE
Group Therapy Note    Date: 1/8/2025    Group Start Time: 1000  Group End Time: 1045  Group Topic: Psychoeducation    Carmen Mock, KVNGW        Group Therapy Note    Attendees: 7/23       patient refused to attend psychoeducation group at 10a after encouragement from staff.  1:1 talk time provided as alternative to group session

## 2025-01-09 LAB
CHOLEST SERPL-MCNC: 162 MG/DL (ref 0–199)
CHOLESTEROL/HDL RATIO: 3.9
EST. AVERAGE GLUCOSE BLD GHB EST-MCNC: 114 MG/DL
HBA1C MFR BLD: 5.6 % (ref 4–6)
HDLC SERPL-MCNC: 42 MG/DL
LDLC SERPL CALC-MCNC: 98 MG/DL (ref 0–100)
POTASSIUM SERPL-SCNC: 3.4 MMOL/L (ref 3.7–5.3)
TRIGL SERPL-MCNC: 111 MG/DL (ref 0–149)

## 2025-01-09 PROCEDURE — 6370000000 HC RX 637 (ALT 250 FOR IP)

## 2025-01-09 PROCEDURE — 1240000000 HC EMOTIONAL WELLNESS R&B

## 2025-01-09 PROCEDURE — 84132 ASSAY OF SERUM POTASSIUM: CPT

## 2025-01-09 PROCEDURE — 99231 SBSQ HOSP IP/OBS SF/LOW 25: CPT

## 2025-01-09 PROCEDURE — 83036 HEMOGLOBIN GLYCOSYLATED A1C: CPT

## 2025-01-09 PROCEDURE — 6370000000 HC RX 637 (ALT 250 FOR IP): Performed by: PSYCHIATRY & NEUROLOGY

## 2025-01-09 PROCEDURE — 6370000000 HC RX 637 (ALT 250 FOR IP): Performed by: NURSE PRACTITIONER

## 2025-01-09 PROCEDURE — 99232 SBSQ HOSP IP/OBS MODERATE 35: CPT | Performed by: PSYCHIATRY & NEUROLOGY

## 2025-01-09 PROCEDURE — APPSS30 APP SPLIT SHARED TIME 16-30 MINUTES

## 2025-01-09 PROCEDURE — 80061 LIPID PANEL: CPT

## 2025-01-09 PROCEDURE — 36415 COLL VENOUS BLD VENIPUNCTURE: CPT

## 2025-01-09 RX ORDER — POTASSIUM CHLORIDE 1500 MG/1
40 TABLET, EXTENDED RELEASE ORAL ONCE
Status: COMPLETED | OUTPATIENT
Start: 2025-01-09 | End: 2025-01-09

## 2025-01-09 RX ADMIN — GUAIFENESIN 600 MG: 600 TABLET, EXTENDED RELEASE ORAL at 20:55

## 2025-01-09 RX ADMIN — HYDROXYZINE HYDROCHLORIDE 50 MG: 50 TABLET ORAL at 20:55

## 2025-01-09 RX ADMIN — POTASSIUM CHLORIDE 40 MEQ: 1500 TABLET, EXTENDED RELEASE ORAL at 15:23

## 2025-01-09 RX ADMIN — BUSPIRONE HYDROCHLORIDE 15 MG: 15 TABLET ORAL at 15:22

## 2025-01-09 RX ADMIN — Medication 1 TABLET: at 12:41

## 2025-01-09 RX ADMIN — MICONAZOLE NITRATE: 20 POWDER TOPICAL at 12:41

## 2025-01-09 RX ADMIN — VORTIOXETINE 30 MG: 20 TABLET, FILM COATED ORAL at 12:49

## 2025-01-09 RX ADMIN — FERROUS SULFATE TAB 325 MG (65 MG ELEMENTAL FE) 325 MG: 325 (65 FE) TAB at 12:41

## 2025-01-09 RX ADMIN — HYDROXYZINE HYDROCHLORIDE 50 MG: 50 TABLET ORAL at 12:41

## 2025-01-09 RX ADMIN — MICONAZOLE NITRATE: 20 POWDER TOPICAL at 20:56

## 2025-01-09 RX ADMIN — BUSPIRONE HYDROCHLORIDE 15 MG: 15 TABLET ORAL at 20:55

## 2025-01-09 RX ADMIN — PANTOPRAZOLE SODIUM 40 MG: 40 TABLET, DELAYED RELEASE ORAL at 17:21

## 2025-01-09 RX ADMIN — PANTOPRAZOLE SODIUM 40 MG: 40 TABLET, DELAYED RELEASE ORAL at 12:41

## 2025-01-09 RX ADMIN — TRAZODONE HYDROCHLORIDE 50 MG: 50 TABLET ORAL at 20:55

## 2025-01-09 RX ADMIN — BUSPIRONE HYDROCHLORIDE 15 MG: 15 TABLET ORAL at 12:41

## 2025-01-09 RX ADMIN — OLANZAPINE 10 MG: 10 TABLET, FILM COATED ORAL at 20:55

## 2025-01-09 RX ADMIN — GUAIFENESIN 600 MG: 600 TABLET, EXTENDED RELEASE ORAL at 12:41

## 2025-01-09 NOTE — GROUP NOTE
Group Therapy Note    Date: 1/9/2025    Group Start Time: 1000  Group End Time: 1040  Group Topic: Psychotherapy     Pooja Lopez MSW        Group Therapy Note    Attendees: 4/11     Patient was offered group therapy today but declined to participate despite encouragement from staff.  1:1 was offered.    Discipline Responsible: /Counselor      Signature:  CUONG Beltrán

## 2025-01-09 NOTE — PLAN OF CARE
[facility]  Day 3 Interdisciplinary Treatment Plan NOTE    Review Date & Time: 1/9/2025       Admission Type:   Admission Type: Voluntary    Reason for admission:  Reason for Admission: Patient reports increased depression and anxiety. She reports triggers being her nieces wife who is \"mean \" to her whom she resides with. Patient reports suicidal thoughts to go to sleep and not wake up. She stated thoughts of overdosing on medications but scared it wouldnt work  Estimated Length of Stay:  5-7 days  Estimated Discharge Date Update:  to be determined by physician    PATIENT STRENGTHS:  Patient Strengths:   Patient Strengths and Limitations:Limitations: Difficulty problem solving/relies on others to help solve problems, Apathetic / unmotivated  Addictive Behavior:Addictive Behavior  In the Past 3 Months, Have You Felt or Has Someone Told You That You Have a Problem With  : None  Medical Problems:  Past Medical History:   Diagnosis Date    Asthma     GERD (gastroesophageal reflux disease)     Iron deficiency anemia     Schizoaffective disorder, bipolar type (HCC)        Risk:  Fall Risk   Chandler Scale Chandler Scale Score: 22  BVC    Change in scores:  No Changes to plan of Care:  No    Status EXAM:   Mental Status and Behavioral Exam  Normal: No  Level of Assistance: Independent/Self  Facial Expression: Worried, Brightened  Affect: Unstable  Level of Consciousness: Alert  Frequency of Checks: 4 times per hour, close  Mood:Normal: No  Mood: Depressed, Anxious, Helpless  Motor Activity:Normal: No  Motor Activity: Decreased  Eye Contact: Fair  Observed Behavior: Cooperative, Friendly  Sexual Misconduct History: Current - no  Preception: Leslie to person, Leslie to time, Leslie to place, Leslie to situation  Attention:Normal: No  Attention: Distractible  Thought Processes: Unremarkable  Thought Content:Normal: No  Thought Content: Poverty of content, Preoccupations  Depression Symptoms: Feelings of

## 2025-01-09 NOTE — PLAN OF CARE
Problem: Self Harm/Suicidality  Goal: Will have no self-injury during hospital stay  Description: INTERVENTIONS:  1.  Ensure constant observer at bedside with Q15M safety checks  2.  Maintain a safe environment  3.  Secure patient belongings  4.  Ensure family/visitors adhere to safety recommendations  5.  Ensure safety tray has been added to patient's diet order  6.  Every shift and PRN: Re-assess suicidal risk via Frequent Screener    1/9/2025 1620 by Megan Pittman LPN  Outcome: Progressing  Flowsheets (Taken 1/9/2025 1620)  Will have no self-injury during hospital stay:   Maintain a safe environment   Secure patient belongings   Every shift and PRN: Re-assess suicidal risk via Frequent Screener  Note: Patient denies thoughts of harm to self or others and denies hallucinations. Patient has been isolative to room and slept most of shift despite encouragement. Patient has been pleasant, took all scheduled morning meds after lunch and has been in behavioral control. Safe environment maintained.  Q15 minute checks for safety contued per unit policy.  Will continue to monitor for safety and provides support and reassurance as needed.    1/9/2025 1503 by Margret Sagastume, RN  Outcome: [facility] [place] Progressing     Problem: Safety - Adult  Goal: Free from fall injury  1/9/2025 1620 by Megan Pittman LPN  Outcome: Progressing  Note: Patient remains free of falls and verbalizes understanding of individual fall risks. Patient is wearing non skid footwear and encouraged to seek out staff for any assistance needed.    1/9/2025 1503 by Margret Sagastume, RN  Outcome: /[facility] [place] Progressing

## 2025-01-09 NOTE — PLAN OF CARE
Problem: Anxiety  Goal: Will report anxiety at manageable levels  Description: INTERVENTIONS:  1. Administer medication as ordered  2. Teach and rehearse alternative coping skills  3. Provide emotional support with 1:1 interaction with staff  1/8/2025 2214 by Carmen Vanessa RN  Outcome: Progressing     Problem: Self Harm/Suicidality  Goal: Will have no self-injury during hospital stay  Description: INTERVENTIONS:  1.  Ensure constant observer at bedside with Q15M safety checks  2.  Maintain a safe environment  3.  Secure patient belongings  4.  Ensure family/visitors adhere to safety recommendations  5.  Ensure safety tray has been added to patient's diet order  6.  Every shift and PRN: Re-assess suicidal risk via Frequent Screener    1/8/2025 2214 by Carmen Vanessa RN  Outcome: Progressing     Patient alert and oriented. Patient remains free from self harm throughout this shift. Patient agrees to seek out staff if thoughts of self harm arise.  Patient endorses some symptoms of anxiety and depression but states they are improving at this time.  Patient is mostly isolative to room this evening. Patient is medication compliant and behavior remains controlled at this time.  Safe environment provided. Q15 minute checks maintained.

## 2025-01-09 NOTE — GROUP NOTE
Group Therapy Note    Date: 1/9/2025    Group Start Time: 1430  Group End Time: 1510  Group Topic: Cognitive Skills    Michelle Lambert CTRS        Group Therapy Note    Attendees: 3/11    Cognitive Skills Group Note        Date: January 9, 2025 Start Time: 2:30pm  End Time: 3:10pm      Number of Participants in Group & Unit Census:  3/11    Topic:  interpersonal skills, memory recall, self-expression     Goal of Group: To improve interpersonal skills and memory recall through collaborating with peers and demonstrating self-expression.      Comments:     Patient did not participate in Cognitive Skills group, despite staff encouragement and explanation of benefits.  Patient remain seclusive to self.  Q15 minute safety checks maintained for patient safety and will continue to encourage patient to attend unit programming.        Signature:  MAGDALENE Benjamin

## 2025-01-09 NOTE — GROUP NOTE
Group Therapy Note    Date: 1/9/2025    Group Start Time: 1100  Group End Time: 1135  Group Topic: Cognitive Skills    Michelle Lambert CTRS        Group Therapy Note    Attendees: 6/11    Cognitive Skills Group Note        Date: January 9, 2025 Start Time: 11am  End Time: 11:35am      Number of Participants in Group & Unit Census:  6/11    Topic:  interpersonal skills, decision-making, concentration     Goal of Group: To improve interpersonal skills and decision-making through collaborating with peers and concentrating on a presented task.       Comments:     Patient did not participate in Cognitive Skills group, despite staff encouragement and explanation of benefits.  Patient remain seclusive to self.  Q15 minute safety checks maintained for patient safety and will continue to encourage patient to attend unit programming.        Signature:  MAGDALENE Benjamin

## 2025-01-10 LAB — POTASSIUM SERPL-SCNC: 3.9 MMOL/L (ref 3.7–5.3)

## 2025-01-10 PROCEDURE — 84132 ASSAY OF SERUM POTASSIUM: CPT

## 2025-01-10 PROCEDURE — 36415 COLL VENOUS BLD VENIPUNCTURE: CPT

## 2025-01-10 PROCEDURE — 6370000000 HC RX 637 (ALT 250 FOR IP)

## 2025-01-10 PROCEDURE — 1240000000 HC EMOTIONAL WELLNESS R&B

## 2025-01-10 PROCEDURE — 6370000000 HC RX 637 (ALT 250 FOR IP): Performed by: PSYCHIATRY & NEUROLOGY

## 2025-01-10 PROCEDURE — 99232 SBSQ HOSP IP/OBS MODERATE 35: CPT | Performed by: PSYCHIATRY & NEUROLOGY

## 2025-01-10 PROCEDURE — 6370000000 HC RX 637 (ALT 250 FOR IP): Performed by: NURSE PRACTITIONER

## 2025-01-10 PROCEDURE — APPSS30 APP SPLIT SHARED TIME 16-30 MINUTES: Performed by: NURSE PRACTITIONER

## 2025-01-10 RX ADMIN — GUAIFENESIN 600 MG: 600 TABLET, EXTENDED RELEASE ORAL at 20:56

## 2025-01-10 RX ADMIN — BUSPIRONE HYDROCHLORIDE 15 MG: 15 TABLET ORAL at 08:45

## 2025-01-10 RX ADMIN — BUSPIRONE HYDROCHLORIDE 15 MG: 15 TABLET ORAL at 20:56

## 2025-01-10 RX ADMIN — BUSPIRONE HYDROCHLORIDE 15 MG: 15 TABLET ORAL at 15:15

## 2025-01-10 RX ADMIN — HYDROXYZINE HYDROCHLORIDE 50 MG: 50 TABLET ORAL at 20:56

## 2025-01-10 RX ADMIN — OLANZAPINE 10 MG: 10 TABLET, FILM COATED ORAL at 20:56

## 2025-01-10 RX ADMIN — TRAZODONE HYDROCHLORIDE 50 MG: 50 TABLET ORAL at 20:56

## 2025-01-10 RX ADMIN — PANTOPRAZOLE SODIUM 40 MG: 40 TABLET, DELAYED RELEASE ORAL at 08:44

## 2025-01-10 RX ADMIN — FERROUS SULFATE TAB 325 MG (65 MG ELEMENTAL FE) 325 MG: 325 (65 FE) TAB at 08:45

## 2025-01-10 RX ADMIN — VORTIOXETINE 30 MG: 20 TABLET, FILM COATED ORAL at 08:58

## 2025-01-10 RX ADMIN — GUAIFENESIN 600 MG: 600 TABLET, EXTENDED RELEASE ORAL at 08:45

## 2025-01-10 RX ADMIN — PANTOPRAZOLE SODIUM 40 MG: 40 TABLET, DELAYED RELEASE ORAL at 17:02

## 2025-01-10 RX ADMIN — MICONAZOLE NITRATE: 20 POWDER TOPICAL at 20:57

## 2025-01-10 RX ADMIN — Medication 1 TABLET: at 08:45

## 2025-01-10 RX ADMIN — MICONAZOLE NITRATE: 20 POWDER TOPICAL at 09:06

## 2025-01-10 NOTE — PLAN OF CARE
Problem: Anxiety  Goal: Will report anxiety at manageable levels  Description: INTERVENTIONS:  1. Administer medication as ordered  2. Teach and rehearse alternative coping skills  3. Provide emotional support with 1:1 interaction with staff  Outcome: Progressing     Problem: Safety - Adult  Goal: Free from fall injury  Outcome: Progressing     Problem: Self Harm/Suicidality  Goal: Will have no self-injury during hospital stay  Description: INTERVENTIONS:  1.  Ensure constant observer at bedside with Q15M safety checks  2.  Maintain a safe environment  3.  Secure patient belongings  4.  Ensure family/visitors adhere to safety recommendations  5.  Ensure safety tray has been added to patient's diet order  6.  Every shift and PRN: Re-assess suicidal risk via Frequent Screener    Note: Patient denies suicidal/homicidal ideations but reports some anxiety and depression. Patients is cooperative and pleasant with a brighter mood she has been seclusive to her room but able to verbalize her needs. Patient voices no concerns as Q15 minute safety checks continue to be conducted. Will continue to monitor.      .

## 2025-01-10 NOTE — GROUP NOTE
Group Therapy Note    Date: 1/10/2025    Group Start Time: 1100  Group End Time: 1140  Group Topic: Psychoeducation    Michelle Lambert CTRS        Group Therapy Note    Attendees: 4/11    Psych-Ed/Relapse Prevention Group Note        Date: January 10, 2025 Start Time: 11am  End Time: 11:40am      Number of Participants in Group & Unit Census:  4/11    Topic:  interpersonal skills, gratitude, creative expression     Goal of Group: To improve interpersonal skills and gratitude awareness through collaborating with peers and demonstrating creative expression.      Comments:     Patient did not participate in Psych-Ed/Relapse Prevention group, despite staff encouragement and explanation of benefits.  Patient remain seclusive to self.  Q15 minute safety checks maintained for patient safety and will continue to encourage patient to attend unit programming.        Signature:  MAGDALENE Benjamin

## 2025-01-10 NOTE — GROUP NOTE
Group Therapy Note    Date: 1/10/2025    Group Start Time: 0900  Group End Time: 0930  Group Topic: Orientation Group    STMarcella Payne LPN        Group Therapy Note    Attendees: 4/10       Patient's Goal:   Orientation/ Goals    Notes:    Patient able to verbalize the understanding of setting goals.    Status After Intervention:  Improved    Participation Level: Active Listener    Participation Quality: Appropriate, Attentive, and Sharing      Speech:  normal      Thought Process/Content: Logical      Affective Functioning: Congruent      Mood: anxious      Level of consciousness:  Alert      Response to Learning: Able to verbalize current knowledge/experience, Able to verbalize/acknowledge new learning, and Able to retain information      Endings: None Reported    Modes of Intervention: Education, Support, and Socialization      Discipline Responsible: Licensed Practical Nurse      Signature:  Marcella James LPN

## 2025-01-10 NOTE — PLAN OF CARE
Problem: Self Harm/Suicidality  Goal: Will have no self-injury during hospital stay  Description: INTERVENTIONS:  1.  Ensure constant observer at bedside with Q15M safety checks  2.  Maintain a safe environment  3.  Secure patient belongings  4.  Ensure family/visitors adhere to safety recommendations  5.  Ensure safety tray has been added to patient's diet order  6.  Every shift and PRN: Re-assess suicidal risk via Frequent Screener    1/9/2025 2016 by Carmen Cárdenas LPN  Note: Patient denies thoughts of self harm at this time. Patient agrees to seek out staff if they begin having thoughts of harming self or they need to talk. Q15min safety checks continue     Problem: Anxiety  Goal: Will report anxiety at manageable levels  Description: INTERVENTIONS:  1. Administer medication as ordered  2. Teach and rehearse alternative coping skills  3. Provide emotional support with 1:1 interaction with staff  1/9/2025 2016 by Carmen Cárdenas LPN  Note: Patient denies suicidal thoughts and hallucinations. She reports depression and anxiety are improved, and she stated she has been feeling better. She has been mostly isolative to her room but brightened and calm. Q15min safety checks continue

## 2025-01-10 NOTE — PROGRESS NOTES
Physician Progress Note      PATIENT:               MOY CRENSHAW  CSN #:                  [id number]  :                       1965  ADMIT DATE:       2025 6:33 AM  DISCH DATE:        2025 8:00 PM  RESPONDING  PROVIDER #:        Moisés Perez MD          QUERY TEXT:    Pt admitted with near syncopal episode.  Noted documentation of POTS likely,   Increase fluids, Compression stockings on  by Cardiology consultant.  If   possible, please document in progress notes and discharge summary:    The medical record reflects the following:  Risk Factors: Near syncope, Anemia, anxiety  Clinical Indicators:  blood pressure 102/56>103/58>94/63,HR 97, RR 25>33, EKG   ; NSR, trop 16>12  Treatment: Increase fluids.  Compression stockings, ECHO, Consider low dose BB   and midodrine if symptoms continue    Thank you, Please epic in box message if questions  Latasha Gaitan RN CDS  Options provided:  -- POTS confirmed present on admission  -- POTS ruled out  -- Defer to cardiology consultant documentation regarding  POTS  -- Other - I will add my own diagnosis  -- Disagree - Not applicable / Not valid  -- Disagree - Clinically unable to determine / Unknown  -- Refer to Clinical Documentation Reviewer    PROVIDER RESPONSE TEXT:    I defer to cardiology consultant regarding documentation of POTS.    Query created by: Latasha Wood on 2025 5:59 AM      Electronically signed by:  Moisés Perez MD 1/10/2025 3:53 PM

## 2025-01-11 PROCEDURE — 6370000000 HC RX 637 (ALT 250 FOR IP)

## 2025-01-11 PROCEDURE — 99232 SBSQ HOSP IP/OBS MODERATE 35: CPT | Performed by: PSYCHIATRY & NEUROLOGY

## 2025-01-11 PROCEDURE — 6370000000 HC RX 637 (ALT 250 FOR IP): Performed by: NURSE PRACTITIONER

## 2025-01-11 PROCEDURE — 1240000000 HC EMOTIONAL WELLNESS R&B

## 2025-01-11 PROCEDURE — 6370000000 HC RX 637 (ALT 250 FOR IP): Performed by: PSYCHIATRY & NEUROLOGY

## 2025-01-11 PROCEDURE — APPSS30 APP SPLIT SHARED TIME 16-30 MINUTES: Performed by: NURSE PRACTITIONER

## 2025-01-11 PROCEDURE — 99232 SBSQ HOSP IP/OBS MODERATE 35: CPT | Performed by: INTERNAL MEDICINE

## 2025-01-11 RX ADMIN — GUAIFENESIN 600 MG: 600 TABLET, EXTENDED RELEASE ORAL at 08:51

## 2025-01-11 RX ADMIN — GUAIFENESIN 600 MG: 600 TABLET, EXTENDED RELEASE ORAL at 21:03

## 2025-01-11 RX ADMIN — Medication 1 TABLET: at 08:50

## 2025-01-11 RX ADMIN — HYDROXYZINE HYDROCHLORIDE 50 MG: 50 TABLET ORAL at 08:50

## 2025-01-11 RX ADMIN — FERROUS SULFATE TAB 325 MG (65 MG ELEMENTAL FE) 325 MG: 325 (65 FE) TAB at 08:48

## 2025-01-11 RX ADMIN — BUSPIRONE HYDROCHLORIDE 15 MG: 15 TABLET ORAL at 14:16

## 2025-01-11 RX ADMIN — BUSPIRONE HYDROCHLORIDE 15 MG: 15 TABLET ORAL at 08:50

## 2025-01-11 RX ADMIN — BUSPIRONE HYDROCHLORIDE 15 MG: 15 TABLET ORAL at 21:03

## 2025-01-11 RX ADMIN — HYDROXYZINE HYDROCHLORIDE 50 MG: 50 TABLET ORAL at 16:55

## 2025-01-11 RX ADMIN — OLANZAPINE 10 MG: 10 TABLET, FILM COATED ORAL at 21:03

## 2025-01-11 RX ADMIN — PANTOPRAZOLE SODIUM 40 MG: 40 TABLET, DELAYED RELEASE ORAL at 07:02

## 2025-01-11 RX ADMIN — PANTOPRAZOLE SODIUM 40 MG: 40 TABLET, DELAYED RELEASE ORAL at 16:14

## 2025-01-11 RX ADMIN — VORTIOXETINE 30 MG: 20 TABLET, FILM COATED ORAL at 08:49

## 2025-01-11 NOTE — GROUP NOTE
Group Therapy Note    Date: 1/11/2025    Group Start Time: 1330  Group End Time: 1400  Group Topic: Cognitive Skills    Melissa Doyle CTRS    Cognitive Skills Group Note        Date: January 11, 2025 Start Time: 1:30pm  End Time:  200 pm      Number of Participants in Group & Unit Census:  3/9    Topic: cognitive skills     Goal of Group: pt will improve coping skills and improved concentration.       Comments:     Patient did not participate in Cognitive Skills group, despite staff encouragement and explanation of benefits.  Patient remain seclusive to self.  Q15 minute safety checks maintained for patient safety and will continue to encourage patient to attend unit programming.              Signature:  MAGDALENE HUDDLESTON

## 2025-01-11 NOTE — PLAN OF CARE
Problem: Anxiety  Goal: Will report anxiety at manageable levels  Description: INTERVENTIONS:  1. Administer medication as ordered  2. Teach and rehearse alternative coping skills  3. Provide emotional support with 1:1 interaction with staff  Outcome: Progressing    Help pt with self soothing and coping skills to reduce anxiety  and give PRN meds as needed.     Problem: Self Harm/Suicidality  Goal: Will have no self-injury during hospital stay  Description: INTERVENTIONS:  1.  Ensure constant observer at bedside with Q15M safety checks  2.  Maintain a safe environment  3.  Secure patient belongings  4.  Ensure family/visitors adhere to safety recommendations  5.  Ensure safety tray has been added to patient's diet order  6.  Every shift and PRN: Re-assess suicidal risk via Frequent Screener  Encourage patient to talk openly, Listen without judgement, connect them to professional help or trusted system immediately.       Problem: Safety - Adult  Goal: Free from fall injury  Outcome: Progressing     Encourage pt to wear socks with grippers and use of Cane/walker to reduce the risk of falls.

## 2025-01-11 NOTE — GROUP NOTE
Group Therapy Note    Date: 1/11/2025    Group Start Time: 0900  Group End Time: 0905  Group Topic: Daily Inventory Group    CZ BHNORBERTO G    Mandie Garibay LPN        Group Therapy Note    Attendees: 0/8     Patient did not participate in goals} group, despite staff encouragement and explanation of benefits.  Patient remain seclusive to self.  Q15 minute safety checks maintained for patient safety and will continue to encourage patient to attend unit programming.      Discipline Responsible: Licensed Practical Nurse      Signature:  Mandie Garibay LPN

## 2025-01-11 NOTE — GROUP NOTE
Group Therapy Note    Date: 1/11/2025    Group Start Time: 1000  Group End Time: 1020  Group Topic: Psychotherapy     Pooja Lopez MSW        Group Therapy Note    Attendees: 4/8     Patient was offered group therapy today but declined to participate despite encouragement from staff.  1:1 was offered.    Discipline Responsible: /Counselor      Signature:  CUONG Beltrán

## 2025-01-12 PROCEDURE — 6370000000 HC RX 637 (ALT 250 FOR IP): Performed by: NURSE PRACTITIONER

## 2025-01-12 PROCEDURE — 99232 SBSQ HOSP IP/OBS MODERATE 35: CPT | Performed by: PSYCHIATRY & NEUROLOGY

## 2025-01-12 PROCEDURE — 6370000000 HC RX 637 (ALT 250 FOR IP): Performed by: PSYCHIATRY & NEUROLOGY

## 2025-01-12 PROCEDURE — 6370000000 HC RX 637 (ALT 250 FOR IP)

## 2025-01-12 PROCEDURE — 99231 SBSQ HOSP IP/OBS SF/LOW 25: CPT | Performed by: INTERNAL MEDICINE

## 2025-01-12 PROCEDURE — 1240000000 HC EMOTIONAL WELLNESS R&B

## 2025-01-12 RX ADMIN — MICONAZOLE NITRATE: 20 POWDER TOPICAL at 08:44

## 2025-01-12 RX ADMIN — BUSPIRONE HYDROCHLORIDE 15 MG: 15 TABLET ORAL at 20:51

## 2025-01-12 RX ADMIN — BUSPIRONE HYDROCHLORIDE 15 MG: 15 TABLET ORAL at 08:45

## 2025-01-12 RX ADMIN — PANTOPRAZOLE SODIUM 40 MG: 40 TABLET, DELAYED RELEASE ORAL at 08:47

## 2025-01-12 RX ADMIN — GUAIFENESIN 600 MG: 600 TABLET, EXTENDED RELEASE ORAL at 20:51

## 2025-01-12 RX ADMIN — TRAZODONE HYDROCHLORIDE 50 MG: 50 TABLET ORAL at 20:51

## 2025-01-12 RX ADMIN — OLANZAPINE 10 MG: 10 TABLET, FILM COATED ORAL at 20:51

## 2025-01-12 RX ADMIN — GUAIFENESIN 600 MG: 600 TABLET, EXTENDED RELEASE ORAL at 08:47

## 2025-01-12 RX ADMIN — Medication 1 TABLET: at 08:47

## 2025-01-12 RX ADMIN — PANTOPRAZOLE SODIUM 40 MG: 40 TABLET, DELAYED RELEASE ORAL at 17:22

## 2025-01-12 RX ADMIN — MICONAZOLE NITRATE: 20 POWDER TOPICAL at 21:23

## 2025-01-12 RX ADMIN — BUSPIRONE HYDROCHLORIDE 15 MG: 15 TABLET ORAL at 13:23

## 2025-01-12 RX ADMIN — VORTIOXETINE 30 MG: 20 TABLET, FILM COATED ORAL at 08:46

## 2025-01-12 RX ADMIN — FERROUS SULFATE TAB 325 MG (65 MG ELEMENTAL FE) 325 MG: 325 (65 FE) TAB at 08:48

## 2025-01-12 NOTE — BH NOTE
SUNDAY RM SAFETY CHECKS    Pt participates in Sunday Rm safety checks without issue. Pt Rm free of clutter/contraband/items not belonging in Pt Rm.

## 2025-01-12 NOTE — PLAN OF CARE
Problem: Self Harm/Suicidality  Goal: Will have no self-injury during hospital stay  Description: INTERVENTIONS:  1.  Ensure constant observer at bedside with Q15M safety checks  2.  Maintain a safe environment  3.  Secure patient belongings  4.  Ensure family/visitors adhere to safety recommendations  5.  Ensure safety tray has been added to patient's diet order  6.  Every shift and PRN: Re-assess suicidal risk via Frequent Screener    1/11/2025 2120 by Teresa Key, RN  Outcome: Progressing     Problem: Safety - Adult  Goal: Free from fall injury  1/11/2025 2120 by Tersea Key, RN  Outcome: Progressing

## 2025-01-12 NOTE — BH NOTE
.Patient was encouraged to attend morning relaxation group pt declined group at this time. Gurmeet will continue to be encouraged to attend groups

## 2025-01-12 NOTE — PLAN OF CARE
Problem: Anxiety  Goal: Will report anxiety at manageable levels  Description: INTERVENTIONS:  1. Administer medication as ordered  2. Teach and rehearse alternative coping skills  3. Provide emotional support with 1:1 interaction with staff  Outcome: Progressing    Help pt with coping skills     Problem: Self Harm/Suicidality  Goal: Will have no self-injury during hospital stay  Description: INTERVENTIONS:  1.  Ensure constant observer at bedside with Q15M safety checks  2.  Maintain a safe environment  3.  Secure patient belongings  4.  Ensure family/visitors adhere to safety recommendations  5.  Ensure safety tray has been added to patient's diet order  6.  Every shift and PRN: Re-assess suicidal risk via Frequent Screener    Encourage patient to talk openly, Listen without judgement, connect them to professional help or trusted system immediately.   Outcome: Progressing     Problem: Safety - Adult  Goal: Free from fall injury  Outcome: Progressing     Keep pt safe free from falls, keep clear pathway.

## 2025-01-12 NOTE — GROUP NOTE
Group Therapy Note    Date: 1/12/2025    Group Start Time: 1000  Group End Time: 1045  Group Topic: Cognitive Skills    Melissa Vera CTRS    Cognitive Skills Group Note        Date: January 12, 2025 Start Time:  1000am    End Time: 10:45am      Number of Participants in Group & Unit Census:  3/10    Topic: cognitive skills     Goal of Group: 3/10      Comments:     Patient did not participate in Cognitive Skills group, despite staff encouragement and explanation of benefits.  Patient remain seclusive to self.  Q15 minute safety checks maintained for patient safety and will continue to encourage patient to attend unit programming.              Signature:  MAGDALENE HUDDLESTON

## 2025-01-13 VITALS
DIASTOLIC BLOOD PRESSURE: 79 MMHG | RESPIRATION RATE: 14 BRPM | HEART RATE: 91 BPM | TEMPERATURE: 98.3 F | SYSTOLIC BLOOD PRESSURE: 126 MMHG | WEIGHT: 178 LBS | OXYGEN SATURATION: 95 % | HEIGHT: 62 IN | BODY MASS INDEX: 32.76 KG/M2

## 2025-01-13 PROCEDURE — 6370000000 HC RX 637 (ALT 250 FOR IP)

## 2025-01-13 PROCEDURE — 99231 SBSQ HOSP IP/OBS SF/LOW 25: CPT | Performed by: INTERNAL MEDICINE

## 2025-01-13 PROCEDURE — 6370000000 HC RX 637 (ALT 250 FOR IP): Performed by: PSYCHIATRY & NEUROLOGY

## 2025-01-13 PROCEDURE — 99239 HOSP IP/OBS DSCHRG MGMT >30: CPT | Performed by: PSYCHIATRY & NEUROLOGY

## 2025-01-13 RX ORDER — FERROUS SULFATE 325(65) MG
325 TABLET ORAL
Qty: 30 TABLET | Refills: 3 | Status: SHIPPED | OUTPATIENT
Start: 2025-01-13

## 2025-01-13 RX ORDER — M-VIT,TX,IRON,MINS/CALC/FOLIC 27MG-0.4MG
1 TABLET ORAL DAILY
Qty: 30 TABLET | Refills: 0 | Status: SHIPPED | OUTPATIENT
Start: 2025-01-14

## 2025-01-13 RX ORDER — OLANZAPINE 10 MG/1
10 TABLET ORAL NIGHTLY
Qty: 30 TABLET | Refills: 3 | Status: SHIPPED | OUTPATIENT
Start: 2025-01-13

## 2025-01-13 RX ORDER — BUSPIRONE HYDROCHLORIDE 15 MG/1
15 TABLET ORAL 3 TIMES DAILY
Qty: 90 TABLET | Refills: 0 | Status: SHIPPED | OUTPATIENT
Start: 2025-01-13

## 2025-01-13 RX ORDER — TRAZODONE HYDROCHLORIDE 50 MG/1
50 TABLET, FILM COATED ORAL NIGHTLY PRN
Qty: 30 TABLET | Refills: 0 | Status: SHIPPED | OUTPATIENT
Start: 2025-01-13

## 2025-01-13 RX ORDER — MIDODRINE HYDROCHLORIDE 5 MG/1
5 TABLET ORAL 3 TIMES DAILY PRN
Qty: 90 TABLET | Refills: 3 | Status: SHIPPED | OUTPATIENT
Start: 2025-01-13

## 2025-01-13 RX ADMIN — VORTIOXETINE 30 MG: 20 TABLET, FILM COATED ORAL at 08:36

## 2025-01-13 RX ADMIN — GUAIFENESIN 600 MG: 600 TABLET, EXTENDED RELEASE ORAL at 08:36

## 2025-01-13 RX ADMIN — FERROUS SULFATE TAB 325 MG (65 MG ELEMENTAL FE) 325 MG: 325 (65 FE) TAB at 08:21

## 2025-01-13 RX ADMIN — MICONAZOLE NITRATE: 20 POWDER TOPICAL at 08:36

## 2025-01-13 RX ADMIN — PANTOPRAZOLE SODIUM 40 MG: 40 TABLET, DELAYED RELEASE ORAL at 08:21

## 2025-01-13 RX ADMIN — Medication 1 TABLET: at 08:36

## 2025-01-13 RX ADMIN — BUSPIRONE HYDROCHLORIDE 15 MG: 15 TABLET ORAL at 15:03

## 2025-01-13 RX ADMIN — BUSPIRONE HYDROCHLORIDE 15 MG: 15 TABLET ORAL at 08:36

## 2025-01-13 NOTE — TRANSITION OF CARE
tablet  Commonly known as: BUSPAR  Take 15 mg by mouth 3 times daily  Notes to patient: For anxiety relief      ferrous sulfate 325 (65 Fe) MG tablet  Commonly known as: IRON 325  Take 1 tablet by mouth daily (with breakfast)  Notes to patient: Iron supplement      OLANZapine 10 MG tablet  Commonly known as: ZYPREXA  Take 1 tablet by mouth nightly  Notes to patient: Helps with moods and sleep     pantoprazole 20 MG tablet  Commonly known as: PROTONIX  Notes to patient: Help with heartburn and GERD     traZODone 50 MG tablet  Commonly known as: DESYREL  Take 1 tablet by mouth nightly as needed for Sleep  Notes to patient: For sleep      VORTIoxetine 10 MG Tabs tablet  Commonly known as: TRINTELLIX  Take 3 tablets by mouth daily  Notes to patient: Antidepressant            STOP taking these medications      Breo Ellipta 200-25 MCG/ACT Aepb inhaler  Generic drug: fluticasone furoate-vilanterol     docusate sodium 100 MG capsule  Commonly known as: COLACE     multivitamin tablet  Replaced by: therapeutic multivitamin-minerals tablet     omeprazole 40 MG delayed release capsule  Commonly known as: PRILOSEC     Vitamin D3 1.25 MG (14551 UT) Caps               Where to Get Your Medications        These medications were sent to [facility] [address] - [place], [place] - [address] - P [phone] - F [phone]  [facility] [address] [place] [address]      Phone: [phone]   busPIRone 15 MG tablet  ferrous sulfate 325 (65 Fe) MG tablet  miconazole 2 % powder  midodrine 5 MG tablet  OLANZapine 10 MG tablet  therapeutic multivitamin-minerals tablet  traZODone 50 MG tablet     Pharmacy Instructions:    Filled at [facility]            Unresulted Labs (24h ago, onward)      None            To obtain results of studies pending at discharge, please contact [phone]    Follow-up Information       Follow up With Specialties Details Why Contact [facility]  Go on 1/15/2025 At

## 2025-01-13 NOTE — GROUP NOTE
Group Therapy Note     Date: 1/13/2025       Group Start Time: 0800  Group End Time: 0805  Group Topic: Orientation Group     JORDAN Johnson RN           Group Therapy Note     Attendees: 2/10     Patient attended morning orientation group and actively listened while educated on unit policies, expectations, and orientation. Patient was reminded of group times, location of white board, today's staff, shower location/time, group time limitations at nurses station, phone and tv times. Patient educated on discharge process and planning as well as informed that all patient's are seen by a MD or NP every day.            Signature: Elizabeth HIGGINS RN

## 2025-01-13 NOTE — GROUP NOTE
Group Therapy Note    Date: 1/13/2025    Group Start Time: 1430  Group End Time: 1500  Group Topic: Psychoeducation    STCZ Michelle Miramontes CTRS        Group Therapy Note    Attendees: 3/7       Patient's Goal:  To improve interpersonal skills and coping skills awareness through collaborating with peers and demonstrating self-expression.    Notes:  Patient attended and participated in group. Patient was able to collaborate with peers and demonstrate self-expression. Patient was able to identify coping skills. Patient was pleasant and cooperative.     Status After Intervention:  Improved    Participation Level: Active Listener and Interactive    Participation Quality: Appropriate, Attentive, and Sharing      Speech:  normal      Thought Process/Content: Logical and Linear      Affective Functioning: Congruent      Mood: euthymic      Level of consciousness:  Alert and Attentive      Response to Learning: Able to verbalize current knowledge/experience, Able to retain information, Capable of insight, and Progressing to goal      Endings: None Reported    Modes of Intervention: Education, Support, and Socialization      Discipline Responsible: Psychoeducational Specialist      Signature:  MAGDALENE Benjamin

## 2025-01-13 NOTE — GROUP NOTE
Group Therapy Note    Date: 1/13/2025    Group Start Time: 1100  Group End Time: 1140  Group Topic: Cognitive Skills    Michelle Lambert CTRS        Group Therapy Note    Attendees: 5/10    Cognitive Skills Group Note        Date: January 13, 2025   Start Time: 11am  End Time: 11:40am      Number of Participants in Group & Unit Census:  5/10    Topic:  interpersonal skills, memory recall, self-expression     Goal of Group: To improve interpersonal skills and memory recall through collaborating with peers and demonstrating self-expression.      Comments:     Patient did not participate in Cognitive Skills group, despite staff encouragement and explanation of benefits.  Patient remain seclusive to self.  Q15 minute safety checks maintained for patient safety and will continue to encourage patient to attend unit programming.        Signature:  MAGDALENE Benjamin

## 2025-01-13 NOTE — DISCHARGE INSTRUCTIONS
HELP program [phone]     Suicide Hotline ([facility] Crisis Care Line)  (174) [phone]       To obtain results of pending studies call Medical Records at: [phone]     For emergencies and 24 hour/7 days a week contact information:  [phone]    Substance Use Treatment options provided by [employer] [facility] - a website/ty sponsored by the Mental Health & Recovery Services Board of [facility]. For more information please visit [email or web address]    *SANTOS resources were offered to patient throughout admission and at time of discharge. This list of [facility] SANTOS providers was provided to patient:     [facility]  [address] [facility] [address]    [address] [facility] [address]  Phone: [phone]      Phone: [phone]    [facility]   [address] [facility] [address]    [address] [facility] [address]  Phone: [phone]      Phone: [phone]    [facility] [place]  [address] [facility] [address] [facility] [address]     [address] [facility] [address]  Phone: [phone]      Phone: [phone]    [facility]      [facility]   [address] [facility] [address]  [address] [facility] [address]  Phone: [phone]      Phone: [phone]    [facility]  [address] [facility] [address]    [address] [place] [address]  Phone: [phone]      Phone:  [phone]    [employer]       [facility], [facility]  [address] [facility] [address]    [address] [facility] [address]  Phone: [phone]      Phone: [phone]    [facility]  [address] [facility] [address]    [address] [facility] [address]  Phone: [phone]      Phone: [phone]    [facility]   Empowered for [facility]  [address] [facility]

## 2025-01-13 NOTE — PLAN OF CARE
Problem: Anxiety  Goal: Will report anxiety at manageable levels  Description: INTERVENTIONS:  1. Administer medication as ordered  2. Teach and rehearse alternative coping skills  3. Provide emotional support with 1:1 interaction with staff  1/12/2025 2154 by Teresa Key, RN  Outcome: Progressing     Problem: Self Harm/Suicidality  Goal: Will have no self-injury during hospital stay  Description: INTERVENTIONS:  1.  Ensure constant observer at bedside with Q15M safety checks  2.  Maintain a safe environment  3.  Secure patient belongings  4.  Ensure family/visitors adhere to safety recommendations  5.  Ensure safety tray has been added to patient's diet order  6.  Every shift and PRN: Re-assess suicidal risk via Frequent Screener    1/12/2025 2154 by Teresa Key, RN  Outcome: Progressing

## 2025-01-13 NOTE — PLAN OF CARE
Problem: Anxiety  Goal: Will report anxiety at manageable levels  Description: INTERVENTIONS:  1. Administer medication as ordered  2. Teach and rehearse alternative coping skills  3. Provide emotional support with 1:1 interaction with staff  1/13/2025 1136 by Elizabeth Conte, RN  Outcome: Progressing     Problem: Self Harm/Suicidality  Goal: Will have no self-injury during hospital stay  Description: INTERVENTIONS:  1.  Ensure constant observer at bedside with Q15M safety checks  2.  Maintain a safe environment  3.  Secure patient belongings  4.  Ensure family/visitors adhere to safety recommendations  5.  Ensure safety tray has been added to patient's diet order  6.  Every shift and PRN: Re-assess suicidal risk via Frequent Screener  1/13/2025 1136 by Elizabeth Conte RN  Outcome: Progressing     Problem: Safety - Adult  Goal: Free from fall injury  Outcome: Progressing     Patient denied suicidal ideations. Safe environment maintained. Safety checks every 15 minutes continued per facility policy. Patient reports that she is ready for discharge, was anxious in AM due to the order not being in but was relieved when it was ordered by MD. Ms. Wall is aloof with peers while in day area. She remains free from falls, gait is steady.

## 2025-01-13 NOTE — DISCHARGE INSTR - DIET

## 2025-01-13 NOTE — BH NOTE
Patient given tobacco quitline number 80134525505 at this time, refusing to call at this time, states \" I just dont want to quit now\"- patient given information as to the dangers of long term tobacco use. Continue to reinforce the importance of tobacco cessation.

## 2025-01-13 NOTE — BH NOTE
[facility] [place]  Discharge Note    Pt discharged with followings belongings:   Dental Appliances: None  Vision - Corrective Lenses: None  Hearing Aid: None  Jewelry: None  Body Piercings Removed: N/A  Clothing: Footwear, Shirt, Pants, Pajamas, Socks, Undergarments  Other Valuables: Other (Comment) (none)   Valuables sent home with patient. Patient educated on aftercare instructions: Yes  Information faxed to [place] by Staff  at 4:36 PM .Patient verbalize understanding of AVS:  Yes.    Status EXAM upon discharge:  Mental Status and Behavioral Exam  Normal: No  Level of Assistance: Independent/Self  Facial Expression: Brightened  Affect: Appropriate  Level of Consciousness: Alert  Frequency of Checks: 4 times per hour, close  Mood:Normal: Yes  Mood: Anxious  Motor Activity:Normal: No  Motor Activity: Decreased  Eye Contact: Good  Observed Behavior: Cooperative, Friendly  Sexual Misconduct History: Current - no  Preception: [place] to time, [place] to person, [place] to place, [place] to situation  Attention:Normal: No  Attention: Distractible  Thought Processes: Unremarkable  Thought Content:Normal: No  Thought Content: Preoccupations  Depression Symptoms: Isolative  Anxiety Symptoms: No problems reported or observed.  Herlinda Symptoms: No problems reported or observed.  Hallucinations: None  Delusions: No  Memory:Normal: Yes  Memory: Poor recent, Poor remote  Insight and Judgment: No  Insight and Judgment: Poor insight, Poor judgment    Tobacco Screening:  Practical Counseling, on admission, marisabel X, if applicable and completed (first 3 are required if patient doesn't refuse):            ( ) Recognizing danger situations (included triggers and roadblocks)                    ( ) Coping skills (new ways to manage stress,relaxation techniques, changing routine, distraction)                                                           ( ) Basic information about quitting (benefits of quitting, techniques in how to

## 2025-01-13 NOTE — PROGRESS NOTES
Behavioral Services  Medicare Certification Upon Admission    I certify that this patient's inpatient psychiatric hospital admission is medically necessary for:    [x] (1) Treatment which could reasonably be expected to improve this patient's condition,       [x] (2) Or for diagnostic study;     AND     [x](2) The inpatient psychiatric services are provided while the individual is under the care of a physician and are included in the individualized plan of care.    Estimated length of stay/service 2-9 days    Plan for post-hospital care -outpatient care    Electronically signed by GEGE JEFFREY MD on 1/7/2025 at 9:51 AM      
    [facility] Internal Medicine  Refugio Powell MD; Sumanth Young MD, Reji Bethea MD, Estrella Multani MD, Abad Fairchild MD; Margarita Arnold MD    [facility] Internal Medicine   IN-PATIENT SERVICE   [facility]    Progress Note            Date:   1/12/2025  Patient name:  Mae Vazquez  Date of admission:  1/6/2025  9:12 PM  MRN:   [id number]  Account:  [id number]  YOB: 1965  PCP:    Shirley Boss, NORM - CNP  Room:   [address] [place] [address]  Code Status:    Prior      Chief Complaint:     Suicidal /Ac Psychosis    History Obtained From:     Patient/EMR/bedside RN     History of Present Illness:     Patient with past medical history of GERD, admitted for depression with suicidal ideation.  Initially admitted to [facility] for syncope.  Workup has been unremarkable.  Cardiology was consulted, likely POTS.  Patient to follow-up as outpatient.  Orthostats negative.    Past Medical History:     Past Medical History:   Diagnosis Date    Asthma     GERD (gastroesophageal reflux disease)     Iron deficiency anemia     Schizoaffective disorder, bipolar type (HCC)         Past Surgical History:     Past Surgical History:   Procedure Laterality Date    [place] HEMIARTHROPLASTY HIP PARTIAL      Hip Replacement, Partial, left        Medications Prior to Admission:     Prior to Admission medications    Medication Sig Start Date End Date Taking? Authorizing Provider   Multiple Vitamin (MULTIVITAMIN) tablet Take 1 tablet by mouth daily 12/21/24   Damian Oconnor MD   pantoprazole (PROTONIX) 20 MG tablet Take 1 tablet by mouth daily 12/11/24   Damian Oconnor MD   traZODone (DESYREL) 50 MG tablet Take 1 tablet by mouth nightly as needed for Sleep 3/18/24   Karthik Hutchinson MD   VORTIoxetine (TRINTELLIX) 10 MG TABS tablet Take 3 tablets by mouth daily 3/18/24   Karthik Hutchinson MD   OLANZapine (ZYPREXA) 10 MG tablet Take 1 tablet by mouth nightly    Provider 
    [facility] Internal Medicine  Refugio Powell MD; Sumanth Young MD, Reji Bethea MD, Estrella Multani MD, Abad Fairchild MD; Margarita Arnold MD    [facility] Internal Medicine   IN-PATIENT SERVICE   [facility]    Progress Note            Date:   1/8/2025  Patient name:  Mae Vazquez  Date of admission:  1/6/2025  9:12 PM  MRN:   [id number]  Account:  [id number]  YOB: 1965  PCP:    Shirley Boss, NORM - CNP  Room:   [address] [place] [address]  Code Status:    Prior      Chief Complaint:     Suicidal /Ac Psychosis    History Obtained From:     Patient/EMR/bedside RN     History of Present Illness:     Patient with past medical history of GERD, admitted for depression with suicidal ideation.  Initially admitted to [facility] for syncope.  Workup has been unremarkable.  Cardiology was consulted, likely POTS.  Patient to follow-up as outpatient.  Orthostats negative.    Past Medical History:     Past Medical History:   Diagnosis Date    Asthma     GERD (gastroesophageal reflux disease)     Iron deficiency anemia     Schizoaffective disorder, bipolar type (HCC)         Past Surgical History:     Past Surgical History:   Procedure Laterality Date    [place] HEMIARTHROPLASTY HIP PARTIAL      Hip Replacement, Partial, left        Medications Prior to Admission:     Prior to Admission medications    Medication Sig Start Date End Date Taking? Authorizing Provider   Multiple Vitamin (MULTIVITAMIN) tablet Take 1 tablet by mouth daily 12/21/24   Damian Oconnor MD   pantoprazole (PROTONIX) 20 MG tablet Take 1 tablet by mouth daily 12/11/24   Damian Oconnor MD   traZODone (DESYREL) 50 MG tablet Take 1 tablet by mouth nightly as needed for Sleep 3/18/24   Karthik Hutchinson MD   VORTIoxetine (TRINTELLIX) 10 MG TABS tablet Take 3 tablets by mouth daily 3/18/24   Karthik Hutchinson MD   OLANZapine (ZYPREXA) 10 MG tablet Take 1 tablet by mouth nightly    Provider 
    [facility] Internal Medicine  Refugio Powell MD; Sumanth Young MD, Reji Bethea MD, Estrella Multani MD, Abad Fairchild MD; Margarita Arnold MD    [facility] Internal Medicine   IN-PATIENT SERVICE   [facility]    Progress Note            Date:   1/11/2025  Patient name:  Mae Vazquez  Date of admission:  1/6/2025  9:12 PM  MRN:   [id number]  Account:  [id number]  YOB: 1965  PCP:    Shirley Boss, NORM - CNP  Room:   [address] [place] [address]  Code Status:    Prior      Chief Complaint:     Suicidal /Ac Psychosis    History Obtained From:     Patient/EMR/bedside RN     History of Present Illness:     Patient with past medical history of GERD, admitted for depression with suicidal ideation.  Initially admitted to [facility] for syncope.  Workup has been unremarkable.  Cardiology was consulted, likely POTS.  Patient to follow-up as outpatient.  Orthostats negative.    Past Medical History:     Past Medical History:   Diagnosis Date    Asthma     GERD (gastroesophageal reflux disease)     Iron deficiency anemia     Schizoaffective disorder, bipolar type (HCC)         Past Surgical History:     Past Surgical History:   Procedure Laterality Date    [place] HEMIARTHROPLASTY HIP PARTIAL      Hip Replacement, Partial, left        Medications Prior to Admission:     Prior to Admission medications    Medication Sig Start Date End Date Taking? Authorizing Provider   Multiple Vitamin (MULTIVITAMIN) tablet Take 1 tablet by mouth daily 12/21/24   Damian Oconnor MD   pantoprazole (PROTONIX) 20 MG tablet Take 1 tablet by mouth daily 12/11/24   Damian Oconnor MD   traZODone (DESYREL) 50 MG tablet Take 1 tablet by mouth nightly as needed for Sleep 3/18/24   Karthik Hutchinson MD   VORTIoxetine (TRINTELLIX) 10 MG TABS tablet Take 3 tablets by mouth daily 3/18/24   Karthik Hutchinson MD   OLANZapine (ZYPREXA) 10 MG tablet Take 1 tablet by mouth nightly    Provider 
    [facility] Internal Medicine  Refugio Powell MD; Sumanth Young MD, Reji Bethea MD, Estrella Multani MD, Abad Fairchild MD; Margarita Arnold MD    [facility] Internal Medicine   IN-PATIENT SERVICE   [facility]    Progress Note            Date:   1/9/2025  Patient name:  Mae Vazquez  Date of admission:  1/6/2025  9:12 PM  MRN:   [id number]  Account:  [id number]  YOB: 1965  PCP:    Shirley Boss, NORM - CNP  Room:   [address] [place] [address]  Code Status:    Prior      Chief Complaint:     Suicidal /Ac Psychosis    History Obtained From:     Patient/EMR/bedside RN     History of Present Illness:     Patient with past medical history of GERD, admitted for depression with suicidal ideation.  Initially admitted to [facility] for syncope.  Workup has been unremarkable.  Cardiology was consulted, likely POTS.  Patient to follow-up as outpatient.  Orthostats negative.    Past Medical History:     Past Medical History:   Diagnosis Date    Asthma     GERD (gastroesophageal reflux disease)     Iron deficiency anemia     Schizoaffective disorder, bipolar type (HCC)         Past Surgical History:     Past Surgical History:   Procedure Laterality Date    [place] HEMIARTHROPLASTY HIP PARTIAL      Hip Replacement, Partial, left        Medications Prior to Admission:     Prior to Admission medications    Medication Sig Start Date End Date Taking? Authorizing Provider   Multiple Vitamin (MULTIVITAMIN) tablet Take 1 tablet by mouth daily 12/21/24   Damian Oconnor MD   pantoprazole (PROTONIX) 20 MG tablet Take 1 tablet by mouth daily 12/11/24   Damian Oconnor MD   traZODone (DESYREL) 50 MG tablet Take 1 tablet by mouth nightly as needed for Sleep 3/18/24   Karthik Hutchinson MD   VORTIoxetine (TRINTELLIX) 10 MG TABS tablet Take 3 tablets by mouth daily 3/18/24   Karthik Hutchinson MD   OLANZapine (ZYPREXA) 10 MG tablet Take 1 tablet by mouth nightly    Provider 
  Daily Progress Note  1/12/2025    Patient Name: Mae Vazquez    CHIEF COMPLAINT: Major depressive disorder recurrent, severe with psychotic features         SUBJECTIVE:    Patient was seen for follow-up assessment today in privacy of her room.  The patient reports an improvement in her anxiety.  She has been compliant with medications.  She denies suicidal thoughts today.  She has no side effects from her medications.  She believes she would be ready to go tomorrow  Appetite:  [x] Adequate/Unchanged  [] Increased  [] Decreased      Sleep:       [] Adequate/Unchanged  [x] Fair  [] Poor      Group Attendance on Unit:   [] Yes   [x] Selectively    [] No    Compliant with scheduled medications: [x] Yes  [] No    Received emergency medications in past 24 hrs: [] Yes   [x] No    Medication Side Effects: Denies         Mental Status Exam  Level of consciousness: Alert and awake   Appearance: Appropriate attire for setting, resting in bed, with fair  grooming and hygiene   Behavior/Motor: Approachable, engages with interviewer, no psychomotor abnormalities   Attitude toward examiner: Cooperative, attentive, good eye contact  Speech: spontaneous and childlike  Mood: Euthymic  Affect: Mood congruent  Thought processes: linear and coherent   Thought content:  Denies homicidal ideation  Suicidal Ideation: Reports improvement in suicidal ideations, contracts for safety on the unit.   Delusions: No evidence of delusions.   Perceptual Disturbance: Denies perceptual disturbances.  Patient does not appear to be responding to internal stimuli.   Cognition: Oriented to self, location, time, and situation  Memory: intact  Insight: poor   Judgement: poor     Data   height is 1.575 m (5' 2\") and weight is 80.7 kg (178 lb). Her tympanic temperature is 97.2 °F (36.2 °C). Her blood pressure is 118/87 and her pulse is 88. Her respiration is 16 and oxygen saturation is 95%.   Labs:   Admission on 01/06/2025   Component Date Value Ref 
Patient reported she is feeling anxious. PRN's given. Ms. Vazquez noted she felt like she was going to throw up again while she was laying down before approaching this writer.   
Pharmacy Med Education Group Note    Date: 1/8/25  Start Time: 1430  End Time: 1445    Number Participants in Group:  5    Goal:  Patient will demonstrate an understanding of the medication’s intended purpose and possible adverse effects  Topic: [place] for Pharmacy Med Ed Group    Discipline Responsible:     OT  AT  [facility].  RT     X Other       Participation Level:     None  Minimal      X Active Listener    X Interactive    Monopolizing         Participation Quality:    X Appropriate  Inappropriate     X       Attentive        Intrusive          Sharing        Resistant          Supportive        Lethargic       Affective:     X Congruent  Incongruent  Blunted  Flat    Constricted  Anxious  Elated  Angry    Labile  Depressed  Other         Cognitive:    X Alert  Oriented PPTP     Concentration   X G  F  P   Attention Span   X G  F  P   Short-Term Memory   X G  F  P   Long-Term Memory  G  F  P   ProblemSolving/  Decision Making  G  F  P   Ability to Process  Information   X G  F  P      Contributing Factors             Delusional             Hallucinating             Flight of Ideas             Other:       Modes of Intervention:    X Education   X Support  Exploration    Clarifying  Problem Solving  Confrontation    Socialization  Limit Setting  Reality Testing    Activity  Movement  Media    Other:            Response to Learning:    X Able to verbalize current knowledge/experience    Able to verbalize/acknowledge new learning    Able to retain information    Capable of insight    Able to change behavior    Progressing to goal    Other:        Comments: patient was attentive and answered questions appropriately    Jessica Rehman, PharmD  1/8/2025, 2:48 PM    
Pharmacy Med Education Group Note    Date: 1/9/25  Start Time: 1330  End Time: 1410    Number Participants in Group:  6/11    Goal:  Patient will demonstrate an understanding of the medication’s intended purpose and possible adverse effects  Topic: [place] for Pharmacy Med Ed Group    Discipline Responsible:     OT  AT  [facility].  RT     X Other       Participation Level:     None  Minimal      X Active Listener    X Interactive    Monopolizing         Participation Quality:    X Appropriate  Inappropriate     X       Attentive        Intrusive          Sharing        Resistant          Supportive        Lethargic       Affective:     X Congruent  Incongruent  Blunted  Flat    Constricted  Anxious  Elated  Angry    Labile  Depressed  Other         Cognitive:    X Alert  Oriented PPTP     Concentration   X G  F  P   Attention Span   X G  F  P   Short-Term Memory   X G  F  P   Long-Term Memory  G  F  P   ProblemSolving/  Decision Making  G  F  P   Ability to Process  Information   X G  F  P      Contributing Factors             Delusional             Hallucinating             Flight of Ideas             Other:       Modes of Intervention:    X Education   X Support  Exploration    Clarifying  Problem Solving  Confrontation    Socialization  Limit Setting  Reality Testing    Activity  Movement  Media    Other:            Response to Learning:    X Able to verbalize current knowledge/experience    Able to verbalize/acknowledge new learning    Able to retain information    Capable of insight    Able to change behavior    Progressing to goal    Other:        Comments:       Regina Crowder PharmD, BCPS, BCPP  1/9/2025 2:13 PM  [phone]    
Pharmacy Medication History Note      List of current medications patient is taking is complete.    Source of information: [facility] ([phone]), [facility] ([phone]), [facility], PDMP, [employer] dispense report    Changes made to medication list:  Medications removed (include reason, ex. therapy complete or physician discontinued, noncompliance):  Omeprazole (alternate therapy)    Medications flagged for provider review:  Estefania Ellipta - patient not actively filling    Medications added/doses adjusted:  Added Pantoprazole 20 mg daily  Added Multivitamin daily  Adjusted Buspirone to 15 mg three times daily    Other notes (ex. Recent course of antibiotics, Coumadin dosing):  Per pharmacist at [facility], the patient was admitted to a psychiatric facility in [place] in December 2024 for a short time.  The patient blister packs with [facility] ([phone]).      Current Home Medication List at Time of Admission:  Prior to Admission medications    Medication Sig   Multiple Vitamin (MULTIVITAMIN) tablet Take 1 tablet by mouth daily   pantoprazole (PROTONIX) 20 MG tablet Take 1 tablet by mouth daily   traZODone (DESYREL) 50 MG tablet Take 1 tablet by mouth nightly as needed for Sleep   VORTIoxetine (TRINTELLIX) 10 MG TABS tablet Take 3 tablets by mouth daily   OLANZapine (ZYPREXA) 10 MG tablet Take 1 tablet by mouth nightly   busPIRone (BUSPAR) 15 MG tablet Take 15 mg by mouth 3 times daily   docusate sodium (COLACE) 100 MG capsule Take 1 capsule by mouth daily as needed for Constipation   ferrous sulfate (IRON 325) 325 (65 Fe) MG tablet Take 1 tablet by mouth daily (with breakfast)   Cholecalciferol (VITAMIN D3) 1.25 MG (12951 UT) CAPS Take 1 capsule by mouth once a week Sundays         Please let me know if you have any questions about this encounter. Thank you!    Electronically signed by Regina Crowder RPH on 1/7/2025 at 10:28 AM     
RT ASSESSMENT TREATMENT GOALS    [x]Pt Goal:  Pt will identify 1-2 positive coping skills by time of discharge.    [x]Pt Goal:  Pt will identify 1-2 positive aspects of self by time of discharge.    []Pt Goal:  Pt will remain on task/topic for 15-30 minutes during group by time of discharge.    []Pt Goal:  Pt will identify 1-2 aspects of relapse prevention plan by time of discharge.    []Pt Goal:  Pt will join in conversation with peers 1-2 times per group by time of discharge.    []Pt Goal:  Pt will identify 1-2 new leisure interests by time of discharge.    []Pt Goal:  Pt will not voice any delusional content by time of discharge.   
(MICOTIN) 2 % powder Apply topically 2 times daily. 1/13/25  Yes Daryl Lopez MD   traZODone (DESYREL) 50 MG tablet Take 1 tablet by mouth nightly as needed for Sleep 1/13/25  Yes Daryl Lopez MD   OLANZapine (ZYPREXA) 10 MG tablet Take 1 tablet by mouth nightly 1/13/25  Yes Daryl Lopez MD   pantoprazole (PROTONIX) 20 MG tablet Take 1 tablet by mouth daily 12/11/24   ProviderDamian MD   VORTIoxetine (TRINTELLIX) 10 MG TABS tablet Take 3 tablets by mouth daily 3/18/24   Karthik Hutchinson MD        Allergies:     Penicillins, Animal chews, Dust mite extract, Fish-derived products, and Molds & smuts    Social History:     Tobacco:    reports that she has never smoked. She has never been exposed to tobacco smoke. She has never used smokeless tobacco.  Alcohol:      reports that she does not currently use alcohol.  Drug Use:  reports no history of drug use.    Family History:     Family History   Problem Relation Age of Onset    Heart Disease Mother     Cancer Father        Review of Systems:     Positive and Negative as described in HPI.    CONSTITUTIONAL:  negative for fevers, chills, sweats, fatigue, weight loss  HEENT:  negative for vision, hearing changes, runny nose, throat pain  RESPIRATORY:  negative for shortness of breath, cough, congestion, wheezing.  CARDIOVASCULAR:  negative for chest pain, palpitations.  GASTROINTESTINAL:  negative for nausea, vomiting, diarrhea, constipation, change in bowel habits, abdominal pain   GENITOURINARY:  negative for difficulty of urination, burning with urination, frequency   INTEGUMENT:  negative for rash, skin lesions, easy bruising   HEMATOLOGIC/LYMPHATIC:  negative for swelling/edema   ALLERGIC/IMMUNOLOGIC:  negative for urticaria , itching  ENDOCRINE:  negative increase in drinking, increase in urination, hot or cold intolerance  MUSCULOSKELETAL:  negative joint pains, muscle aches, swelling of joints  NEUROLOGICAL:  negative for headaches, dizziness, 
Facility-Administered Medications: ferrous sulfate (IRON 325) tablet 325 mg, 325 mg, Oral, Daily with breakfast  pantoprazole (PROTONIX) tablet 20 mg, 20 mg, Oral, QAM AC  miconazole (MICOTIN) 2 % powder, , Topical, BID  guaiFENesin (MUCINEX) extended release tablet 600 mg, 600 mg, Oral, BID  busPIRone (BUSPAR) tablet 15 mg, 15 mg, Oral, TID  OLANZapine (ZYPREXA) tablet 10 mg, 10 mg, Oral, Nightly  therapeutic multivitamin-minerals 1 tablet, 1 tablet, Oral, Daily  VORTIoxetine (TRINTELLIX) tablet 30 mg, 30 mg, Oral, Daily  acetaminophen (TYLENOL) tablet 650 mg, 650 mg, Oral, Q4H PRN  polyethylene glycol (GLYCOLAX) packet 17 g, 17 g, Oral, Daily PRN  hydrOXYzine HCl (ATARAX) tablet 50 mg, 50 mg, Oral, TID PRN  traZODone (DESYREL) tablet 50 mg, 50 mg, Oral, Nightly PRN  aluminum & magnesium hydroxide-simethicone (MAALOX) 200-200-20 MG/5ML suspension 30 mL, 30 mL, Oral, Q6H PRN  haloperidol lactate (HALDOL) injection 5 mg, 5 mg, IntraMUSCular, Q6H PRN **AND** diphenhydrAMINE (BENADRYL) injection 50 mg, 50 mg, IntraMUSCular, Q6H PRN  haloperidol (HALDOL) tablet 5 mg, 5 mg, Oral, Q6H PRN    ASSESSMENT  Severe recurrent major depression with psychotic features (HCC)         PATIENT HANDOFF  Patient symptoms are: Unstable but showing minor improvement  Monitor need and frequency of PRN medications.  Encourage participation in groups and milieu.  Medication changes and discharge planning per attending  Follow-up daily while inpatient.     Patient continues to be monitored in the inpatient psychiatric facility at [facility] for safety and stabilization. Patient continues to need, on a daily basis, active treatment furnished directly by or requiring the supervision of inpatient psychiatric personnel.    Electronically signed by NORM Mckeon CNP on 1/8/2025 at 2:13 PM    **This report has been created using voice recognition software. It may contain minor errors which are inherent in voice recognition technology.** 
Nightly PRN  aluminum & magnesium hydroxide-simethicone (MAALOX) 200-200-20 MG/5ML suspension 30 mL, 30 mL, Oral, Q6H PRN  haloperidol lactate (HALDOL) injection 5 mg, 5 mg, IntraMUSCular, Q6H PRN **AND** diphenhydrAMINE (BENADRYL) injection 50 mg, 50 mg, IntraMUSCular, Q6H PRN  haloperidol (HALDOL) tablet 5 mg, 5 mg, Oral, Q6H PRN    ASSESSMENT  Severe recurrent major depression with psychotic features (HCC)         PATIENT HANDOFF  Patient symptoms are: modestly improving   Attempt to develop insight  Monitor need and frequency of PRN medications.  Encourage participation in groups and milieu.  Medication changes and discharge planning per attending  Follow-up daily while inpatient.     Patient continues to be monitored in the inpatient psychiatric facility at [facility] for safety and stabilization. Patient continues to need, on a daily basis, active treatment furnished directly by or requiring the supervision of inpatient psychiatric personnel.    Electronically signed by NORM Mendoza CNP on 1/11/2025 at 4:29 PM    **This report has been created using voice recognition software. It may contain minor errors which are inherent in voice recognition technology.**    I independently saw and evaluated the patient.  I reviewed the  documentation above    .  Any additional comments or changes to the   documentation are stated below otherwise agree with assessment.      QTc Calculation (Bazett)   Date Value Ref Range Status   01/05/2025 462 ms Final       Vitals:    01/10/25 1922 01/11/25 0756 01/11/25 0800 01/11/25 0930   BP: 115/67 (!) 141/93  136/86   Pulse: 85 (!) 132 (!) 113 94   Resp: 14 16     Temp: 97 °F (36.1 °C) 97.9 °F (36.6 °C)     TempSrc:  Temporal     SpO2: 99% 92% 95%    Weight:       Height:             Current Facility-Administered Medications   Medication Dose Route Frequency Provider Last Rate Last Admin    ondansetron (ZOFRAN-ODT) disintegrating tablet 4 mg  4 mg Oral Q8H PRN Harriet 
Renee Reed, APRN - CNP            The patient was seen face-to-face.  She has a childlike affect.  Her mood appears to be improving.  The patient has some poverty of thought and speech.  She also has increased latency in responses which is likely related to her chronic psychotic illness.  No changes made to medication today.     PLAN  Medications as noted above  Attempt to develop insight  Psycho-education conducted.  Supportive Therapy conducted.  Follow-up daily while on inpatient unit    Electronically signed by GEGE JEFFREY MD on 1/9/25 at 9:57 PM EST      
mL at 01/08/25 1343    haloperidol lactate (HALDOL) injection 5 mg  5 mg IntraMUSCular Q6H PRN Renee Looney APRN - CNP        And    diphenhydrAMINE (BENADRYL) injection 50 mg  50 mg IntraMUSCular Q6H PRN Renee Looney APRN - MARGARET        haloperidol (HALDOL) tablet 5 mg  5 mg Oral Q6H PRN Renee Looney APRN - CNP            The patient was seen face-to-face.  He reports doing little better but feels she is not ready for discharge.  She is feeling a little overwhelmed.  She is tolerating her medication without any side effects.  No changes have been made to her medication today and her expected length of stay is 1 day     PLAN  Medications as noted above  Attempt to develop insight  Psycho-education conducted.  Supportive Therapy conducted.  Follow-up daily while on inpatient unit    Electronically signed by GEGE JEFFREY MD on 1/10/25 at 9:19 PM EST

## 2025-01-13 NOTE — DISCHARGE SUMMARY
Worried About Running Out of Food in the Last Year: Patient declined     Ran Out of Food in the Last Year: Patient declined   Recent Concern: Food Insecurity - Food Insecurity Present (1/4/2025)    Hunger Vital Sign     Worried About Running Out of Food in the Last Year: Sometimes true     Ran Out of Food in the Last Year: Sometimes true   Transportation Needs: Patient Declined (1/6/2025)    PRAPARE - Transportation     Lack of Transportation (Medical): Patient declined     Lack of Transportation (Non-Medical): Patient declined   Recent Concern: Transportation Needs - Unmet Transportation Needs (1/4/2025)    PRAPARE - Transportation     Lack of Transportation (Medical): Yes     Lack of Transportation (Non-Medical): Patient unable to answer   Physical Activity: Not on file   Stress: Not on file   Social Connections: Not on file   Intimate Partner Violence: Unknown (2/23/2024)    [facility]    [place] Safety & Environment     Fear of Current or Ex-Partner: Not on file     Emotionally Abused: Not on file     Physically Abused: Not on file     Sexually Abused: Not on file     Physically or Sexually Abused: Not on file   Housing Stability: Patient Declined (1/6/2025)    Housing Stability Vital Sign     Unable to Pay for Housing in the Last Year: Patient declined     Number of Times Moved in the Last Year: 0     Homeless in the Last Year: Patient declined   Recent Concern: Housing Stability - High Risk (1/4/2025)    Housing Stability Vital Sign     Unable to Pay for Housing in the Last Year: Yes     Number of Times Moved in the Last Year: 2     Homeless in the Last Year: Yes       MEDICATIONS:    Current Facility-Administered Medications:     ondansetron (ZOFRAN-ODT) disintegrating tablet 4 mg, 4 mg, Oral, Q8H PRN, Anisha Larios APRN - CNP    midodrine (PROAMATINE) tablet 5 mg, 5 mg, Oral, TID PRN, Festus Lynn MD    pantoprazole (PROTONIX) tablet 40

## 2025-01-13 NOTE — GROUP NOTE
Group Therapy Note    Date: 1/13/2025    Group Start Time: 1004  Group End Time: 1020  Group Topic: Nursing    Elizabeth Michaels, RN    Nursing  Group Note        Date: January 13, 2025 Start Time:  1004   End Time:  1020      Number of Participants in Group & Unit Census:  3/10    Topic: Constructive Thinking    Goal of Group:Use Constructive thinking skills to plan on how we can achieve goals      Comments:     Patient did not participate in  Nursing  group, despite staff encouragement and explanation of benefits.  Patient remain seclusive to self.  Q15 minute safety checks maintained for patient safety and will continue to encourage patient to attend unit programming.

## 2025-01-13 NOTE — GROUP NOTE
Nursing Group Note        Date: January 13, 2025 Start Time: 0940  End Time: 1003      Number of Participants in Group & Unit Census:  3/10    Topic: Goals Group    Goal of Group:Identify a Goal       Comments:     Patient did not participate in Community Meeting group, despite staff encouragement and explanation of benefits.  Patient remain seclusive to self.  Q15 minute safety checks maintained for patient safety and will continue to encourage patient to attend unit programming.

## 2025-01-14 NOTE — CARE COORDINATION
Discharge Arrangements:      Guardian notified: n/a    Discharge destination/address: Return home with father     Transported by: cab      Patient was not accepting of referral.  Follow up appointment is scheduled for [facility] 1/15 at 11:30AM    *[facility] resources were offered to patient throughout admission and at time of discharge. This list of [facility] providers was provided to patient:     [facility]  [address] [facility] [address]   [address] [facility] [address]  Phone: [phone]    Phone: [phone]    [facility]   [address] [facility] [address]  [address] [facility] [address]  Phone: [phone]    Phone: [phone]    [facility]  [address] [facility] [address]    [address] [facility] [address]  Phone: [phone]    Phone: [phone]    [facility]     [facility]   [address] [facility] [address]  [place] [address]      Phone: [phone]  Phone: [phone]        [facility]  [address] [facility] [facility] [address]  [address] [place] [address]  Phone: [phone]    Phone:  [phone]    [employer]     [facility]  [address] [facility] [address]  [address] [facility] [address]  Phone: [phone]    Phone: [phone]    [facility]  [address] [facility] [address]  [address] [facility] [address]  Phone: [phone]    Phone: [phone]    [facility]  [facility]  [address] [facility] [address] [address] [facility] [address]  Phone: [phone]    Phone: [phone]    [facility] [address] [place] [address] [address] [facility] [address]  Phone: 
BHI Biopsychosocial Assessment    Current Level of Psychosocial Functioning     Independent   Dependent  X  Minimal Assist       Psychosocial High Risk Factors (check all that apply)    Unable to obtain meds   Chronic illness/pain    Substance abuse   Lack of Family Support   Financial stress   Isolation X  Inadequate Community Resources  Suicide attempt(s)  Not taking medications X  Victim of crime   Developmental DelayX  Unable to manage personal needs  X  Age 65 or older   Homeless  No transportation   Readmission within 30 days  Unemployment  Traumatic Event    Psychiatric Advanced Directives: none reported     Family to Involve in Treatment: Father is supportive and involved in her care     Sexual Orientation:  LARA    Patient Strengths: adequate housing, family support, insurance, linked with outpatient treatment at [facility]     Patient Barriers: non-compliant with medications and treatment, presents on admission with suicidal ideation     Opiate Education Provided: N/A Patient denies and does not have any documented history of Opiate or Heroin use/abuse. Patient denies any Alcohol or Illicit drug use.     CMHC/mental health history: Linked with [facility], has been non-compliant with medications, was sent to [facility] on 1/3- transferred to [facility] on 1/4 d/t fall at [facility], Lives with father and niece in [place], [place].     Plan of Care   medication management, group/individual therapies, family meetings, psycho -education, treatment team meetings to assist with stabilization    Initial Discharge Plan:  Patient reports a plan to return to [place] to live with her father and her niece at discharge. She reports a plan to follow up with outpatient treatment at  [facility].     Clinical Summary:  The patient is a 59 y.o. female who was originally admitted to the medical department on 1/4 for  syncopal event. Patient presents with a history of Schizoaffective Disorder. Patient reports that she was going to the 
[facility] [address] [place] - [address] - P [phone] - F [phone]  [address] [facility] [address]      Phone: [phone]   busPIRone 15 MG tablet  ferrous sulfate 325 (65 Fe) MG tablet  miconazole 2 % powder  midodrine 5 MG tablet  OLANZapine 10 MG tablet  therapeutic multivitamin-minerals tablet  traZODone 50 MG tablet     Pharmacy Instructions:    Filled at [facility]            Follow Up Appointment: [facility]  [address]  [address] [place] [address]    Go on 1/15/2025  At 11:30AM- Mental health medication management appointment

## 2025-01-22 ENCOUNTER — HOSPITAL ENCOUNTER (EMERGENCY)
Age: 60
Discharge: HOME | End: 2025-01-22
Attending: EMERGENCY MEDICINE
Payer: COMMERCIAL

## 2025-01-22 ENCOUNTER — APPOINTMENT (OUTPATIENT)
Dept: GENERAL RADIOLOGY | Age: 60
End: 2025-01-22
Payer: COMMERCIAL

## 2025-01-22 VITALS
BODY MASS INDEX: 33.31 KG/M2 | SYSTOLIC BLOOD PRESSURE: 123 MMHG | WEIGHT: 188 LBS | OXYGEN SATURATION: 100 % | HEIGHT: 63 IN | HEART RATE: 87 BPM | TEMPERATURE: 98.5 F | RESPIRATION RATE: 16 BRPM | DIASTOLIC BLOOD PRESSURE: 77 MMHG

## 2025-01-22 DIAGNOSIS — R45.851 SUICIDAL IDEATION: ICD-10-CM

## 2025-01-22 DIAGNOSIS — R42 DIZZINESS: Primary | ICD-10-CM

## 2025-01-22 LAB
ALBUMIN SERPL-MCNC: 3.8 G/DL (ref 3.5–5.2)
ALBUMIN/GLOB SERPL: 1.1 {RATIO} (ref 1–2.5)
ALP SERPL-CCNC: 105 U/L (ref 35–104)
ALT SERPL-CCNC: 7 U/L (ref 10–35)
AMPHET UR QL SCN: NEGATIVE
ANION GAP SERPL CALCULATED.3IONS-SCNC: 13 MMOL/L (ref 9–16)
APAP SERPL-MCNC: <5 UG/ML (ref 10–30)
AST SERPL-CCNC: 24 U/L (ref 10–35)
BARBITURATES UR QL SCN: NEGATIVE
BASOPHILS # BLD: 0.05 K/UL (ref 0–0.2)
BASOPHILS NFR BLD: 1 % (ref 0–2)
BENZODIAZ UR QL: NEGATIVE
BILIRUB DIRECT SERPL-MCNC: 0.1 MG/DL (ref 0–0.2)
BILIRUB INDIRECT SERPL-MCNC: 0.2 MG/DL (ref 0–1)
BILIRUB SERPL-MCNC: 0.3 MG/DL (ref 0–1.2)
BUN SERPL-MCNC: 13 MG/DL (ref 6–20)
CALCIUM SERPL-MCNC: 9.2 MG/DL (ref 8.6–10.4)
CANNABINOIDS UR QL SCN: NEGATIVE
CHLORIDE SERPL-SCNC: 102 MMOL/L (ref 98–107)
CO2 SERPL-SCNC: 22 MMOL/L (ref 20–31)
COCAINE UR QL SCN: NEGATIVE
CREAT SERPL-MCNC: 0.9 MG/DL (ref 0.6–0.9)
EOSINOPHIL # BLD: 0.13 K/UL (ref 0–0.44)
EOSINOPHILS RELATIVE PERCENT: 1 % (ref 1–4)
ERYTHROCYTE [DISTWIDTH] IN BLOOD BY AUTOMATED COUNT: 18.3 % (ref 11.8–14.4)
ETHANOL PERCENT: <0.01 %
ETHANOLAMINE SERPL-MCNC: <10 MG/DL (ref 0–0.08)
FENTANYL UR QL: NEGATIVE
GFR, ESTIMATED: 74 ML/MIN/1.73M2
GLOBULIN SER CALC-MCNC: 3.4 G/DL
GLUCOSE SERPL-MCNC: 183 MG/DL (ref 74–99)
HCG SERPL QL: NEGATIVE
HCT VFR BLD AUTO: 36.2 % (ref 36.3–47.1)
HGB BLD-MCNC: 10.7 G/DL (ref 11.9–15.1)
IMM GRANULOCYTES # BLD AUTO: 0.04 K/UL (ref 0–0.3)
IMM GRANULOCYTES NFR BLD: 0 %
LYMPHOCYTES NFR BLD: 1.32 K/UL (ref 1.1–3.7)
LYMPHOCYTES RELATIVE PERCENT: 14 % (ref 24–43)
MCH RBC QN AUTO: 21.7 PG (ref 25.2–33.5)
MCHC RBC AUTO-ENTMCNC: 29.6 G/DL (ref 28.4–34.8)
MCV RBC AUTO: 73.3 FL (ref 82.6–102.9)
METHADONE UR QL: POSITIVE
MONOCYTES NFR BLD: 0.88 K/UL (ref 0.1–1.2)
MONOCYTES NFR BLD: 9 % (ref 3–12)
NEUTROPHILS NFR BLD: 75 % (ref 36–65)
NEUTS SEG NFR BLD: 7.32 K/UL (ref 1.5–8.1)
NRBC BLD-RTO: 0 PER 100 WBC
OPIATES UR QL SCN: NEGATIVE
OXYCODONE UR QL SCN: NEGATIVE
PCP UR QL SCN: NEGATIVE
PLATELET # BLD AUTO: 564 K/UL (ref 138–453)
PMV BLD AUTO: 9.5 FL (ref 8.1–13.5)
POTASSIUM SERPL-SCNC: 3.8 MMOL/L (ref 3.7–5.3)
PROT SERPL-MCNC: 7.2 G/DL (ref 6.6–8.7)
RBC # BLD AUTO: 4.94 M/UL (ref 3.95–5.11)
RBC # BLD: ABNORMAL 10*6/UL
SALICYLATES SERPL-MCNC: 0.5 MG/DL (ref 0–10)
SODIUM SERPL-SCNC: 137 MMOL/L (ref 136–145)
TEST INFORMATION: ABNORMAL
TROPONIN I SERPL HS-MCNC: 9 NG/L (ref 0–14)
WBC OTHER # BLD: 9.7 K/UL (ref 3.5–11.3)

## 2025-01-22 PROCEDURE — 85025 COMPLETE CBC W/AUTO DIFF WBC: CPT

## 2025-01-22 PROCEDURE — 6370000000 HC RX 637 (ALT 250 FOR IP)

## 2025-01-22 PROCEDURE — 80307 DRUG TEST PRSMV CHEM ANLYZR: CPT

## 2025-01-22 PROCEDURE — 99285 EMERGENCY DEPT VISIT HI MDM: CPT

## 2025-01-22 PROCEDURE — G0480 DRUG TEST DEF 1-7 CLASSES: HCPCS

## 2025-01-22 PROCEDURE — 84703 CHORIONIC GONADOTROPIN ASSAY: CPT

## 2025-01-22 PROCEDURE — 80076 HEPATIC FUNCTION PANEL: CPT

## 2025-01-22 PROCEDURE — 80143 DRUG ASSAY ACETAMINOPHEN: CPT

## 2025-01-22 PROCEDURE — 80179 DRUG ASSAY SALICYLATE: CPT

## 2025-01-22 PROCEDURE — 84484 ASSAY OF TROPONIN QUANT: CPT

## 2025-01-22 PROCEDURE — 93005 ELECTROCARDIOGRAM TRACING: CPT

## 2025-01-22 PROCEDURE — 80048 BASIC METABOLIC PNL TOTAL CA: CPT

## 2025-01-22 PROCEDURE — 71045 X-RAY EXAM CHEST 1 VIEW: CPT

## 2025-01-22 RX ORDER — MECLIZINE HCL 12.5 MG 12.5 MG/1
12.5 TABLET ORAL ONCE
Status: COMPLETED | OUTPATIENT
Start: 2025-01-22 | End: 2025-01-22

## 2025-01-22 RX ADMIN — MECLIZINE 12.5 MG: 12.5 TABLET ORAL at 20:17

## 2025-01-23 LAB
EKG ATRIAL RATE: 91 BPM
EKG P AXIS: 77 DEGREES
EKG P-R INTERVAL: 132 MS
EKG Q-T INTERVAL: 348 MS
EKG QRS DURATION: 78 MS
EKG QTC CALCULATION (BAZETT): 428 MS
EKG R AXIS: -22 DEGREES
EKG T AXIS: 33 DEGREES
EKG VENTRICULAR RATE: 91 BPM

## 2025-01-23 PROCEDURE — 93010 ELECTROCARDIOGRAM REPORT: CPT | Performed by: INTERNAL MEDICINE

## 2025-01-23 NOTE — DISCHARGE INSTRUCTIONS
Medically stable for [facility]    You were seen evaluated emergency room for similar concern   emergency department for dizziness as well as suicidal thoughts within the week.  Your cardiac workup was stable.  Please follow-up with your primary care provider.  Please return to the ED with any new or worsening symptoms or concern including chest pain, shortness of breath, lightheadedness, worsening dizziness, abdominal pain, nausea, vomiting, or any other concerns.

## 2025-01-23 NOTE — ED PROVIDER NOTES
[facility]     Emergency Department     Faculty Attestation    I performed a history and physical examination of the patient and discussed management with the resident. I reviewed the resident’s note and agree with the documented findings and plan of care. Any areas of disagreement are noted on the chart. I was personally present for the key portions of any procedures. I have documented in the chart those procedures where I was not present during the key portions. I have reviewed the emergency nurses triage note. I agree with the chief complaint, past medical history, past surgical history, allergies, medications, social and family history as documented unless otherwise noted below. Documentation of the HPI, Physical Exam and Medical Decision Making performed by medical students or scribes is based on my personal performance of the HPI, PE and MDM. For Physician Assistant/ Nurse Practitioner cases/documentation I have personally evaluated this patient and have completed at least one if not all key elements of the E/M (history, physical exam, and MDM). Additional findings are as noted.    Vital signs:   Vitals:    01/22/25 2010   BP:    Pulse:    Resp:    Temp: 98.5 °F (36.9 °C)   SpO2:       59-year-old female presents with dizziness that she describes as lightheadedness, which she notices when she gets very stressed.  Patient is very stressed appearing and tearful.  Recent psych admission at [facility].  Patient states that she is suicidal, and often thinks of taking all of her pills because it would be better if she did not live.  Plan for cardiac workup.  We will discuss with social work            Emily Marino M.D,  Attending Emergency  Physician            Emily Marino MD  01/22/25 [id number]

## 2025-01-23 NOTE — ED NOTES
[] [facility]    [] [facility]    [x]  [facility]    SUICIDE RISK ASSESSMENT      Y  N     [x] [] In the past two weeks have you had thoughts of hurting yourself in any way?    [x] [] In the past two weeks have you had thoughts that you would be better off dead?   [] [x] Have you made a suicide attempt in the past two months?   [x] [] Do you have a plan for hurting yourself or suicide?   [] [x] Presence of hallucinations/voices related to hurting himself or herself or someone else.    SUICIDE/SECURITY WATCH PRECAUTION CHECKLIST     Orders    [x]  Suicide/Security Watch Precautions initiated as checked below:   1/22/25 9:27 PM EST 02/02    [x] Notified physician:  Emily Marino MD  1/22/25 9:27 PM EST    [x] Orders obtained as appropriate:     [x] 1:1 Observer     [] Psych Consult     [] Psych Consult    Name:  Date:  Time:    [x] 1:1 Observer, Notified by:  Tori Willoughby RN    Contact Nurse Supervisor    [x] Remove all personal clothes from room and place in snap/paper gown/pants.  Slipper only    [x] Remove all personal belongings from room and secured away from patient.    Documentation    [x] Initiate Suicide/Security Watch Precaution Flow Sheet    [x] Initiate individualized Care Plan/Problem    [x] Document why precautions initiated on flow sheet (Initiate Nursing Care Plan/Problem)    [x] 1:1 Observer in place; instructions provided.  Suicide precautions require observer be within arms length.    [x] Nurse-Observer Communication Hand-off initiated by RN, reviewed with Observer.  Subsequently used as Hand Off between Observers.    [x] Initiate every 15 minute observations per observer as delegated by the RN.    [x] Initiate RN assessment and documentation    Environmental Scan  Search Criteria and Process: OPTIONAL, see Search Policy    [] Reason for search:    [] Nursing in presence of second person to search patient    [] Patient notified of reason for body assessment and belongings

## 2025-01-23 NOTE — ED NOTES
Social work met with patient who reported to be at the ER due to not feeling well. Patient reported to be depressed and then began crying. Patient reported 5 days ago she stopped coming out of her room and stopped eating due to having suicidal thoughts around taking all of her mental health medication. Patient reported she would not follow through with that plan due to her concern with the aftermath of the consequences and the affects it would have on her. Patient reported she does not really want to die. Patient reported she is not currently suicidal, homicidal or having any auditory or visual hallucinations.     Patient reported to have food at home and good supports as she lives with her dad, niece and niece's wife. Patient reported there was food in the home and that she could've ate if she walked to the kitchen. Patient reported she didn't want to talk, she wanted her family to bring her her food for her.     Patient reported she recently was at the [facility] and upon discharge was told to follow up with Beka Mcadams once a month. Patient reported she kept this appointment, but doesn't recall the recommendation. Patient reported she stopped taking her medications 5 days ago because she thought what is the point. Patient reported to feel that her medications \"do something positive.\" Patient didn't recall all of the medications she takes. Patient reported a diagnosis of anxiety and depression. Patient denied any substance use.

## 2025-01-23 NOTE — ED TRIAGE NOTES
Pt presents to ED via triage with initial complaint of dizziness then complains of suicidal ideations.  Pt states she felt lightheaded starting this afternoon around 5 pm.   Pt states she feels like she is about to pass out but did not fell or passed out.   Pt denies any chest pain, SOB and any other symptoms.  Pt also stated she is suicidal to Dr. Marino, when asked again she answered writer that she is having suicidal thoughts and plans to take all her psych pills.   Pt denies any visual and auditory hallucinations.  Pt is tearful and states she feels safe at home but is not getting along well with Niece.   Pt changed out to paper scrubs, security called for belongings.  Charge RN informed.

## 2025-01-23 NOTE — ED NOTES
Social work called [facility] who reported patient can be cabbed to [facility].

## 2025-01-23 NOTE — ED PROVIDER NOTES
[facility] EMERGENCY DEPARTMENT  Emergency Department Encounter  Emergency Medicine Resident     Pt Name: Mae Vazquez  MRN: [id number]  Birthdate 1965  Date of evaluation: 1/22/25    CHIEF COMPLAINT:   Chief Complaint   Patient presents with    Dizziness     X1 hr ago    Suicidal       HISTORY OF PRESENT ILLNESS     Mae Vazquez is a 59 y.o. female who presents with dizziness for 1 hour.  Denies any chest pain or shortness of breath.  Describes feeling off balance.  Denying abdominal pain, nausea, vomiting.  Denies any numbness, tingling, weakness.  Denies any falls or trauma.    REVIEW OF SYSTEMS       See HPI    PAST MEDICAL/SURGICAL/FAMILY HISTORY     Past Medical Hx:      has a past medical history of Asthma, GERD (gastroesophageal reflux disease), Iron deficiency anemia, and Schizoaffective disorder, bipolar type (HCC).    Past Surgical Hx:     has a past surgical history that includes pr hemiarthroplasty hip partial.    Social Hx:  Social History     Socioeconomic History    Marital status: Single     Spouse name: Not on file    Number of children: Not on file    Years of education: Not on file    Highest education level: Not on file   Occupational History    Not on file   Tobacco Use    Smoking status: Never     Passive exposure: Never    Smokeless tobacco: Never   Vaping Use    Vaping status: Never Used   Substance and Sexual Activity    Alcohol use: Not Currently    Drug use: Never    Sexual activity: Not Currently   Other Topics Concern    Not on file   Social History Narrative    Not on file     Social Determinants of Health     Financial Resource Strain: Not on file   Food Insecurity: Patient Declined (1/6/2025)    Hunger Vital Sign     Worried About Running Out of Food in the Last Year: Patient declined     Ran Out of Food in the Last Year: Patient declined   Recent Concern: Food Insecurity - Food Insecurity Present (1/4/2025)    Hunger Vital Sign     Worried About Running Out of